# Patient Record
Sex: FEMALE | Race: WHITE | NOT HISPANIC OR LATINO | Employment: OTHER | URBAN - NONMETROPOLITAN AREA
[De-identification: names, ages, dates, MRNs, and addresses within clinical notes are randomized per-mention and may not be internally consistent; named-entity substitution may affect disease eponyms.]

---

## 2017-09-13 ENCOUNTER — TRANSCRIBE ORDERS (OUTPATIENT)
Dept: ADMINISTRATIVE | Facility: HOSPITAL | Age: 77
End: 2017-09-13

## 2017-09-13 DIAGNOSIS — Z12.31 VISIT FOR SCREENING MAMMOGRAM: Primary | ICD-10-CM

## 2017-09-29 ENCOUNTER — HOSPITAL ENCOUNTER (OUTPATIENT)
Dept: MAMMOGRAPHY | Facility: HOSPITAL | Age: 77
Discharge: HOME | End: 2017-09-29
Attending: INTERNAL MEDICINE | Admitting: INTERNAL MEDICINE

## 2017-09-29 DIAGNOSIS — Z12.31 VISIT FOR SCREENING MAMMOGRAM: ICD-10-CM

## 2017-09-29 PROCEDURE — G0202 SCR MAMMO BI INCL CAD: HCPCS | Performed by: RADIOLOGY

## 2017-09-29 PROCEDURE — 77063 BREAST TOMOSYNTHESIS BI: CPT

## 2017-09-29 PROCEDURE — G0202 SCR MAMMO BI INCL CAD: HCPCS

## 2017-09-29 PROCEDURE — 77063 BREAST TOMOSYNTHESIS BI: CPT | Performed by: RADIOLOGY

## 2018-04-10 ENCOUNTER — HOSPITAL ENCOUNTER (OUTPATIENT)
Facility: HOSPITAL | Age: 78
Setting detail: OBSERVATION
Discharge: HOME | End: 2018-04-12
Attending: EMERGENCY MEDICINE | Admitting: INTERNAL MEDICINE

## 2018-04-10 ENCOUNTER — APPOINTMENT (OUTPATIENT)
Dept: GENERAL RADIOLOGY | Facility: HOSPITAL | Age: 78
End: 2018-04-10

## 2018-04-10 DIAGNOSIS — R07.9 CHEST PAIN, UNSPECIFIED TYPE: Primary | ICD-10-CM

## 2018-04-10 LAB
ALBUMIN SERPL-MCNC: 4.1 G/DL (ref 3.4–4.8)
ALBUMIN/GLOB SERPL: 1.5 G/DL (ref 1.5–2.5)
ALP SERPL-CCNC: 115 U/L (ref 35–104)
ALT SERPL W P-5'-P-CCNC: 18 U/L (ref 10–36)
ANION GAP SERPL CALCULATED.3IONS-SCNC: 5.3 MMOL/L (ref 3.6–11.2)
AST SERPL-CCNC: 19 U/L (ref 10–30)
BASOPHILS # BLD AUTO: 0.01 10*3/MM3 (ref 0–0.3)
BASOPHILS NFR BLD AUTO: 0.2 % (ref 0–2)
BILIRUB SERPL-MCNC: 0.4 MG/DL (ref 0.2–1.8)
BNP SERPL-MCNC: 298 PG/ML (ref 0–100)
BUN BLD-MCNC: 24 MG/DL (ref 7–21)
BUN/CREAT SERPL: 17.1 (ref 7–25)
CALCIUM SPEC-SCNC: 10 MG/DL (ref 7.7–10)
CHLORIDE SERPL-SCNC: 104 MMOL/L (ref 99–112)
CHOLEST SERPL-MCNC: 123 MG/DL (ref 0–200)
CO2 SERPL-SCNC: 23.7 MMOL/L (ref 24.3–31.9)
CREAT BLD-MCNC: 1.4 MG/DL (ref 0.43–1.29)
DEPRECATED RDW RBC AUTO: 44.7 FL (ref 37–54)
EOSINOPHIL # BLD AUTO: 0.07 10*3/MM3 (ref 0–0.7)
EOSINOPHIL NFR BLD AUTO: 1.4 % (ref 0–7)
ERYTHROCYTE [DISTWIDTH] IN BLOOD BY AUTOMATED COUNT: 13.7 % (ref 11.5–14.5)
GFR SERPL CREATININE-BSD FRML MDRD: 36 ML/MIN/1.73
GLOBULIN UR ELPH-MCNC: 2.8 GM/DL
GLUCOSE BLD-MCNC: 229 MG/DL (ref 70–110)
GLUCOSE BLDC GLUCOMTR-MCNC: 136 MG/DL (ref 70–130)
GLUCOSE BLDC GLUCOMTR-MCNC: 165 MG/DL (ref 70–130)
GLUCOSE BLDC GLUCOMTR-MCNC: 67 MG/DL (ref 70–130)
HCT VFR BLD AUTO: 35 % (ref 37–47)
HDLC SERPL-MCNC: 52 MG/DL (ref 60–100)
HGB BLD-MCNC: 10.8 G/DL (ref 12–16)
HOLD SPECIMEN: NORMAL
HOLD SPECIMEN: NORMAL
IMM GRANULOCYTES # BLD: 0.01 10*3/MM3 (ref 0–0.03)
IMM GRANULOCYTES NFR BLD: 0.2 % (ref 0–0.5)
LDLC SERPL CALC-MCNC: 51 MG/DL (ref 0–100)
LDLC/HDLC SERPL: 0.98 {RATIO}
LIPASE SERPL-CCNC: 49 U/L (ref 13–60)
LYMPHOCYTES # BLD AUTO: 1.27 10*3/MM3 (ref 1–3)
LYMPHOCYTES NFR BLD AUTO: 26 % (ref 16–46)
MCH RBC QN AUTO: 27.3 PG (ref 27–33)
MCHC RBC AUTO-ENTMCNC: 30.9 G/DL (ref 33–37)
MCV RBC AUTO: 88.6 FL (ref 80–94)
MONOCYTES # BLD AUTO: 0.55 10*3/MM3 (ref 0.1–0.9)
MONOCYTES NFR BLD AUTO: 11.2 % (ref 0–12)
NEUTROPHILS # BLD AUTO: 2.98 10*3/MM3 (ref 1.4–6.5)
NEUTROPHILS NFR BLD AUTO: 61 % (ref 40–75)
OSMOLALITY SERPL CALC.SUM OF ELEC: 277.7 MOSM/KG (ref 273–305)
PLATELET # BLD AUTO: 180 10*3/MM3 (ref 130–400)
PMV BLD AUTO: 10.8 FL (ref 6–10)
POTASSIUM BLD-SCNC: 4.6 MMOL/L (ref 3.5–5.3)
PROT SERPL-MCNC: 6.9 G/DL (ref 6–8)
RBC # BLD AUTO: 3.95 10*6/MM3 (ref 4.2–5.4)
SODIUM BLD-SCNC: 133 MMOL/L (ref 135–153)
TRIGL SERPL-MCNC: 101 MG/DL (ref 0–150)
TROPONIN I SERPL-MCNC: 0.01 NG/ML
TROPONIN I SERPL-MCNC: 0.02 NG/ML
VLDLC SERPL-MCNC: 20.2 MG/DL
WBC NRBC COR # BLD: 4.89 10*3/MM3 (ref 4.5–12.5)
WHOLE BLOOD HOLD SPECIMEN: NORMAL
WHOLE BLOOD HOLD SPECIMEN: NORMAL

## 2018-04-10 PROCEDURE — 96372 THER/PROPH/DIAG INJ SC/IM: CPT

## 2018-04-10 PROCEDURE — 83880 ASSAY OF NATRIURETIC PEPTIDE: CPT | Performed by: EMERGENCY MEDICINE

## 2018-04-10 PROCEDURE — 83690 ASSAY OF LIPASE: CPT | Performed by: EMERGENCY MEDICINE

## 2018-04-10 PROCEDURE — 93005 ELECTROCARDIOGRAM TRACING: CPT | Performed by: INTERNAL MEDICINE

## 2018-04-10 PROCEDURE — G0378 HOSPITAL OBSERVATION PER HR: HCPCS

## 2018-04-10 PROCEDURE — 99284 EMERGENCY DEPT VISIT MOD MDM: CPT

## 2018-04-10 PROCEDURE — 80053 COMPREHEN METABOLIC PANEL: CPT | Performed by: EMERGENCY MEDICINE

## 2018-04-10 PROCEDURE — 63710000001 INSULIN ASPART PER 5 UNITS: Performed by: INTERNAL MEDICINE

## 2018-04-10 PROCEDURE — 84484 ASSAY OF TROPONIN QUANT: CPT | Performed by: EMERGENCY MEDICINE

## 2018-04-10 PROCEDURE — 71045 X-RAY EXAM CHEST 1 VIEW: CPT

## 2018-04-10 PROCEDURE — 93010 ELECTROCARDIOGRAM REPORT: CPT | Performed by: INTERNAL MEDICINE

## 2018-04-10 PROCEDURE — 84484 ASSAY OF TROPONIN QUANT: CPT | Performed by: INTERNAL MEDICINE

## 2018-04-10 PROCEDURE — 93005 ELECTROCARDIOGRAM TRACING: CPT | Performed by: EMERGENCY MEDICINE

## 2018-04-10 PROCEDURE — 25010000002 HEPARIN (PORCINE) PER 1000 UNITS: Performed by: INTERNAL MEDICINE

## 2018-04-10 PROCEDURE — 94799 UNLISTED PULMONARY SVC/PX: CPT

## 2018-04-10 PROCEDURE — 80061 LIPID PANEL: CPT | Performed by: INTERNAL MEDICINE

## 2018-04-10 PROCEDURE — 82962 GLUCOSE BLOOD TEST: CPT

## 2018-04-10 PROCEDURE — 36415 COLL VENOUS BLD VENIPUNCTURE: CPT

## 2018-04-10 PROCEDURE — 85025 COMPLETE CBC W/AUTO DIFF WBC: CPT | Performed by: EMERGENCY MEDICINE

## 2018-04-10 PROCEDURE — 71045 X-RAY EXAM CHEST 1 VIEW: CPT | Performed by: RADIOLOGY

## 2018-04-10 PROCEDURE — 99220 PR INITIAL OBSERVATION CARE/DAY 70 MINUTES: CPT | Performed by: INTERNAL MEDICINE

## 2018-04-10 RX ORDER — DILTIAZEM HYDROCHLORIDE 300 MG/1
300 CAPSULE, COATED, EXTENDED RELEASE ORAL DAILY
COMMUNITY
End: 2018-04-12 | Stop reason: HOSPADM

## 2018-04-10 RX ORDER — MAGNESIUM SULFATE HEPTAHYDRATE 40 MG/ML
2 INJECTION, SOLUTION INTRAVENOUS AS NEEDED
Status: DISCONTINUED | OUTPATIENT
Start: 2018-04-10 | End: 2018-04-12 | Stop reason: HOSPADM

## 2018-04-10 RX ORDER — SIMVASTATIN 40 MG
20 TABLET ORAL NIGHTLY
COMMUNITY
End: 2018-04-12 | Stop reason: HOSPADM

## 2018-04-10 RX ORDER — LOSARTAN POTASSIUM 50 MG/1
100 TABLET ORAL DAILY
Status: CANCELLED | OUTPATIENT
Start: 2018-04-11

## 2018-04-10 RX ORDER — METOPROLOL SUCCINATE 25 MG/1
12.5 TABLET, EXTENDED RELEASE ORAL DAILY
Status: DISCONTINUED | OUTPATIENT
Start: 2018-04-11 | End: 2018-04-10

## 2018-04-10 RX ORDER — DEXTROSE MONOHYDRATE 25 G/50ML
25 INJECTION, SOLUTION INTRAVENOUS
Status: DISCONTINUED | OUTPATIENT
Start: 2018-04-10 | End: 2018-04-12 | Stop reason: HOSPADM

## 2018-04-10 RX ORDER — ATORVASTATIN CALCIUM 10 MG/1
10 TABLET, FILM COATED ORAL NIGHTLY
Status: DISCONTINUED | OUTPATIENT
Start: 2018-04-10 | End: 2018-04-12 | Stop reason: HOSPADM

## 2018-04-10 RX ORDER — GLYBURIDE 5 MG/1
10 TABLET ORAL
COMMUNITY
End: 2021-03-24

## 2018-04-10 RX ORDER — MAGNESIUM SULFATE HEPTAHYDRATE 40 MG/ML
4 INJECTION, SOLUTION INTRAVENOUS AS NEEDED
Status: DISCONTINUED | OUTPATIENT
Start: 2018-04-10 | End: 2018-04-12 | Stop reason: HOSPADM

## 2018-04-10 RX ORDER — ASPIRIN 81 MG/1
81 TABLET, CHEWABLE ORAL DAILY
COMMUNITY

## 2018-04-10 RX ORDER — LOSARTAN POTASSIUM 100 MG/1
100 TABLET ORAL DAILY
COMMUNITY
End: 2020-07-28 | Stop reason: HOSPADM

## 2018-04-10 RX ORDER — ASPIRIN 81 MG/1
81 TABLET ORAL DAILY
Status: DISCONTINUED | OUTPATIENT
Start: 2018-04-10 | End: 2018-04-12 | Stop reason: HOSPADM

## 2018-04-10 RX ORDER — OXYBUTYNIN CHLORIDE 15 MG/1
15 TABLET, EXTENDED RELEASE ORAL DAILY
Status: ON HOLD | COMMUNITY
End: 2018-04-10

## 2018-04-10 RX ORDER — HEPARIN SODIUM 5000 [USP'U]/ML
5000 INJECTION, SOLUTION INTRAVENOUS; SUBCUTANEOUS EVERY 12 HOURS SCHEDULED
Status: DISCONTINUED | OUTPATIENT
Start: 2018-04-10 | End: 2018-04-12 | Stop reason: HOSPADM

## 2018-04-10 RX ORDER — PIOGLITAZONEHYDROCHLORIDE 30 MG/1
30 TABLET ORAL DAILY
Status: ON HOLD | COMMUNITY
End: 2020-06-22 | Stop reason: DRUGHIGH

## 2018-04-10 RX ORDER — ASPIRIN 81 MG/1
81 TABLET, CHEWABLE ORAL DAILY
Status: CANCELLED | OUTPATIENT
Start: 2018-04-10

## 2018-04-10 RX ORDER — SODIUM CHLORIDE 0.9 % (FLUSH) 0.9 %
1-10 SYRINGE (ML) INJECTION AS NEEDED
Status: DISCONTINUED | OUTPATIENT
Start: 2018-04-10 | End: 2018-04-12 | Stop reason: HOSPADM

## 2018-04-10 RX ORDER — POTASSIUM CHLORIDE 7.45 MG/ML
10 INJECTION INTRAVENOUS
Status: DISCONTINUED | OUTPATIENT
Start: 2018-04-10 | End: 2018-04-12 | Stop reason: HOSPADM

## 2018-04-10 RX ORDER — POTASSIUM CHLORIDE 1.5 G/1.77G
40 POWDER, FOR SOLUTION ORAL AS NEEDED
Status: DISCONTINUED | OUTPATIENT
Start: 2018-04-10 | End: 2018-04-12 | Stop reason: HOSPADM

## 2018-04-10 RX ORDER — GLIPIZIDE 5 MG/1
5 TABLET ORAL
Status: CANCELLED | OUTPATIENT
Start: 2018-04-11

## 2018-04-10 RX ORDER — POTASSIUM CHLORIDE 750 MG/1
40 CAPSULE, EXTENDED RELEASE ORAL AS NEEDED
Status: DISCONTINUED | OUTPATIENT
Start: 2018-04-10 | End: 2018-04-12 | Stop reason: HOSPADM

## 2018-04-10 RX ORDER — SODIUM CHLORIDE 0.9 % (FLUSH) 0.9 %
10 SYRINGE (ML) INJECTION AS NEEDED
Status: DISCONTINUED | OUTPATIENT
Start: 2018-04-10 | End: 2018-04-12 | Stop reason: HOSPADM

## 2018-04-10 RX ORDER — METOPROLOL SUCCINATE 25 MG/1
12.5 TABLET, EXTENDED RELEASE ORAL EVERY MORNING
COMMUNITY
End: 2018-04-12 | Stop reason: HOSPADM

## 2018-04-10 RX ORDER — LOSARTAN POTASSIUM 50 MG/1
100 TABLET ORAL DAILY
Status: ON HOLD | COMMUNITY
End: 2018-04-10

## 2018-04-10 RX ORDER — NICOTINE POLACRILEX 4 MG
15 LOZENGE BUCCAL
Status: DISCONTINUED | OUTPATIENT
Start: 2018-04-10 | End: 2018-04-12 | Stop reason: HOSPADM

## 2018-04-10 RX ORDER — PIOGLITAZONEHYDROCHLORIDE 15 MG/1
30 TABLET ORAL DAILY
Status: CANCELLED | OUTPATIENT
Start: 2018-04-11

## 2018-04-10 RX ORDER — TERAZOSIN 1 MG/1
1 CAPSULE ORAL NIGHTLY
Status: DISCONTINUED | OUTPATIENT
Start: 2018-04-10 | End: 2018-04-12 | Stop reason: HOSPADM

## 2018-04-10 RX ORDER — POLYETHYLENE GLYCOL 3350 17 G/17G
17 POWDER, FOR SOLUTION ORAL DAILY
Status: ON HOLD | COMMUNITY
End: 2018-04-10

## 2018-04-10 RX ORDER — SIMVASTATIN 40 MG
40 TABLET ORAL NIGHTLY
Status: ON HOLD | COMMUNITY
End: 2018-04-10

## 2018-04-10 RX ORDER — DOXAZOSIN MESYLATE 1 MG/1
1 TABLET ORAL NIGHTLY
Status: ON HOLD | COMMUNITY
End: 2020-06-22

## 2018-04-10 RX ORDER — DILTIAZEM HYDROCHLORIDE 300 MG/1
300 CAPSULE, COATED, EXTENDED RELEASE ORAL DAILY
Status: DISCONTINUED | OUTPATIENT
Start: 2018-04-10 | End: 2018-04-10

## 2018-04-10 RX ADMIN — INSULIN ASPART 2 UNITS: 100 INJECTION, SOLUTION INTRAVENOUS; SUBCUTANEOUS at 20:51

## 2018-04-10 RX ADMIN — ASPIRIN 81 MG: 81 TABLET ORAL at 17:23

## 2018-04-10 RX ADMIN — TERAZOSIN HYDROCHLORIDE 1 MG: 1 CAPSULE ORAL at 20:50

## 2018-04-10 RX ADMIN — ATORVASTATIN CALCIUM 10 MG: 10 TABLET, FILM COATED ORAL at 20:50

## 2018-04-10 RX ADMIN — HEPARIN SODIUM 5000 UNITS: 5000 INJECTION, SOLUTION INTRAVENOUS; SUBCUTANEOUS at 20:50

## 2018-04-10 NOTE — H&P
[facility]    Patient Identification:  Name:  Rachana Justin  Age:  77 y.o.  Sex:  female  :  1940  MRN:  [id number]   Visit Number:  [id number]  Primary Care Physician:  Shabnam De La Rosa MD    Chief complaint: chest pressure    History of presenting illness:  77 y.o. female with history of diabetes, atrial fibrillation, hypertension, and hyperlipidemia who presented to the ER with chest pressure.  It started up a couple of days ago and felt like a tight band around her chest with some pressure up under her breast bone.  She woke up this morning with the same chest pressure that was greatly intensified.  She couldn't get comfortable.  It was associated with shortness of breath.  It didn't radiate.  She has had some chronic lower extremity edema that have been worse recently.  She has had no orthopnea or PND.  She has never had a heart attack before.  She says she had a stress test many years ago that was normal.    Also in the ER she was noted to be significantly bradycardic and atrial fibrillation.  She is on Cardizem, metoprolol, and Cardura.  She says the Cardizem was started about 2 weeks ago.  In the Cardura as well.  She is felt somewhat listless since starting the medications.  She denies any presyncope or dizziness.  ---------------------------------------------------------------------------------------------------------------------   Review of Systems   Constitutional: Positive for fatigue. Negative for chills, diaphoresis and fever.   HENT: Negative for ear pain, rhinorrhea, sinus pressure and sore throat.    Eyes: Negative for pain, redness and visual disturbance.   Respiratory: Positive for chest tightness and shortness of breath. Negative for cough and wheezing.    Cardiovascular: Positive for chest pain and leg swelling. Negative for palpitations.   Gastrointestinal: Positive for constipation. Negative for abdominal pain, blood in stool,  diarrhea, nausea and vomiting.   Genitourinary: Negative for dysuria, frequency and hematuria.   Musculoskeletal: Positive for arthralgias. Negative for joint swelling and myalgias.   Skin: Negative for rash and wound.   Neurological: Negative for dizziness, syncope, weakness, light-headedness, numbness and headaches.   Hematological: Negative for adenopathy. Does not bruise/bleed easily.   Psychiatric/Behavioral: Negative for confusion and hallucinations.      ---------------------------------------------------------------------------------------------------------------------   Past Medical History:   Diagnosis Date   • Atrial fibrillation    • Diabetes mellitus    • Hyperlipidemia    • Hypertension      Past Surgical History:   Procedure Laterality Date   • CATARACT EXTRACTION Bilateral    • FRACTURE SURGERY       Family History   Problem Relation Age of Onset   • Breast cancer Mother    • Breast cancer Sister    • Breast cancer Sister      Social History     Social History   • Marital status:      Social History Main Topics   • Smoking status: Never Smoker   • Alcohol use No   • Drug use: No   • Sexual activity: No     Other Topics Concern   • Not on file     ---------------------------------------------------------------------------------------------------------------------   Allergies:  Review of patient's allergies indicates no known allergies.  ---------------------------------------------------------------------------------------------------------------------   Prior to Admission Medications     Prescriptions Last Dose Informant Patient Reported? Taking?    aspirin 81 MG chewable tablet   Yes Yes    Chew 81 mg Daily.    diltiaZEM CD (CARDIZEM CD) 300 MG 24 hr capsule   Yes Yes    Take 300 mg by mouth Daily.    doxazosin (CARDURA) 1 MG tablet   Yes Yes    Take 1 mg by mouth Every Night.    glyBURIDE (DIAbeta) 5 MG tablet   Yes Yes    Take 10 mg by mouth 2 (Two) Times a Day With Meals.    losartan  (COZAAR) 50 MG tablet   Yes Yes    Take 100 mg by mouth Daily.    metoprolol tartrate (LOPRESSOR) 25 MG tablet   Yes Yes    Take 12.5 mg by mouth Daily.    oxybutynin XL (DITROPAN XL) 15 MG 24 hr tablet   Yes Yes    Take 15 mg by mouth Daily.    pioglitazone (ACTOS) 30 MG tablet   Yes Yes    Take 30 mg by mouth Daily.    polyethylene glycol (MIRALAX) packet   Yes Yes    Take 17 g by mouth Daily.    simvastatin (ZOCOR) 40 MG tablet   Yes Yes    Take 40 mg by mouth Every Night.    SITagliptin-MetFORMIN HCl ER (JANUMET XR)  MG tablet   Yes Yes    Take 1 tablet by mouth Daily.        Hospital Scheduled Meds:    aspirin 81 mg Oral Daily   heparin (porcine) 5,000 Units Subcutaneous Q12H   insulin aspart 0-9 Units Subcutaneous 4x Daily AC & at Bedtime        ---------------------------------------------------------------------------------------------------------------------   Vital Signs:  Temp:  [97.5 °F (36.4 °C)-97.8 °F (36.6 °C)] 97.5 °F (36.4 °C)  Heart Rate:  [37-50] 45  Resp:  [18-20] 20  BP: (135-175)/() 175/73  1    04/10/18  1253   Weight: 97.5 kg (215 lb)     Body mass index is 31.75 kg/m².  ---------------------------------------------------------------------------------------------------------------------   Physical Exam:  Constitutional:  Well-developed and well-nourished Elderly white female.  No respiratory distress.      HENT:  Head: Normocephalic and atraumatic.  Mouth:  Moist mucous membranes.    Eyes:  Conjunctivae and EOM are normal.  Pupils are equal, round, and reactive to light.  No scleral icterus.  Neck:  Neck supple.  No JVD present.    Cardiovascular:  Normal rate, regular rhythm and normal heart sounds with no murmur.  Pulmonary/Chest:  No respiratory distress, no wheezes, no crackles, with normal breath sounds and good air movement.  Abdominal:  Soft.  Bowel sounds are normal.  No distension and no tenderness.   Musculoskeletal:  Trace edema, no tenderness, and no deformity.  No  red or swollen joints anywhere.    Neurological:  Alert and oriented to person, place, and time.  Moves all extremities equally.  No cranial nerve deficit.  No tongue deviation.  No facial droop.  No slurred speech.   Skin:  Skin is warm and dry.  No rash noted.  No pallor.   Psychiatric:  Normal mood and affect.  Behavior is normal.  Judgment and thought content normal.   Peripheral vascular:  Trace edema and strong pulses on all 4 extremities.  Genitourinary:  ---------------------------------------------------------------------------------------------------------------------  EKG:  Atrial fibrillation with ventricular response in the low 40s with no specific ST-T wave changes are normal QT  Telemetry:  Atrial fibrillation in the 40s  I have personally looked at both the EKG and the telemetry strips.  ---------------------------------------------------------------------------------------------------------------------     Results from last 7 days  Lab Units 04/10/18  1338   WBC 10*3/mm3 4.89   HEMOGLOBIN g/dL 10.8*   HEMATOCRIT % 35.0*   MCV fL 88.6   MCHC g/dL 30.9*   PLATELETS 10*3/mm3 180           Results from last 7 days  Lab Units 04/10/18  1338   SODIUM mmol/L 133*   POTASSIUM mmol/L 4.6   CHLORIDE mmol/L 104   CO2 mmol/L 23.7*   BUN mg/dL 24*   CREATININE mg/dL 1.40*   EGFR IF NONAFRICN AM mL/min/1.73 36*   CALCIUM mg/dL 10.0   GLUCOSE mg/dL 229*   ALBUMIN g/dL 4.10   BILIRUBIN mg/dL 0.4   ALK PHOS U/L 115*   AST (SGOT) U/L 19   ALT (SGPT) U/L 18   Estimated Creatinine Clearance: 40.5 mL/min (by C-G formula based on SCr of 1.4 mg/dL (H)).  No results found for: AMMONIA    Results from last 7 days  Lab Units 04/10/18  1338   TROPONIN I ng/mL 0.017         No results found for: HGBA1C  No results found for: TSH, FREET4  No results found for: PREGTESTUR, PREGSERUM, HCG, HCGQUANT  Pain Management Panel     There is no flowsheet data to display.                         ---------------------------------------------------------------------------------------------------------------------  Imaging Results (last 7 days)     Procedure Component Value Units Date/Time    XR Chest 1 View [[id number]] Collected:  04/10/18 1443     Updated:  04/10/18 1445    Narrative:       EXAMINATION: XR CHEST 1 VW-      CLINICAL INDICATION:     Chest pain protocol     TECHNIQUE:  XR CHEST 1 VW-      COMPARISON: NONE      FINDINGS:   Coarse interstitial markings suggestive of chronic interstitial lung  disease.  Heart and mediastinum contours are unremarkable.  No pleural effusion.  No pneumothorax.   Bony and soft tissue structures are unremarkable.       Impression:       No radiographic evidence of acute cardiac or pulmonary  disease.     This report was finalized on 4/10/2018 2:43 PM by Dr. Trey Salgado MD.             I have personally reviewed the radiology images and read the final radiology report.  ---------------------------------------------------------------------------------------------------------------------  Assessment and Plan:  Chest pain, concerning for cardiac source  Atrial fibrillation with slow ventricular response  Hypertension  Hyperlipidemia  Observation on telemetry with serial cardiac enzymes  Start aspirin and continue her statin  We'll hold the Cardizem and metoprolol for now with the significant bradycardia.  If she needs blood pressure coverage would use an ACE inhibitor or amlodipine  Echocardiogram  Lipid panel  Stress test in the morning with Lexiscan    Diabetes mellitus type 2  Hold the glyburide and metformin and Actos as well  Fingerstick glucoses with sliding scale insulin coverage    DVT prophylaxis  Subcutaneous heparin    Alonso Wadsworth MD  04/10/18  4:53 PM

## 2018-04-10 NOTE — ED PROVIDER NOTES
Subjective   77-year-old female presents emergency Department with complaints of chest pressure. It started last night and was constant until this AM. Intermittent this AM. Resolved prior to arrival in the ED. Patient says that she had an appointment with primary care physician today so she waited to see a physician. She was told to go to the ED by her PCP. Patient denies chest pain or shortness of breath in the ED. Patient says that her pressure was in the center of her chest. No radiation. No alleviating or exacerbating factors. Hx of atrial fibrillation, htn, hyperlipidemia, and dm. Patient denies hx of MI. No stents/CABG. Does not have cardiologist. Has been following up with PCP for care.         History provided by:  Patient   used: No    Chest Pain   Pain location:  Substernal area  Pain quality: pressure    Pain radiates to:  Does not radiate  Pain severity:  Mild  Onset quality:  Gradual  Duration:  1 day  Timing:  Constant  Progression:  Resolved  Chronicity:  New  Context: at rest    Relieved by:  Nothing  Worsened by:  Nothing  Ineffective treatments:  None tried  Associated symptoms: no abdominal pain, no back pain, no cough, no diaphoresis, no dizziness, no dysphagia, no fever, no heartburn, no lower extremity edema, no nausea, no numbness, no palpitations, no shortness of breath, no syncope, no vomiting and no weakness    Risk factors: diabetes mellitus, high cholesterol and hypertension        Review of Systems   Constitutional: Negative for chills, diaphoresis and fever.   HENT: Negative for congestion, rhinorrhea, sore throat and trouble swallowing.    Eyes: Negative for discharge and visual disturbance.   Respiratory: Negative for cough, chest tightness, shortness of breath and wheezing.    Cardiovascular: Positive for chest pain. Negative for palpitations, leg swelling and syncope.   Gastrointestinal: Negative for abdominal pain, constipation, diarrhea, heartburn, nausea and  vomiting.   Genitourinary: Negative for dysuria, flank pain and hematuria.   Musculoskeletal: Negative for back pain, myalgias and neck pain.   Skin: Negative for color change and rash.   Neurological: Negative for dizziness, weakness and numbness.   Psychiatric/Behavioral: Negative for self-injury and suicidal ideas.       Past Medical History:   Diagnosis Date   • Atrial fibrillation    • Diabetes mellitus    • Hyperlipidemia    • Hypertension        No Known Allergies    Past Surgical History:   Procedure Laterality Date   • CATARACT EXTRACTION Bilateral    • FRACTURE SURGERY         Family History   Problem Relation Age of Onset   • Breast cancer Mother    • Breast cancer Sister    • Breast cancer Sister        Social History     Social History   • Marital status:      Social History Main Topics   • Smoking status: Never Smoker   • Alcohol use No   • Drug use: No   • Sexual activity: No     Other Topics Concern   • Not on file           Objective   Physical Exam   Constitutional: She is oriented to person, place, and time. She appears well-developed and well-nourished.   HENT:   Head: Normocephalic and atraumatic.   Nose: Nose normal.   Mouth/Throat: Oropharynx is clear and moist.   Eyes: Conjunctivae and EOM are normal. Pupils are equal, round, and reactive to light.   Neck: Normal range of motion. Neck supple.   Cardiovascular: Normal rate, regular rhythm, normal heart sounds and intact distal pulses.    Pulmonary/Chest: Effort normal and breath sounds normal. No respiratory distress. She has no wheezes. She exhibits no tenderness.   Abdominal: Soft. Bowel sounds are normal. There is no tenderness. There is no rebound and no guarding.   Musculoskeletal: Normal range of motion. She exhibits no edema, tenderness or deformity.   Neurological: She is alert and oriented to person, place, and time. No cranial nerve deficit. Coordination normal.   Skin: Skin is warm and dry. No rash noted. No erythema. No  pallor.   Psychiatric: She has a normal mood and affect. Her behavior is normal. Judgment and thought content normal.   Nursing note and vitals reviewed.      Procedures         ED Course  ED Course                  MDM  Number of Diagnoses or Management Options  Chest pain, unspecified type:   Diagnosis management comments: EKG: Ventricular rate 49, AL interval 0, QRS duration 82, , atrial fibrillation.  No ischemic changes.     Labs and imaging obtained. No acute process. Patient continues to be asymptomatic in the ED. I will admit the patient to Dr. Wadsworth for cardiac rule out. Patient currently stable.     Patient Progress  Patient progress: stable      Final diagnoses:   Chest pain, unspecified type            Malissa Belle MD  04/10/18 2539

## 2018-04-10 NOTE — PLAN OF CARE
Problem: Patient Care Overview  Goal: Plan of Care Review  Outcome: Ongoing (interventions implemented as appropriate)    Goal: Individualization and Mutuality  Outcome: Ongoing (interventions implemented as appropriate)    Goal: Discharge Needs Assessment  Outcome: Ongoing (interventions implemented as appropriate)    Goal: Interprofessional Rounds/Family Conf  Outcome: Ongoing (interventions implemented as appropriate)      Problem: Fall Risk (Adult)  Goal: Identify Related Risk Factors and Signs and Symptoms  Outcome: Ongoing (interventions implemented as appropriate)    Goal: Absence of Fall  Outcome: Ongoing (interventions implemented as appropriate)      Problem: Cardiac Output Decreased (Adult)  Goal: Identify Related Risk Factors and Signs and Symptoms  Outcome: Ongoing (interventions implemented as appropriate)    Goal: Effective Tissue Perfusion  Outcome: Ongoing (interventions implemented as appropriate)      Problem: Diabetes, Type 2 (Adult)  Goal: Signs and Symptoms of Listed Potential Problems Will be Absent, Minimized or Managed (Diabetes, Type 2)  Outcome: Ongoing (interventions implemented as appropriate)

## 2018-04-11 ENCOUNTER — APPOINTMENT (OUTPATIENT)
Dept: NUCLEAR MEDICINE | Facility: HOSPITAL | Age: 78
End: 2018-04-11

## 2018-04-11 ENCOUNTER — APPOINTMENT (OUTPATIENT)
Dept: CARDIOLOGY | Facility: HOSPITAL | Age: 78
End: 2018-04-11
Attending: INTERNAL MEDICINE

## 2018-04-11 ENCOUNTER — APPOINTMENT (OUTPATIENT)
Dept: CARDIOLOGY | Facility: HOSPITAL | Age: 78
End: 2018-04-11

## 2018-04-11 LAB
ANION GAP SERPL CALCULATED.3IONS-SCNC: 5.2 MMOL/L (ref 3.6–11.2)
BASOPHILS # BLD AUTO: 0.01 10*3/MM3 (ref 0–0.3)
BASOPHILS NFR BLD AUTO: 0.2 % (ref 0–2)
BH CV ECHO MEAS - % IVS THICK: 61.6 %
BH CV ECHO MEAS - % LVPW THICK: 38.2 %
BH CV ECHO MEAS - ACS: 2 CM
BH CV ECHO MEAS - AO ROOT AREA (BSA CORRECTED): 1.5
BH CV ECHO MEAS - AO ROOT AREA: 8.1 CM^2
BH CV ECHO MEAS - AO ROOT DIAM: 3.2 CM
BH CV ECHO MEAS - BSA(HAYCOCK): 2.2 M^2
BH CV ECHO MEAS - BSA: 2.1 M^2
BH CV ECHO MEAS - BZI_BMI: 31.8 KILOGRAMS/M^2
BH CV ECHO MEAS - BZI_METRIC_HEIGHT: 175.3 CM
BH CV ECHO MEAS - BZI_METRIC_WEIGHT: 97.5 KG
BH CV ECHO MEAS - CONTRAST EF 4CH: 70 ML/M^2
BH CV ECHO MEAS - EDV(CUBED): 172.1 ML
BH CV ECHO MEAS - EDV(MOD-SP4): 70 ML
BH CV ECHO MEAS - EDV(TEICH): 151.3 ML
BH CV ECHO MEAS - EF(CUBED): 80.1 %
BH CV ECHO MEAS - EF(TEICH): 71.9 %
BH CV ECHO MEAS - ESV(CUBED): 34.3 ML
BH CV ECHO MEAS - ESV(MOD-SP4): 21 ML
BH CV ECHO MEAS - ESV(TEICH): 42.5 ML
BH CV ECHO MEAS - FS: 41.6 %
BH CV ECHO MEAS - IVS/LVPW: 1.1
BH CV ECHO MEAS - IVSD: 1.3 CM
BH CV ECHO MEAS - IVSS: 2.2 CM
BH CV ECHO MEAS - LA DIMENSION: 4.3 CM
BH CV ECHO MEAS - LA/AO: 1.3
BH CV ECHO MEAS - LV DIASTOLIC VOL/BSA (35-75): 32.9 ML/M^2
BH CV ECHO MEAS - LV MASS(C)D: 309.9 GRAMS
BH CV ECHO MEAS - LV MASS(C)DI: 145.5 GRAMS/M^2
BH CV ECHO MEAS - LV MASS(C)S: 277.1 GRAMS
BH CV ECHO MEAS - LV MASS(C)SI: 130.1 GRAMS/M^2
BH CV ECHO MEAS - LV SYSTOLIC VOL/BSA (12-30): 9.9 ML/M^2
BH CV ECHO MEAS - LVIDD: 5.6 CM
BH CV ECHO MEAS - LVIDS: 3.2 CM
BH CV ECHO MEAS - LVLD AP4: 7.5 CM
BH CV ECHO MEAS - LVLS AP4: 6.8 CM
BH CV ECHO MEAS - LVOT AREA (M): 2.8 CM^2
BH CV ECHO MEAS - LVOT AREA: 2.9 CM^2
BH CV ECHO MEAS - LVOT DIAM: 1.9 CM
BH CV ECHO MEAS - LVPWD: 1.3 CM
BH CV ECHO MEAS - LVPWS: 1.7 CM
BH CV ECHO MEAS - MV A MAX VEL: 96.5 CM/SEC
BH CV ECHO MEAS - MV E MAX VEL: 116.8 CM/SEC
BH CV ECHO MEAS - MV E/A: 1.2
BH CV ECHO MEAS - PA ACC SLOPE: 1480 CM/SEC^2
BH CV ECHO MEAS - PA ACC TIME: 0.09 SEC
BH CV ECHO MEAS - PA PR(ACCEL): 39.4 MMHG
BH CV ECHO MEAS - RAP SYSTOLE: 10 MMHG
BH CV ECHO MEAS - RVDD: 2.3 CM
BH CV ECHO MEAS - RVSP: 46.6 MMHG
BH CV ECHO MEAS - SI(CUBED): 64.7 ML/M^2
BH CV ECHO MEAS - SI(MOD-SP4): 23 ML/M^2
BH CV ECHO MEAS - SI(TEICH): 51.1 ML/M^2
BH CV ECHO MEAS - SV(CUBED): 137.8 ML
BH CV ECHO MEAS - SV(MOD-SP4): 49 ML
BH CV ECHO MEAS - SV(TEICH): 108.8 ML
BH CV ECHO MEAS - TR MAX VEL: 302.7 CM/SEC
BH CV NUCLEAR PRIOR STUDY: 3
BH CV STRESS BP STAGE 1: NORMAL
BH CV STRESS BP STAGE 2: NORMAL
BH CV STRESS COMMENTS STAGE 1: NORMAL
BH CV STRESS COMMENTS STAGE 2: NORMAL
BH CV STRESS DOSE REGADENOSON STAGE 1: 0.4
BH CV STRESS DURATION MIN STAGE 1: 0
BH CV STRESS DURATION MIN STAGE 2: 4
BH CV STRESS DURATION SEC STAGE 1: 10
BH CV STRESS DURATION SEC STAGE 2: 0
BH CV STRESS HR STAGE 1: 58
BH CV STRESS HR STAGE 2: 79
BH CV STRESS PROTOCOL 1: NORMAL
BH CV STRESS RECOVERY BP: NORMAL MMHG
BH CV STRESS RECOVERY HR: 52 BPM
BH CV STRESS STAGE 1: 1
BH CV STRESS STAGE 2: 2
BUN BLD-MCNC: 22 MG/DL (ref 7–21)
BUN/CREAT SERPL: 17.7 (ref 7–25)
CALCIUM SPEC-SCNC: 9.4 MG/DL (ref 7.7–10)
CHLORIDE SERPL-SCNC: 106 MMOL/L (ref 99–112)
CO2 SERPL-SCNC: 23.8 MMOL/L (ref 24.3–31.9)
CREAT BLD-MCNC: 1.24 MG/DL (ref 0.43–1.29)
DEPRECATED RDW RBC AUTO: 43.7 FL (ref 37–54)
EOSINOPHIL # BLD AUTO: 0.11 10*3/MM3 (ref 0–0.7)
EOSINOPHIL NFR BLD AUTO: 2 % (ref 0–7)
ERYTHROCYTE [DISTWIDTH] IN BLOOD BY AUTOMATED COUNT: 13.5 % (ref 11.5–14.5)
GFR SERPL CREATININE-BSD FRML MDRD: 42 ML/MIN/1.73
GLUCOSE BLD-MCNC: 168 MG/DL (ref 70–110)
GLUCOSE BLDC GLUCOMTR-MCNC: 135 MG/DL (ref 70–130)
GLUCOSE BLDC GLUCOMTR-MCNC: 151 MG/DL (ref 70–130)
GLUCOSE BLDC GLUCOMTR-MCNC: 151 MG/DL (ref 70–130)
GLUCOSE BLDC GLUCOMTR-MCNC: 275 MG/DL (ref 70–130)
GLUCOSE BLDC GLUCOMTR-MCNC: 286 MG/DL (ref 70–130)
HCT VFR BLD AUTO: 31 % (ref 37–47)
HGB BLD-MCNC: 9.6 G/DL (ref 12–16)
IMM GRANULOCYTES # BLD: 0 10*3/MM3 (ref 0–0.03)
IMM GRANULOCYTES NFR BLD: 0 % (ref 0–0.5)
LYMPHOCYTES # BLD AUTO: 1.06 10*3/MM3 (ref 1–3)
LYMPHOCYTES NFR BLD AUTO: 19.4 % (ref 16–46)
MAXIMAL PREDICTED HEART RATE: 141 BPM
MAXIMAL PREDICTED HEART RATE: 141 BPM
MCH RBC QN AUTO: 28.3 PG (ref 27–33)
MCHC RBC AUTO-ENTMCNC: 31 G/DL (ref 33–37)
MCV RBC AUTO: 91.4 FL (ref 80–94)
MONOCYTES # BLD AUTO: 0.72 10*3/MM3 (ref 0.1–0.9)
MONOCYTES NFR BLD AUTO: 13.2 % (ref 0–12)
NEUTROPHILS # BLD AUTO: 3.55 10*3/MM3 (ref 1.4–6.5)
NEUTROPHILS NFR BLD AUTO: 65.2 % (ref 40–75)
OSMOLALITY SERPL CALC.SUM OF ELEC: 277.3 MOSM/KG (ref 273–305)
PERCENT MAX PREDICTED HR: 60.28 %
PLATELET # BLD AUTO: 172 10*3/MM3 (ref 130–400)
PMV BLD AUTO: 10.9 FL (ref 6–10)
POTASSIUM BLD-SCNC: 4.4 MMOL/L (ref 3.5–5.3)
RBC # BLD AUTO: 3.39 10*6/MM3 (ref 4.2–5.4)
SODIUM BLD-SCNC: 135 MMOL/L (ref 135–153)
STRESS BASELINE BP: NORMAL MMHG
STRESS BASELINE HR: 61 BPM
STRESS PERCENT HR: 71 %
STRESS POST PEAK BP: NORMAL MMHG
STRESS POST PEAK HR: 85 BPM
STRESS TARGET HR: 120 BPM
STRESS TARGET HR: 120 BPM
TROPONIN I SERPL-MCNC: 0.02 NG/ML
WBC NRBC COR # BLD: 5.45 10*3/MM3 (ref 4.5–12.5)

## 2018-04-11 PROCEDURE — 93306 TTE W/DOPPLER COMPLETE: CPT

## 2018-04-11 PROCEDURE — 0 TECHNETIUM SESTAMIBI: Performed by: INTERNAL MEDICINE

## 2018-04-11 PROCEDURE — 99226 PR SBSQ OBSERVATION CARE/DAY 35 MINUTES: CPT | Performed by: INTERNAL MEDICINE

## 2018-04-11 PROCEDURE — 25010000002 HEPARIN (PORCINE) PER 1000 UNITS: Performed by: INTERNAL MEDICINE

## 2018-04-11 PROCEDURE — 84484 ASSAY OF TROPONIN QUANT: CPT | Performed by: INTERNAL MEDICINE

## 2018-04-11 PROCEDURE — G0378 HOSPITAL OBSERVATION PER HR: HCPCS

## 2018-04-11 PROCEDURE — 63710000001 INSULIN ASPART PER 5 UNITS: Performed by: INTERNAL MEDICINE

## 2018-04-11 PROCEDURE — 82962 GLUCOSE BLOOD TEST: CPT

## 2018-04-11 PROCEDURE — 80048 BASIC METABOLIC PNL TOTAL CA: CPT | Performed by: INTERNAL MEDICINE

## 2018-04-11 PROCEDURE — 25010000002 REGADENOSON 0.4 MG/5ML SOLUTION: Performed by: INTERNAL MEDICINE

## 2018-04-11 PROCEDURE — 93005 ELECTROCARDIOGRAM TRACING: CPT | Performed by: INTERNAL MEDICINE

## 2018-04-11 PROCEDURE — 93017 CV STRESS TEST TRACING ONLY: CPT

## 2018-04-11 PROCEDURE — A9500 TC99M SESTAMIBI: HCPCS | Performed by: INTERNAL MEDICINE

## 2018-04-11 PROCEDURE — 85025 COMPLETE CBC W/AUTO DIFF WBC: CPT | Performed by: INTERNAL MEDICINE

## 2018-04-11 PROCEDURE — 93010 ELECTROCARDIOGRAM REPORT: CPT | Performed by: INTERNAL MEDICINE

## 2018-04-11 PROCEDURE — 78452 HT MUSCLE IMAGE SPECT MULT: CPT

## 2018-04-11 PROCEDURE — 96372 THER/PROPH/DIAG INJ SC/IM: CPT

## 2018-04-11 RX ORDER — LOSARTAN POTASSIUM 50 MG/1
100 TABLET ORAL
Status: DISCONTINUED | OUTPATIENT
Start: 2018-04-11 | End: 2018-04-12 | Stop reason: HOSPADM

## 2018-04-11 RX ADMIN — HEPARIN SODIUM 5000 UNITS: 5000 INJECTION, SOLUTION INTRAVENOUS; SUBCUTANEOUS at 20:06

## 2018-04-11 RX ADMIN — TERAZOSIN HYDROCHLORIDE 1 MG: 1 CAPSULE ORAL at 20:06

## 2018-04-11 RX ADMIN — INSULIN ASPART 2 UNITS: 100 INJECTION, SOLUTION INTRAVENOUS; SUBCUTANEOUS at 20:06

## 2018-04-11 RX ADMIN — ATORVASTATIN CALCIUM 10 MG: 10 TABLET, FILM COATED ORAL at 20:06

## 2018-04-11 RX ADMIN — TECHNETIUM TC 99M SESTAMIBI 1 DOSE: 1 INJECTION INTRAVENOUS at 09:05

## 2018-04-11 RX ADMIN — REGADENOSON 0.4 MG: 0.08 INJECTION, SOLUTION INTRAVENOUS at 12:10

## 2018-04-11 RX ADMIN — HEPARIN SODIUM 5000 UNITS: 5000 INJECTION, SOLUTION INTRAVENOUS; SUBCUTANEOUS at 08:53

## 2018-04-11 RX ADMIN — TECHNETIUM TC 99M SESTAMIBI 1 DOSE: 1 INJECTION INTRAVENOUS at 12:10

## 2018-04-11 RX ADMIN — ASPIRIN 81 MG: 81 TABLET ORAL at 13:33

## 2018-04-11 RX ADMIN — INSULIN ASPART 6 UNITS: 100 INJECTION, SOLUTION INTRAVENOUS; SUBCUTANEOUS at 16:05

## 2018-04-11 RX ADMIN — LOSARTAN POTASSIUM 100 MG: 50 TABLET, FILM COATED ORAL at 20:06

## 2018-04-11 NOTE — CONSULTS
Referring Provider: -Dr Wadsworth    PPatient Identification:  Name:  Rachana Justin  Age:  77 y.o.  Sex:  female  :  1940  MRN:  [id number]   Visit Number:  [id number]  Primary Care Physician:  Shabnam De La Rosa MD    Reason for Consultation: -Chest pain and bradycardia     Chief complaint     Chest pain and slow heart rate      History of present illness:      77-year-old woman has been admitted with history of chest pain and slow heart rate     chest pain-  she has history of retrosternal chest pain which she described it as pressure, intermittent, moderate in intensity, with no definite aggravating or relieving factors and no radiation and not associated with diaphoresis.  Myocardial infarction was ruled out.  She had a nuclear stress test done today which showed no evidence of ischemia.  At present she is chest pain-free.    Slow heart rate-she was found to have heart rate of 30s in the PCPs office yesterday.  She was taking metoprolol ER 12.5 mg by mouth daily and Cardizem  mg by mouth daily which has been withheld.  Telemetric monitoring showed evidence of first degree AV block and second-degree AV block-Mobitz type I.  At present she is having sinus bradycardia with a heart rate of 45-50 bpm.  Occasional dizziness.    Dyspnea on exertion functional class II.  Bilateral leg edema present.  No history of PND, orthopnea or palpitations.  Not a smoker.    No history of known cardiac illness.  No history of known coronary artery disease, prior MI, CHF or cardiac arrhythmia other than history of irregular heartbeat-not diagnosed.  No previous history of atrial fibrillation.    Cardiac risk factors-DM type II, hypertension and hyperlipidemia      Review of Systems     Constitutional-fatigue  ENT-none  Cardiovascular-as above  Respiratory-shortness of breath  GI-stomach problem  Endocrine-lipid disorder and diabetes  Musculoskeletal-arthritis pain  Review of father organ system involvement-negative  "    History  Past Medical History:   Diagnosis Date   • Atrial fibrillation    • Diabetes mellitus    • Hyperlipidemia    • Hypertension    , Past Surgical History:   Procedure Laterality Date   • CATARACT EXTRACTION Bilateral    • FRACTURE SURGERY     , Family History   Problem Relation Age of Onset   • Breast cancer Mother    • Breast cancer Sister    • Breast cancer Sister    , Social History   Substance Use Topics   • Smoking status: Former Smoker   • Smokeless tobacco: Not on file   • Alcohol use No   ,     Medication Review:      aspirin 81 mg Oral Daily   atorvastatin 10 mg Oral Nightly   heparin (porcine) 5,000 Units Subcutaneous Q12H   insulin aspart 0-9 Units Subcutaneous 4x Daily AC & at Bedtime   losartan 100 mg Oral Q24H   terazosin 1 mg Oral Nightly        Allergies:  Review of patient's allergies indicates no known allergies.     Social history-no history of smoking on call or substance abuse     Family history-noncontributory    Objective     Vital Sign Min/Max for last 24 hours  Temp  Min: 97.8 °F (36.6 °C)  Max: 98.1 °F (36.7 °C)   BP  Min: 138/61  Max: 162/76   Pulse  Min: 46  Max: 79   Resp  Min: 18  Max: 20   SpO2  Min: 94 %  Max: 98 %   No Data Recorded   Weight  Min: 104 kg (228 lb 11.2 oz)  Max: 104 kg (228 lb 11.2 oz)     Flowsheet Rows    Flowsheet Row First Filed Value   Admission Height 175.3 cm (69\") Documented at 04/10/2018 1253   Admission Weight 97.5 kg (215 lb) Documented at 04/10/2018 1253             Physical Exam:     General Appearance:    Alert, cooperative, in no acute distress   Head:    Normocephalic, without obvious abnormality, atraumatic   Eyes:            Lids and lashes normal, conjunctivae and sclerae normal, no   icterus, no pallor, corneas clear, PERRLA   Ears:    Ears appear intact with no abnormalities noted   Throat:   No oral lesions, no thrush, oral mucosa moist   Neck:   No adenopathy, supple, trachea midline, no thyromegaly, no   carotid bruit, no JVD   Back:   "   No kyphosis present, no scoliosis present, no skin lesions,      erythema or scars, no tenderness to percussion or                   palpation,   range of motion normal   Lungs:     Clear to auscultation,respirations regular, even and                  unlabored    Heart:  Bradycardia.  Regular rhythm.  No murmurs    Chest Wall:    No abnormalities observed   Abdomen:     Normal bowel sounds, no masses, no organomegaly, soft        non-tender, non-distended, no guarding, no rebound                tenderness   Rectal:     Deferred   Extremities:   2+ bilateral pitting pedal edema    Pulses:   Pulses palpable and equal bilaterally   Skin:   No bleeding, bruising or rash   Lymph nodes:   No palpable adenopathy   Neurologic:   Cranial nerves 2 - 12 grossly intact, sensation intact, DTR       present and equal bilaterally       Telemetry  Sinus bradycardia.  Heart rate 45-50 bpm.  Review of rhythm strips showed evidence of first-degree AV block and second-degree AV block Mobitz type I     ECG  ECG/EMG Results (last 24 hours)     Procedure Component Value Units Date/Time    ECG 12 Lead [[id number]] Collected:  04/10/18 2148     Updated:  04/11/18 1305    Narrative:       Test Reason : Rhthym change  Blood Pressure : **/** mmHG  Vent. Rate : 038 BPM     Atrial Rate : 038 BPM     P-R Int : 268 ms          QRS Dur : 102 ms      QT Int : 480 ms       P-R-T Axes : 075 020 055 degrees     QTc Int : 381 ms    sinus rhythm with 2-1 AV block , ventricular rate 38/m  Nonspecific T wave abnormality  Abnormal ECG  When compared with ECG of 10-APR-2018 12:55, (Unconfirmed)  Sinus rhythm has replaced Atrial fibrillation  Non-specific change in ST segment in Anterior leads  Confirmed by Esteban Oliva ([id number]) on 4/11/2018 1:05:25 PM    Referred By:  HETAL           Confirmed By:Esteban Oliva    ECG 12 Lead [[id number]] Collected:  04/10/18 1255     Updated:  04/11/18 1306    Narrative:       Test Reason : epigastric pain  Blood  Pressure : **/** mmHG  Vent. Rate : 049 BPM     Atrial Rate : 052 BPM     P-R Int : 000 ms          QRS Dur : 082 ms      QT Int : 470 ms       P-R-T Axes : 000 026 043 degrees     QTc Int : 424 ms    sinus rhythm 3-2 AV block  Nonspecific ST and T wave abnormality  Abnormal ECG  No previous ECGs available  Confirmed by Esteban Oliva ([id number]) on 4/11/2018 1:06:00 PM    Referred By:  JO           Confirmed By:Esteban Oliva    Adult Transthoracic Echo Complete W/ Cont if Necessary Per Protocol [[id number]] Collected:  04/11/18 0923     Updated:  04/11/18 1450     BSA 2.1 m^2      BH CV ECHO ALLAN - RVDD 2.3 cm      IVSd 1.3 cm      IVSs 2.2 cm      LVIDd 5.6 cm      LVIDs 3.2 cm      LVPWd 1.3 cm      BH CV ECHO ALLAN - LVPWS 1.7 cm      IVS/LVPW 1.1     FS 41.6 %      EDV(Teich) 151.3 ml      ESV(Teich) 42.5 ml      EF(Teich) 71.9 %      EDV(cubed) 172.1 ml      ESV(cubed) 34.3 ml      EF(cubed) 80.1 %      % IVS thick 61.6 %      % LVPW thick 38.2 %      LV mass(C)d 309.9 grams      LV mass(C)dI 145.5 grams/m^2      LV mass(C)s 277.1 grams      LV mass(C)sI 130.1 grams/m^2      SV(Teich) 108.8 ml      SI(Teich) 51.1 ml/m^2      SV(cubed) 137.8 ml      SI(cubed) 64.7 ml/m^2      Ao root diam 3.2 cm      Ao root area 8.1 cm^2      ACS 2.0 cm      LA dimension 4.3 cm      LA/Ao 1.3     LVOT diam 1.9 cm      LVOT area 2.9 cm^2      LVOT area(traced) 2.8 cm^2      LVLd ap4 7.5 cm      EDV(MOD-sp4) 70.0 ml      LVLs ap4 6.8 cm      ESV(MOD-sp4) 21.0 ml      SV(MOD-sp4) 49.0 ml      SI(MOD-sp4) 23.0 ml/m^2      Ao root area (BSA corrected) 1.5     CONTRAST EF 4CH 70.0 ml/m^2      LV Diastolic corrected for BSA 32.9 ml/m^2      LV Systolic corrected for BSA 9.9 ml/m^2      MV E max cherelle 116.8 cm/sec      MV A max cherelle 96.5 cm/sec      MV E/A 1.2     PA acc slope 1,480 cm/sec^2      PA acc time 0.09 sec      TR max cherelle 302.7 cm/sec      RVSP(TR) 46.6 mmHg      RAP systole 10.0 mmHg      PA pr(Accel) 39.4 mmHg      BH  CV ECHO ALLAN - BZI_BMI 31.8 kilograms/m^2       CV ECHO ALLAN - BSA(HAYCOCK) 2.2 m^2       CV ECHO ALLAN - BZI_METRIC_WEIGHT 97.5 kg       CV ECHO ALLAN - BZI_METRIC_HEIGHT 175.3 cm      Target HR (85%) 120 bpm      Max. Pred. HR (100%) 141 bpm     Narrative:       · Left ventricular systolic function is normal. Estimated EF appears to be   in the range of 61 - 65%  · All left ventricular wall segments contract normally.  · Left ventricular diastolic dysfunction (grade II) consistent with   pseudonormalization.  · Left atrial cavity size is mildly dilated.  · The aortic valve is structurally normal. No aortic valve regurgitation   is present. No aortic valve stenosis is present.  · Mild mitral valve regurgitation is present  · Mild tricuspid valve regurgitation is present.  · Moderate tricuspid valve stenosis is present.  · Moderate pulmonary hypertension is present  · There is no evidence of pericardial effusion.       ECG 12 Lead [[id number]] Collected:  04/11/18 1723     Updated:  04/11/18 1724    Narrative:       Test Reason : possible 2nd degree heart block  Blood Pressure : **/** mmHG  Vent. Rate : 040 BPM     Atrial Rate : 079 BPM     P-R Int : 272 ms          QRS Dur : 118 ms      QT Int : 476 ms       P-R-T Axes : 073 057 055 degrees     QTc Int : 387 ms    Sinus rhythm with 2nd degree AV block with 2:1 AV conduction  Right bundle branch block  Abnormal ECG  When compared with ECG of 10-APR-2018 21:48,  Previous ECG has undetermined rhythm, needs review  Right bundle branch block is now present    Referred By:  HETAL           Confirmed By:           Nuclear stress test-    No evidence of ischemia.  LVEF-normal    Labs    Results from last 7 days  Lab Units 04/11/18  0044 04/10/18  1338   WBC 10*3/mm3 5.45 4.89   HEMOGLOBIN g/dL 9.6* 10.8*   HEMATOCRIT % 31.0* 35.0*   PLATELETS 10*3/mm3 172 180       Results from last 7 days  Lab Units 04/11/18  0044 04/10/18  1338   SODIUM mmol/L 135 133*   POTASSIUM  mmol/L 4.4 4.6   CHLORIDE mmol/L 106 104   CO2 mmol/L 23.8* 23.7*   BUN mg/dL 22* 24*   CREATININE mg/dL 1.24 1.40*   CALCIUM mg/dL 9.4 10.0   GLUCOSE mg/dL 168* 229*       Results from last 7 days  Lab Units 04/10/18  1338   BILIRUBIN mg/dL 0.4   ALK PHOS U/L 115*   AST (SGOT) U/L 19   ALT (SGPT) U/L 18                   Results from last 7 days  Lab Units 04/11/18  1746 04/11/18  1415 04/11/18  0505   TROPONIN I ng/mL 0.023 0.020 0.019             Imaging  Imaging Results (last 24 hours)     ** No results found for the last 24 hours. **            Assessment     1.  Chest pain.  Stable.  No MI.  Nuclear stress test showed no evidence of ischemia.     2.Bradyarrhythmia-spectrum include sinus bradycardia, first degree AV block and second-degree AV block-Mobitz type I.  Due to medications-metoprolol and Cardizem  3.  Multiple cardiac risk factors-DM type II, hypertension and hyperlipidemia  4.  Chronic kidney disease  5.  Bilateral pedal edema    Plan    1.  Results of the stress test and echocardiogram were discussed with the patient and reassured    2.  Agree with holding metoprolol on Cardizem CD due to bradycardia  3.  Obtain TSH level to rule out hypothyroidism  4.  Resume losartan 100 mg by mouth daily for hypertension  5.  Patient has bilateral leg edema but has renal failure.  Will hold diuretic at this time.    I discussed the patients findings and my recommendations with patient    Jamee Gibbs MD  04/11/18  6:38 PM

## 2018-04-11 NOTE — PLAN OF CARE
Problem: Patient Care Overview  Goal: Plan of Care Review  Outcome: Ongoing (interventions implemented as appropriate)    Goal: Individualization and Mutuality  Outcome: Ongoing (interventions implemented as appropriate)      Problem: Cardiac: ACS (Acute Coronary Syndrome) (Adult)  Goal: Signs and Symptoms of Listed Potential Problems Will be Absent, Minimized or Managed (Cardiac: ACS)  Outcome: Ongoing (interventions implemented as appropriate)      Problem: Cardiac Output Decreased (Adult)  Goal: Identify Related Risk Factors and Signs and Symptoms  Outcome: Ongoing (interventions implemented as appropriate)

## 2018-04-11 NOTE — PROGRESS NOTES
[facility] HOSPITALIST PROGRESS NOTE     Patient Identification:  Name:  Rachana Justin  Age:  77 y.o.  Sex:  female  :  1940  MRN:  [id number]  Visit Number:  [id number]  Primary Care Provider:  Shabnam De La Rosa MD    Length of stay:  0    Chief complaint:  Chest pain    Subjective:  No further significant chest pain.  She had what they felt to be second-degree type II heart block during the stress test.  She is asymptomatic with it and her vital signs are stable.  I have reviewed all available strips and it appears as though she has a consistent U-wave with consistent MA intervals and not dropped QRSs.  Unfortunately there is a lot of artifact in the baseline  ----------------------------------------------------------------------------------------------------------------------  Current Hospital Meds:    aspirin 81 mg Oral Daily   atorvastatin 10 mg Oral Nightly   heparin (porcine) 5,000 Units Subcutaneous Q12H   insulin aspart 0-9 Units Subcutaneous 4x Daily AC & at Bedtime   terazosin 1 mg Oral Nightly        ----------------------------------------------------------------------------------------------------------------------  Vital Signs:  Temp:  [97.8 °F (36.6 °C)-98.1 °F (36.7 °C)] 98.1 °F (36.7 °C)  Heart Rate:  [46-79] 79  Resp:  [18-20] 18  BP: (138-162)/(59-76) 138/61  1    04/10/18  1253 18  0314   Weight: 97.5 kg (215 lb) 104 kg (228 lb 11.2 oz)     Body mass index is 33.77 kg/m².    Intake/Output Summary (Last 24 hours) at 18 1702  Last data filed at 18 0893   Gross per 24 hour   Intake              360 ml   Output             2500 ml   Net            -2140 ml     Diet Regular; Cardiac, Consistent Carbohydrate  ----------------------------------------------------------------------------------------------------------------------  Physical exam:  Constitutional:  Obese elderly white female.  No apparent distress. Appropriately interactive.  Mood appears normal.      HENT:  Head:  Normocephalic and atraumatic.  Mouth:  Moist mucous membranes.    Eyes:  Conjunctivae and EOM are normal. No scleral icterus.    Neck:  Neck supple.  No JVD present.    Cardiovascular:  Normal rate, regular rhythm and normal heart sounds with no murmur.  Pulmonary/Chest:  No respiratory distress, no wheezes, no crackles, with normal breath sounds and good air movement.  Abdominal:  Soft.  Bowel sounds are normal.  No distension and no tenderness.   Musculoskeletal:  No edema, no tenderness, and no deformity.  No red or swollen joints anywhere.    Neurological:  Alert and oriented to person, place, and time.  Moves all extremities equally.  No tongue deviation.  No facial droop.  No slurred speech.   Skin:  Skin is warm and dry. No rash noted. No pallor.   Peripheral vascular:  Palpable pulses in all 4 extremities with no clubbing, no cyanosis, no edema.  Genitourinary:  ----------------------------------------------------------------------------------------------------------------------  Tele:  Sinus bradycardia.  I don't find any evidence of dropped QRSs or block  ----------------------------------------------------------------------------------------------------------------------    Results from last 7 days  Lab Units 04/11/18  1415 04/11/18  0505 04/11/18  0044   TROPONIN I ng/mL 0.020 0.019 0.016       Results from last 7 days  Lab Units 04/11/18  0044 04/10/18  1338   WBC 10*3/mm3 5.45 4.89   HEMOGLOBIN g/dL 9.6* 10.8*   HEMATOCRIT % 31.0* 35.0*   MCV fL 91.4 88.6   MCHC g/dL 31.0* 30.9*   PLATELETS 10*3/mm3 172 180           Results from last 7 days  Lab Units 04/11/18  0044 04/10/18  1338   SODIUM mmol/L 135 133*   POTASSIUM mmol/L 4.4 4.6   CHLORIDE mmol/L 106 104   CO2 mmol/L 23.8* 23.7*   BUN mg/dL 22* 24*   CREATININE mg/dL 1.24 1.40*   EGFR IF NONAFRICN AM mL/min/1.73 42* 36*   CALCIUM mg/dL 9.4 10.0   GLUCOSE mg/dL 168* 229*   ALBUMIN g/dL  --  4.10   BILIRUBIN mg/dL  --  0.4   ALK PHOS  U/L  --  115*   AST (SGOT) U/L  --  19   ALT (SGPT) U/L  --  18   Estimated Creatinine Clearance: 47.2 mL/min (by C-G formula based on SCr of 1.24 mg/dL).    No results found for: AMMONIA    Results from last 7 days  Lab Units 04/10/18  1338   CHOLESTEROL mg/dL 123   TRIGLYCERIDES mg/dL 101   HDL CHOL mg/dL 52*   LDL CHOL mg/dL 51                   I have personally looked at the labs and they are summarized above.  ----------------------------------------------------------------------------------------------------------------------  Imaging Results (last 24 hours)     ** No results found for the last 24 hours. **        ----------------------------------------------------------------------------------------------------------------------  Assessment and Plan:  Chest pain, concerning for cardiac source  Atrial fibrillation with slow ventricular response  Now appears to be sinus bradycardia With possible second degree heart block  Grade 2 diastolic dysfunction on heart echo  Hypertension  Hyperlipidemia  Stress test showed no evidence of ischemia  Continue aspirin and statin  We'll hold the Cardizem and metoprolol for now with the significant bradycardia.  I'm going to consult cardiology and get a repeat EKG to assess for type II heart block since the telemetry strips have poor baseline view    Chronic kidney disease stage III  Creatinine stable  Avoid nephrotoxins and renally dose meds  BMP in the morning    Diabetes mellitus type 2  Hold the glyburide and metformin and Actos as well  Fingerstick glucoses with sliding scale insulin coverage     DVT prophylaxis  Subcutaneous heparin      Alonso Wadsworth MD  04/11/18  5:02 PM

## 2018-04-11 NOTE — PLAN OF CARE
Problem: Patient Care Overview  Goal: Plan of Care Review  Outcome: Ongoing (interventions implemented as appropriate)    Goal: Individualization and Mutuality  Outcome: Ongoing (interventions implemented as appropriate)    Goal: Discharge Needs Assessment  Outcome: Ongoing (interventions implemented as appropriate)    Goal: Interprofessional Rounds/Family Conf  Outcome: Ongoing (interventions implemented as appropriate)      Problem: Fall Risk (Adult)  Goal: Identify Related Risk Factors and Signs and Symptoms  Outcome: Ongoing (interventions implemented as appropriate)    Goal: Absence of Fall  Outcome: Ongoing (interventions implemented as appropriate)      Problem: Cardiac: ACS (Acute Coronary Syndrome) (Adult)  Goal: Signs and Symptoms of Listed Potential Problems Will be Absent, Minimized or Managed (Cardiac: ACS)  Outcome: Ongoing (interventions implemented as appropriate)      Problem: Cardiac Output Decreased (Adult)  Goal: Identify Related Risk Factors and Signs and Symptoms  Outcome: Ongoing (interventions implemented as appropriate)    Goal: Effective Tissue Perfusion  Outcome: Ongoing (interventions implemented as appropriate)      Problem: Diabetes, Type 2 (Adult)  Goal: Signs and Symptoms of Listed Potential Problems Will be Absent, Minimized or Managed (Diabetes, Type 2)  Outcome: Ongoing (interventions implemented as appropriate)

## 2018-04-12 VITALS
DIASTOLIC BLOOD PRESSURE: 50 MMHG | SYSTOLIC BLOOD PRESSURE: 132 MMHG | HEART RATE: 57 BPM | TEMPERATURE: 98 F | RESPIRATION RATE: 18 BRPM | OXYGEN SATURATION: 95 % | WEIGHT: 227.2 LBS | HEIGHT: 69 IN | BODY MASS INDEX: 33.65 KG/M2

## 2018-04-12 PROBLEM — R07.9 CHEST PAIN: Status: RESOLVED | Noted: 2018-04-10 | Resolved: 2018-04-12

## 2018-04-12 LAB
ANION GAP SERPL CALCULATED.3IONS-SCNC: 6.7 MMOL/L (ref 3.6–11.2)
BUN BLD-MCNC: 21 MG/DL (ref 7–21)
BUN/CREAT SERPL: 17.1 (ref 7–25)
CALCIUM SPEC-SCNC: 9.5 MG/DL (ref 7.7–10)
CHLORIDE SERPL-SCNC: 106 MMOL/L (ref 99–112)
CO2 SERPL-SCNC: 23.3 MMOL/L (ref 24.3–31.9)
CREAT BLD-MCNC: 1.23 MG/DL (ref 0.43–1.29)
GFR SERPL CREATININE-BSD FRML MDRD: 42 ML/MIN/1.73
GLUCOSE BLD-MCNC: 162 MG/DL (ref 70–110)
GLUCOSE BLDC GLUCOMTR-MCNC: 169 MG/DL (ref 70–130)
GLUCOSE BLDC GLUCOMTR-MCNC: 236 MG/DL (ref 70–130)
GLUCOSE BLDC GLUCOMTR-MCNC: 258 MG/DL (ref 70–130)
MAGNESIUM SERPL-MCNC: 1.9 MG/DL (ref 1.7–2.6)
OSMOLALITY SERPL CALC.SUM OF ELEC: 278.5 MOSM/KG (ref 273–305)
POTASSIUM BLD-SCNC: 4.5 MMOL/L (ref 3.5–5.3)
SODIUM BLD-SCNC: 136 MMOL/L (ref 135–153)
TROPONIN I SERPL-MCNC: 0.02 NG/ML
TROPONIN I SERPL-MCNC: 0.03 NG/ML
TROPONIN I SERPL-MCNC: 0.03 NG/ML
TSH SERPL DL<=0.05 MIU/L-ACNC: 4.69 MIU/ML (ref 0.55–4.78)

## 2018-04-12 PROCEDURE — 96372 THER/PROPH/DIAG INJ SC/IM: CPT

## 2018-04-12 PROCEDURE — 25010000002 HEPARIN (PORCINE) PER 1000 UNITS: Performed by: INTERNAL MEDICINE

## 2018-04-12 PROCEDURE — 84484 ASSAY OF TROPONIN QUANT: CPT | Performed by: INTERNAL MEDICINE

## 2018-04-12 PROCEDURE — 82962 GLUCOSE BLOOD TEST: CPT

## 2018-04-12 PROCEDURE — G0378 HOSPITAL OBSERVATION PER HR: HCPCS

## 2018-04-12 PROCEDURE — 96365 THER/PROPH/DIAG IV INF INIT: CPT

## 2018-04-12 PROCEDURE — 63710000001 INSULIN ASPART PER 5 UNITS: Performed by: INTERNAL MEDICINE

## 2018-04-12 PROCEDURE — 80048 BASIC METABOLIC PNL TOTAL CA: CPT | Performed by: INTERNAL MEDICINE

## 2018-04-12 PROCEDURE — 96366 THER/PROPH/DIAG IV INF ADDON: CPT

## 2018-04-12 PROCEDURE — 99217 PR OBSERVATION CARE DISCHARGE MANAGEMENT: CPT | Performed by: INTERNAL MEDICINE

## 2018-04-12 PROCEDURE — 84443 ASSAY THYROID STIM HORMONE: CPT | Performed by: INTERNAL MEDICINE

## 2018-04-12 PROCEDURE — 83735 ASSAY OF MAGNESIUM: CPT | Performed by: INTERNAL MEDICINE

## 2018-04-12 RX ORDER — MAGNESIUM SULFATE HEPTAHYDRATE 40 MG/ML
4 INJECTION, SOLUTION INTRAVENOUS ONCE
Status: COMPLETED | OUTPATIENT
Start: 2018-04-12 | End: 2018-04-12

## 2018-04-12 RX ORDER — ASPIRIN 81 MG/1
TABLET, CHEWABLE ORAL
Status: COMPLETED
Start: 2018-04-12 | End: 2018-04-12

## 2018-04-12 RX ORDER — AMLODIPINE BESYLATE 5 MG/1
5 TABLET ORAL
Qty: 30 TABLET | Refills: 3 | Status: SHIPPED | OUTPATIENT
Start: 2018-04-13

## 2018-04-12 RX ORDER — AMLODIPINE BESYLATE 5 MG/1
5 TABLET ORAL
Status: DISCONTINUED | OUTPATIENT
Start: 2018-04-12 | End: 2018-04-12 | Stop reason: HOSPADM

## 2018-04-12 RX ORDER — ATORVASTATIN CALCIUM 10 MG/1
10 TABLET, FILM COATED ORAL NIGHTLY
Qty: 30 TABLET | Refills: 3 | Status: SHIPPED | OUTPATIENT
Start: 2018-04-12

## 2018-04-12 RX ADMIN — AMLODIPINE BESYLATE 5 MG: 5 TABLET ORAL at 12:24

## 2018-04-12 RX ADMIN — INSULIN ASPART 2 UNITS: 100 INJECTION, SOLUTION INTRAVENOUS; SUBCUTANEOUS at 08:38

## 2018-04-12 RX ADMIN — LOSARTAN POTASSIUM 100 MG: 50 TABLET, FILM COATED ORAL at 08:39

## 2018-04-12 RX ADMIN — MAGNESIUM SULFATE HEPTAHYDRATE 4 G: 40 INJECTION, SOLUTION INTRAVENOUS at 04:02

## 2018-04-12 RX ADMIN — ASPIRIN 81 MG: 81 TABLET, CHEWABLE ORAL at 08:39

## 2018-04-12 RX ADMIN — INSULIN ASPART 6 UNITS: 100 INJECTION, SOLUTION INTRAVENOUS; SUBCUTANEOUS at 12:24

## 2018-04-12 RX ADMIN — HEPARIN SODIUM 5000 UNITS: 5000 INJECTION, SOLUTION INTRAVENOUS; SUBCUTANEOUS at 08:38

## 2018-04-12 NOTE — PROGRESS NOTES
"Reason for follow-up:  Chest pain and bradycardia    Subjective:    She denied history of chest pain.  Nuclear stress test done yesterday showed no evidence of ischemia.  Bradycardia-improving.  Telemetry showed sinus bradycardia with a heart rate 50-50 5 bpm.  With intermittent second-degree AV block-Mobitz type I.  No history of dizziness or lightheadedness.  Blood pressure is high.      Medication Review:     amLODIPine 5 mg Oral Q24H   aspirin 81 mg Oral Daily   atorvastatin 10 mg Oral Nightly   heparin (porcine) 5,000 Units Subcutaneous Q12H   insulin aspart 0-9 Units Subcutaneous 4x Daily AC & at Bedtime   losartan 100 mg Oral Q24H   terazosin 1 mg Oral Nightly         Vital Sign Min/Max for last 24 hours  Temp  Min: 97.8 °F (36.6 °C)  Max: 98.2 °F (36.8 °C)   BP  Min: 138/61  Max: 164/84   Pulse  Min: 50  Max: 88   Resp  Min: 18  Max: 18   SpO2  Min: 96 %  Max: 98 %   No Data Recorded   Weight  Min: 103 kg (227 lb 3.2 oz)  Max: 103 kg (227 lb 3.2 oz)     Flowsheet Rows    Flowsheet Row First Filed Value   Admission Height 175.3 cm (69\") Documented at 04/10/2018 1253   Admission Weight 97.5 kg (215 lb) Documented at 04/10/2018 1253          Physical Exam:     General Appearance:    Alert, cooperative, in no acute distress   Head:    Normocephalic, without obvious abnormality, atraumatic   Eyes:            Lids and lashes normal, conjunctivae and sclerae normal, no   icterus, no pallor, corneas clear, PERRLA   Ears:    Ears appear intact with no abnormalities noted   Throat:   No oral lesions, no thrush, oral mucosa moist   Neck:   No adenopathy, supple, trachea midline, no thyromegaly, no   carotid bruit, no JVD   Back:     No kyphosis present, no scoliosis present, no skin lesions,      erythema or scars, no tenderness to percussion or                   palpation,   range of motion normal   Lungs:     Clear to auscultation,respirations regular, even and                  unlabored    Heart:  Bradycardia.  " Regular rhythm.  No murmurs    Chest Wall:    No abnormalities observed   Abdomen:     Normal bowel sounds, no masses, no organomegaly, soft        non-tender, non-distended, no guarding, no rebound                tenderness   Rectal:     Deferred   Extremities:   2+ bilateral pitting pedal edema           Telemetry  Sinus bradycardia.  Heart rate 50-50 5 bpm.  Intermittent second-degree AV block Mobitz type I.          Labs    Results from last 7 days  Lab Units 04/11/18  0044 04/10/18  1338   WBC 10*3/mm3 5.45 4.89   HEMOGLOBIN g/dL 9.6* 10.8*   HEMATOCRIT % 31.0* 35.0*   PLATELETS 10*3/mm3 172 180       Results from last 7 days  Lab Units 04/12/18  0052 04/11/18  0044 04/10/18  1338   SODIUM mmol/L 136 135 133*   POTASSIUM mmol/L 4.5 4.4 4.6   CHLORIDE mmol/L 106 106 104   CO2 mmol/L 23.3* 23.8* 23.7*   BUN mg/dL 21 22* 24*   CREATININE mg/dL 1.23 1.24 1.40*   CALCIUM mg/dL 9.5 9.4 10.0   GLUCOSE mg/dL 162* 168* 229*       Results from last 7 days  Lab Units 04/10/18  1338   BILIRUBIN mg/dL 0.4   ALK PHOS U/L 115*   AST (SGOT) U/L 19   ALT (SGPT) U/L 18       Results from last 7 days  Lab Units 04/12/18  0052   MAGNESIUM mg/dL 1.9               Results from last 7 days  Lab Units 04/12/18  0552 04/12/18  0052 04/11/18  1746   TROPONIN I ng/mL 0.023 0.029 0.023             Assessment    1.  Chest pain.  Stable.  No MI.  Nuclear stress test showed no evidence of ischemia.     2.Bradyarrhythmia-spectrum include sinus bradycardia, first degree AV block and second-degree AV block-Mobitz type I.  Due to medications-metoprolol and Cardizem.  Improving with discontinuation of these medications.  Asymptomatic  3.  Multiple cardiac risk factors-DM type II, hypertension and hyperlipidemia  4.  Chronic kidney disease  5.  Bilateral pedal edema  6.  Hypertension-uncontrolled    Plan    1.  Continue losartan 100 mg by mouth daily and start Norvasc 5 mg by mouth daily for hypertension  2.  No more metoprolol and Cardizem  3.   Patient wants to go home.  As bradycardia is improving and asymptomatic, it is okay from cardiac standpoint a few to discharge her with a follow-up with PCP in one week to monitor HR and BP    .    Jamee Gibbs MD  04/12/18  10:37 AM

## 2018-04-12 NOTE — PLAN OF CARE
Problem: Patient Care Overview  Goal: Plan of Care Review  Outcome: Ongoing (interventions implemented as appropriate)      Problem: Fall Risk (Adult)  Goal: Identify Related Risk Factors and Signs and Symptoms  Outcome: Ongoing (interventions implemented as appropriate)    Goal: Absence of Fall  Outcome: Ongoing (interventions implemented as appropriate)      Problem: Cardiac: ACS (Acute Coronary Syndrome) (Adult)  Goal: Signs and Symptoms of Listed Potential Problems Will be Absent, Minimized or Managed (Cardiac: ACS)  Outcome: Ongoing (interventions implemented as appropriate)      Problem: Cardiac Output Decreased (Adult)  Goal: Identify Related Risk Factors and Signs and Symptoms  Outcome: Ongoing (interventions implemented as appropriate)    Goal: Effective Tissue Perfusion  Outcome: Ongoing (interventions implemented as appropriate)      Problem: Diabetes, Type 2 (Adult)  Goal: Signs and Symptoms of Listed Potential Problems Will be Absent, Minimized or Managed (Diabetes, Type 2)  Outcome: Ongoing (interventions implemented as appropriate)

## 2018-04-13 ENCOUNTER — TRANSCRIBE ORDERS (OUTPATIENT)
Dept: ADMINISTRATIVE | Facility: HOSPITAL | Age: 78
End: 2018-04-13

## 2018-04-13 ENCOUNTER — HOSPITAL ENCOUNTER (OUTPATIENT)
Dept: RESPIRATORY THERAPY | Facility: HOSPITAL | Age: 78
Discharge: HOME | End: 2018-04-13
Attending: INTERNAL MEDICINE | Admitting: INTERNAL MEDICINE

## 2018-04-13 DIAGNOSIS — I49.9 CARDIAC ARRHYTHMIA, UNSPECIFIED CARDIAC ARRHYTHMIA TYPE: Primary | ICD-10-CM

## 2018-04-13 DIAGNOSIS — I49.9 CARDIAC ARRHYTHMIA, UNSPECIFIED CARDIAC ARRHYTHMIA TYPE: ICD-10-CM

## 2018-04-13 PROCEDURE — 93226 XTRNL ECG REC<48 HR SCAN A/R: CPT

## 2018-04-13 PROCEDURE — 93225 XTRNL ECG REC<48 HRS REC: CPT

## 2018-06-26 ENCOUNTER — HOSPITAL ENCOUNTER (OUTPATIENT)
Dept: INFUSION THERAPY | Facility: HOSPITAL | Age: 78
Discharge: HOME | End: 2018-06-26
Admitting: NURSE PRACTITIONER

## 2018-06-26 ENCOUNTER — TRANSCRIBE ORDERS (OUTPATIENT)
Dept: ADMINISTRATIVE | Facility: HOSPITAL | Age: 78
End: 2018-06-26

## 2018-06-26 VITALS
RESPIRATION RATE: 20 BRPM | SYSTOLIC BLOOD PRESSURE: 144 MMHG | HEIGHT: 69 IN | BODY MASS INDEX: 31.84 KG/M2 | WEIGHT: 215 LBS | TEMPERATURE: 98.3 F | DIASTOLIC BLOOD PRESSURE: 58 MMHG | HEART RATE: 70 BPM

## 2018-06-26 DIAGNOSIS — N39.0 URINARY TRACT INFECTION WITHOUT HEMATURIA, SITE UNSPECIFIED: Primary | ICD-10-CM

## 2018-06-26 DIAGNOSIS — N39.0 URINARY TRACT INFECTION WITHOUT HEMATURIA, SITE UNSPECIFIED: ICD-10-CM

## 2018-06-26 PROCEDURE — 25010000002 ERTAPENEM PER 500 MG: Performed by: NURSE PRACTITIONER

## 2018-06-26 PROCEDURE — 96372 THER/PROPH/DIAG INJ SC/IM: CPT

## 2018-06-26 RX ORDER — LIDOCAINE HYDROCHLORIDE 10 MG/ML
5 INJECTION, SOLUTION EPIDURAL; INFILTRATION; INTRACAUDAL; PERINEURAL ONCE
Status: CANCELLED
Start: 2018-06-27 | End: 2018-06-27

## 2018-06-26 RX ORDER — LIDOCAINE HYDROCHLORIDE 10 MG/ML
5 INJECTION, SOLUTION EPIDURAL; INFILTRATION; INTRACAUDAL; PERINEURAL ONCE
Status: COMPLETED | OUTPATIENT
Start: 2018-06-26 | End: 2018-06-26

## 2018-06-26 RX ORDER — ERTAPENEM 1 G/1
1 INJECTION, POWDER, LYOPHILIZED, FOR SOLUTION INTRAMUSCULAR; INTRAVENOUS ONCE
Status: COMPLETED | OUTPATIENT
Start: 2018-06-26 | End: 2018-06-26

## 2018-06-26 RX ORDER — ERTAPENEM 1 G/1
1 INJECTION, POWDER, LYOPHILIZED, FOR SOLUTION INTRAMUSCULAR; INTRAVENOUS ONCE
Status: CANCELLED
Start: 2018-06-27 | End: 2018-06-27

## 2018-06-26 RX ADMIN — LIDOCAINE HYDROCHLORIDE 3.2 ML: 10 INJECTION, SOLUTION EPIDURAL; INFILTRATION; INTRACAUDAL; PERINEURAL at 15:17

## 2018-06-26 RX ADMIN — ERTAPENEM SODIUM 1 G: 1 INJECTION, POWDER, LYOPHILIZED, FOR SOLUTION INTRAMUSCULAR; INTRAVENOUS at 15:17

## 2018-06-26 NOTE — PATIENT INSTRUCTIONS
Ertapenem injection  What is this medicine?  ERTAPENEM (er ta PEN em) is a carbapenem antibiotic. It is used to treat certain kinds of bacterial infections. It will not work for colds, flu, or other viral infections.  This medicine may be used for other purposes; ask your health care provider or pharmacist if you have questions.  COMMON BRAND NAME(S): Invanz  What should I tell my health care provider before I take this medicine?  They need to know if you have any of these conditions:  -brain tumor, lesion  -kidney disease  -seizure disorder  -an unusual or allergic reaction to ertapenem, other antibiotics, amide local anesthetics like lidocaine, or other medicines, foods, dyes, or preservatives  -pregnant or trying to get pregnant  -breast-feeding  How should I use this medicine?  This medicine is infused into a vein or injected deep into a muscle. It is usually given by a health care professional in a hospital or clinic setting.  If you get this medicine at home, you will be taught how to prepare and give this medicine. Use exactly as directed. Take your medicine at regular intervals. Do not take your medicine more often than directed.  It is important that you put your used needles and syringes in a special sharps container. Do not put them in a trash can. If you do not have a sharps container, call your pharmacist or healthcare provider to get one.  Talk to your pediatrician regarding the use of this medicine in children. While this drug may be prescribed for children as young as 3 months old for selected conditions, precautions do apply.  Overdosage: If you think you have taken too much of this medicine contact a poison control center or emergency room at once.  NOTE: This medicine is only for you. Do not share this medicine with others.  What if I miss a dose?  If you miss a dose, take it as soon as you can. If it is almost time for your next dose, take only that dose. Do not take double or extra doses.  What  may interact with this medicine?  -birth control pills  -probenecid  This list may not describe all possible interactions. Give your health care provider a list of all the medicines, herbs, non-prescription drugs, or dietary supplements you use. Also tell them if you smoke, drink alcohol, or use illegal drugs. Some items may interact with your medicine.  What should I watch for while using this medicine?  Tell your doctor or health care professional if your symptoms do not improve or if you get new symptoms. Your doctor will monitor your condition and blood work as needed.  Do not treat diarrhea with over the counter products. Contact your doctor if you have diarrhea that lasts more than 2 days or if it is severe and watery.  What side effects may I notice from receiving this medicine?  Side effects that you should report to your doctor or health care professional as soon as possible:  -allergic reactions like skin rash, itching or hives, swelling of the face, lips, or tongue  -anxiety, confusion, dizzy  -chest pain  -difficulty breathing, wheezing  -edema, swelling  -fever  -irregular heart rate, blood pressure  -pain or difficulty passing urine  -seizures  -unusually weak or tired  -white or red patches in mouth  Side effects that usually do not require medical attention (report to your doctor or health care professional if they continue or are bothersome):  -constipation or diarrhea  -difficulty sleeping  -headache  -nausea, vomiting  -pain, swelling or irritation where injected  -stomach upset  -vaginal itch, irritation  This list may not describe all possible side effects. Call your doctor for medical advice about side effects. You may report side effects to FDA at 2-467-FDA-6877.  Where should I keep my medicine?  Keep out of the reach of children.  You will be instructed on how to store this medicine. Throw away any unused medicine after the expiration date on the label.  NOTE: This sheet is a summary. It may  not cover all possible information. If you have questions about this medicine, talk to your doctor, pharmacist, or health care provider.  © 2018 Elsevier/Gold Standard (2014-07-25 07:36:44)  Lidocaine injection  What is this medicine?  LIDOCAINE (YONNYFRANNIE perryfrannie dickson) is an anesthetic. It causes loss of feeling in the skin or other tissues. It is used to prevent and to treat pain from some procedures.  This medicine may be used for other purposes; ask your health care provider or pharmacist if you have questions.  COMMON BRAND NAME(S): Xylocaine, Xylocaine MPF  What should I tell my health care provider before I take this medicine?  They need to know if you have any of these conditions:  -infection  -an unusual or allergic reaction to lidocaine, other medicines, foods, dyes, or preservatives  -pregnant or trying to get pregnant  -breast-feeding  How should I use this medicine?  This medicine is for injection into the affected area. It is given by a health care professional in a hospital or clinic setting.  Talk to your pediatrician regarding the use of this medicine in children. Special care may be needed.  Overdosage: If you think you have taken too much of this medicine contact a poison control center or emergency room at once.  NOTE: This medicine is only for you. Do not share this medicine with others.  What if I miss a dose?  This does not apply.  What may interact with this medicine?  Do not take this medicine with any of the following medications:  -dofetilide  -MAOIs like Carbex, Eldepryl, Marplan, Nardil, and Parnate  This medicine may also interact with the following medications:  -medicines for blood pressure, heart disease, or irregular heart beat  -medicines for depression, anxiety, or psychotic disturbances  -other anesthetics  -phenytoin  -procarbazine  This list may not describe all possible interactions. Give your health care provider a list of all the medicines, herbs, non-prescription drugs, or dietary  supplements you use. Also tell them if you smoke, drink alcohol, or use illegal drugs. Some items may interact with your medicine.  What should I watch for while using this medicine?  Your condition will be monitored carefully while you are receiving this medicine.  Be careful to avoid injury while the area is numb and you are not aware of pain.  What side effects may I notice from receiving this medicine?  Side effects that you should report to your doctor or health care professional as soon as possible:  -allergic reactions like skin rash, itching or hives, swelling of the face, lips, or tongue  -breathing problems  -changes in vision  -chest pain  -feeling faint or lightheaded, falls  -headache  -seizures  -slow, irregular heartbeat  -trembling or shaking  -unusually weak or tired  Side effects that usually do not require medical attention (report to your doctor or health care professional if they continue or are bothersome):  -anxiety or nervousness  -backache  -feelings of cold, heat, or numb  -irritation at site where injected  -nausea, vomiting  This list may not describe all possible side effects. Call your doctor for medical advice about side effects. You may report side effects to FDA at 0-228-FDA-0823.  Where should I keep my medicine?  This drug is given in a hospital or clinic and will not be stored at home.  NOTE: This sheet is a summary. It may not cover all possible information. If you have questions about this medicine, talk to your doctor, pharmacist, or health care provider.  © 2018 Elsevier/Gold Standard (2009-03-05 10:55:25)

## 2018-06-27 ENCOUNTER — TRANSCRIBE ORDERS (OUTPATIENT)
Dept: INFUSION THERAPY | Facility: HOSPITAL | Age: 78
End: 2018-06-27

## 2018-06-27 ENCOUNTER — HOSPITAL ENCOUNTER (OUTPATIENT)
Dept: INFUSION THERAPY | Facility: HOSPITAL | Age: 78
Setting detail: INFUSION SERIES
Discharge: HOME | End: 2018-06-27

## 2018-06-27 VITALS
HEART RATE: 60 BPM | BODY MASS INDEX: 31.84 KG/M2 | SYSTOLIC BLOOD PRESSURE: 131 MMHG | HEIGHT: 69 IN | RESPIRATION RATE: 20 BRPM | DIASTOLIC BLOOD PRESSURE: 56 MMHG | TEMPERATURE: 98.3 F | WEIGHT: 215 LBS

## 2018-06-27 DIAGNOSIS — N39.0 URINARY TRACT INFECTION WITHOUT HEMATURIA, SITE UNSPECIFIED: ICD-10-CM

## 2018-06-27 DIAGNOSIS — N39.0 URINARY TRACT INFECTION WITHOUT HEMATURIA, SITE UNSPECIFIED: Primary | ICD-10-CM

## 2018-06-27 PROCEDURE — 25010000002 ERTAPENEM PER 500 MG: Performed by: NURSE PRACTITIONER

## 2018-06-27 PROCEDURE — 96372 THER/PROPH/DIAG INJ SC/IM: CPT

## 2018-06-27 RX ORDER — ERTAPENEM 1 G/1
1 INJECTION, POWDER, LYOPHILIZED, FOR SOLUTION INTRAMUSCULAR; INTRAVENOUS ONCE
Status: CANCELLED
Start: 2018-06-27 | End: 2018-06-27

## 2018-06-27 RX ORDER — ERTAPENEM 1 G/1
1 INJECTION, POWDER, LYOPHILIZED, FOR SOLUTION INTRAMUSCULAR; INTRAVENOUS ONCE
Status: COMPLETED | OUTPATIENT
Start: 2018-06-27 | End: 2018-06-27

## 2018-06-27 RX ORDER — LIDOCAINE HYDROCHLORIDE 10 MG/ML
5 INJECTION, SOLUTION EPIDURAL; INFILTRATION; INTRACAUDAL; PERINEURAL ONCE
Status: COMPLETED | OUTPATIENT
Start: 2018-06-27 | End: 2018-06-27

## 2018-06-27 RX ORDER — LIDOCAINE HYDROCHLORIDE 10 MG/ML
5 INJECTION, SOLUTION EPIDURAL; INFILTRATION; INTRACAUDAL; PERINEURAL ONCE
Status: CANCELLED
Start: 2018-06-27 | End: 2018-06-27

## 2018-06-27 RX ADMIN — LIDOCAINE HYDROCHLORIDE 5 ML: 10 INJECTION, SOLUTION EPIDURAL; INFILTRATION; INTRACAUDAL; PERINEURAL at 15:09

## 2018-06-27 RX ADMIN — ERTAPENEM SODIUM 1 G: 1 INJECTION, POWDER, LYOPHILIZED, FOR SOLUTION INTRAMUSCULAR; INTRAVENOUS at 15:09

## 2018-06-28 ENCOUNTER — HOSPITAL ENCOUNTER (OUTPATIENT)
Dept: INFUSION THERAPY | Facility: HOSPITAL | Age: 78
Setting detail: INFUSION SERIES
Discharge: HOME | End: 2018-06-28

## 2018-06-28 VITALS
HEART RATE: 70 BPM | RESPIRATION RATE: 17 BRPM | TEMPERATURE: 98 F | DIASTOLIC BLOOD PRESSURE: 78 MMHG | SYSTOLIC BLOOD PRESSURE: 113 MMHG

## 2018-06-28 DIAGNOSIS — N39.0 URINARY TRACT INFECTION WITHOUT HEMATURIA, SITE UNSPECIFIED: ICD-10-CM

## 2018-06-28 PROCEDURE — 96372 THER/PROPH/DIAG INJ SC/IM: CPT

## 2018-06-28 PROCEDURE — 25010000002 ERTAPENEM PER 500 MG: Performed by: NURSE PRACTITIONER

## 2018-06-28 RX ORDER — LIDOCAINE HYDROCHLORIDE 10 MG/ML
5 INJECTION, SOLUTION EPIDURAL; INFILTRATION; INTRACAUDAL; PERINEURAL ONCE
Status: COMPLETED | OUTPATIENT
Start: 2018-06-28 | End: 2018-06-28

## 2018-06-28 RX ORDER — FERROUS SULFATE 325(65) MG
325 TABLET ORAL DAILY
COMMUNITY
End: 2021-03-24

## 2018-06-28 RX ORDER — ERTAPENEM 1 G/1
1 INJECTION, POWDER, LYOPHILIZED, FOR SOLUTION INTRAMUSCULAR; INTRAVENOUS ONCE
Status: CANCELLED
Start: 2018-06-28 | End: 2018-06-28

## 2018-06-28 RX ORDER — ERTAPENEM 1 G/1
1 INJECTION, POWDER, LYOPHILIZED, FOR SOLUTION INTRAMUSCULAR; INTRAVENOUS ONCE
Status: COMPLETED | OUTPATIENT
Start: 2018-06-28 | End: 2018-06-28

## 2018-06-28 RX ORDER — LIDOCAINE HYDROCHLORIDE 10 MG/ML
5 INJECTION, SOLUTION EPIDURAL; INFILTRATION; INTRACAUDAL; PERINEURAL ONCE
Status: CANCELLED
Start: 2018-06-28 | End: 2018-06-28

## 2018-06-28 RX ADMIN — LIDOCAINE HYDROCHLORIDE 3.2 ML: 10 INJECTION, SOLUTION EPIDURAL; INFILTRATION; INTRACAUDAL; PERINEURAL at 15:06

## 2018-06-28 RX ADMIN — ERTAPENEM SODIUM 1 G: 1 INJECTION, POWDER, LYOPHILIZED, FOR SOLUTION INTRAMUSCULAR; INTRAVENOUS at 15:05

## 2018-06-29 ENCOUNTER — HOSPITAL ENCOUNTER (OUTPATIENT)
Dept: INFUSION THERAPY | Facility: HOSPITAL | Age: 78
Setting detail: INFUSION SERIES
Discharge: HOME | End: 2018-06-29

## 2018-06-29 VITALS
DIASTOLIC BLOOD PRESSURE: 62 MMHG | SYSTOLIC BLOOD PRESSURE: 131 MMHG | TEMPERATURE: 98.1 F | RESPIRATION RATE: 18 BRPM | HEART RATE: 64 BPM

## 2018-06-29 DIAGNOSIS — N39.0 URINARY TRACT INFECTION WITHOUT HEMATURIA, SITE UNSPECIFIED: ICD-10-CM

## 2018-06-29 PROCEDURE — 96372 THER/PROPH/DIAG INJ SC/IM: CPT

## 2018-06-29 PROCEDURE — 25010000002 ERTAPENEM PER 500 MG: Performed by: NURSE PRACTITIONER

## 2018-06-29 RX ORDER — LIDOCAINE HYDROCHLORIDE 10 MG/ML
5 INJECTION, SOLUTION EPIDURAL; INFILTRATION; INTRACAUDAL; PERINEURAL ONCE
Status: CANCELLED
Start: 2018-06-29 | End: 2018-06-29

## 2018-06-29 RX ORDER — LIDOCAINE HYDROCHLORIDE 10 MG/ML
5 INJECTION, SOLUTION EPIDURAL; INFILTRATION; INTRACAUDAL; PERINEURAL ONCE
Status: COMPLETED | OUTPATIENT
Start: 2018-06-29 | End: 2018-06-29

## 2018-06-29 RX ORDER — ERTAPENEM 1 G/1
1 INJECTION, POWDER, LYOPHILIZED, FOR SOLUTION INTRAMUSCULAR; INTRAVENOUS ONCE
Status: COMPLETED | OUTPATIENT
Start: 2018-06-29 | End: 2018-06-29

## 2018-06-29 RX ORDER — ERTAPENEM 1 G/1
1 INJECTION, POWDER, LYOPHILIZED, FOR SOLUTION INTRAMUSCULAR; INTRAVENOUS ONCE
Status: CANCELLED
Start: 2018-06-29 | End: 2018-06-29

## 2018-06-29 RX ADMIN — ERTAPENEM SODIUM 1 G: 1 INJECTION, POWDER, LYOPHILIZED, FOR SOLUTION INTRAMUSCULAR; INTRAVENOUS at 15:08

## 2018-06-29 RX ADMIN — LIDOCAINE HYDROCHLORIDE 3.2 ML: 10 INJECTION, SOLUTION EPIDURAL; INFILTRATION; INTRACAUDAL; PERINEURAL at 15:07

## 2018-06-30 ENCOUNTER — HOSPITAL ENCOUNTER (OUTPATIENT)
Dept: INFUSION THERAPY | Facility: HOSPITAL | Age: 78
Setting detail: INFUSION SERIES
Discharge: HOME | End: 2018-06-30

## 2018-06-30 VITALS
DIASTOLIC BLOOD PRESSURE: 64 MMHG | RESPIRATION RATE: 20 BRPM | SYSTOLIC BLOOD PRESSURE: 134 MMHG | TEMPERATURE: 98.4 F | HEART RATE: 60 BPM

## 2018-06-30 DIAGNOSIS — N39.0 URINARY TRACT INFECTION WITHOUT HEMATURIA, SITE UNSPECIFIED: ICD-10-CM

## 2018-06-30 PROCEDURE — 96372 THER/PROPH/DIAG INJ SC/IM: CPT

## 2018-06-30 PROCEDURE — 25010000002 ERTAPENEM PER 500 MG: Performed by: NURSE PRACTITIONER

## 2018-06-30 RX ORDER — LIDOCAINE HYDROCHLORIDE 10 MG/ML
5 INJECTION, SOLUTION EPIDURAL; INFILTRATION; INTRACAUDAL; PERINEURAL ONCE
Status: CANCELLED
Start: 2018-06-30 | End: 2018-06-30

## 2018-06-30 RX ORDER — ERTAPENEM 1 G/1
1 INJECTION, POWDER, LYOPHILIZED, FOR SOLUTION INTRAMUSCULAR; INTRAVENOUS ONCE
Status: CANCELLED
Start: 2018-06-30 | End: 2018-06-30

## 2018-06-30 RX ORDER — ERTAPENEM 1 G/1
1 INJECTION, POWDER, LYOPHILIZED, FOR SOLUTION INTRAMUSCULAR; INTRAVENOUS ONCE
Status: COMPLETED | OUTPATIENT
Start: 2018-06-30 | End: 2018-06-30

## 2018-06-30 RX ORDER — ERTAPENEM 1 G/1
INJECTION, POWDER, LYOPHILIZED, FOR SOLUTION INTRAMUSCULAR; INTRAVENOUS
Status: DISCONTINUED
Start: 2018-06-30 | End: 2018-07-02 | Stop reason: HOSPADM

## 2018-06-30 RX ORDER — LIDOCAINE HYDROCHLORIDE 10 MG/ML
5 INJECTION, SOLUTION EPIDURAL; INFILTRATION; INTRACAUDAL; PERINEURAL ONCE
Status: COMPLETED | OUTPATIENT
Start: 2018-06-30 | End: 2018-06-30

## 2018-06-30 RX ADMIN — LIDOCAINE HYDROCHLORIDE 3.2 ML: 10 INJECTION, SOLUTION EPIDURAL; INFILTRATION; INTRACAUDAL; PERINEURAL at 10:12

## 2018-06-30 RX ADMIN — ERTAPENEM SODIUM 1 G: 1 INJECTION, POWDER, LYOPHILIZED, FOR SOLUTION INTRAMUSCULAR; INTRAVENOUS at 10:12

## 2018-10-04 ENCOUNTER — APPOINTMENT (OUTPATIENT)
Dept: MAMMOGRAPHY | Facility: HOSPITAL | Age: 78
End: 2018-10-04

## 2018-10-04 ENCOUNTER — HOSPITAL ENCOUNTER (OUTPATIENT)
Dept: MAMMOGRAPHY | Facility: HOSPITAL | Age: 78
Discharge: HOME | End: 2018-10-04
Admitting: INTERNAL MEDICINE

## 2018-10-04 DIAGNOSIS — Z12.39 SCREENING BREAST EXAMINATION: ICD-10-CM

## 2018-10-04 PROCEDURE — 77063 BREAST TOMOSYNTHESIS BI: CPT | Performed by: RADIOLOGY

## 2018-10-04 PROCEDURE — 77067 SCR MAMMO BI INCL CAD: CPT | Performed by: RADIOLOGY

## 2018-10-04 PROCEDURE — 77063 BREAST TOMOSYNTHESIS BI: CPT

## 2018-10-04 PROCEDURE — 77067 SCR MAMMO BI INCL CAD: CPT

## 2019-01-21 ENCOUNTER — HOSPITAL ENCOUNTER (OUTPATIENT)
Dept: GENERAL RADIOLOGY | Facility: HOSPITAL | Age: 79
Discharge: HOME | End: 2019-01-21
Attending: INTERNAL MEDICINE | Admitting: INTERNAL MEDICINE

## 2019-01-21 ENCOUNTER — TRANSCRIBE ORDERS (OUTPATIENT)
Dept: ADMINISTRATIVE | Facility: HOSPITAL | Age: 79
End: 2019-01-21

## 2019-01-21 DIAGNOSIS — M79.604 RIGHT LEG PAIN: Primary | ICD-10-CM

## 2019-01-21 DIAGNOSIS — M79.604 RIGHT LEG PAIN: ICD-10-CM

## 2019-01-21 PROCEDURE — 73552 X-RAY EXAM OF FEMUR 2/>: CPT | Performed by: RADIOLOGY

## 2019-01-21 PROCEDURE — 73590 X-RAY EXAM OF LOWER LEG: CPT | Performed by: RADIOLOGY

## 2019-01-21 PROCEDURE — 73564 X-RAY EXAM KNEE 4 OR MORE: CPT | Performed by: RADIOLOGY

## 2019-01-21 PROCEDURE — 73552 X-RAY EXAM OF FEMUR 2/>: CPT

## 2019-01-21 PROCEDURE — 73590 X-RAY EXAM OF LOWER LEG: CPT

## 2019-01-21 PROCEDURE — 73564 X-RAY EXAM KNEE 4 OR MORE: CPT

## 2019-03-20 ENCOUNTER — HOSPITAL ENCOUNTER (OUTPATIENT)
Dept: GENERAL RADIOLOGY | Facility: HOSPITAL | Age: 79
Discharge: HOME | End: 2019-03-20

## 2019-03-20 ENCOUNTER — TRANSCRIBE ORDERS (OUTPATIENT)
Dept: ADMINISTRATIVE | Facility: HOSPITAL | Age: 79
End: 2019-03-20

## 2019-03-20 ENCOUNTER — HOSPITAL ENCOUNTER (OUTPATIENT)
Dept: GENERAL RADIOLOGY | Facility: HOSPITAL | Age: 79
Discharge: HOME | End: 2019-03-20
Admitting: INTERNAL MEDICINE

## 2019-03-20 DIAGNOSIS — M54.6 THORACIC SPINE PAIN: ICD-10-CM

## 2019-03-20 DIAGNOSIS — M54.6 THORACIC SPINE PAIN: Primary | ICD-10-CM

## 2019-03-20 DIAGNOSIS — M54.5 LOW BACK PAIN, UNSPECIFIED BACK PAIN LATERALITY, UNSPECIFIED CHRONICITY, WITH SCIATICA PRESENCE UNSPECIFIED: ICD-10-CM

## 2019-03-20 PROCEDURE — 72072 X-RAY EXAM THORAC SPINE 3VWS: CPT | Performed by: RADIOLOGY

## 2019-03-20 PROCEDURE — 72110 X-RAY EXAM L-2 SPINE 4/>VWS: CPT

## 2019-03-20 PROCEDURE — 72072 X-RAY EXAM THORAC SPINE 3VWS: CPT

## 2019-03-20 PROCEDURE — 72110 X-RAY EXAM L-2 SPINE 4/>VWS: CPT | Performed by: RADIOLOGY

## 2019-06-25 ENCOUNTER — TRANSCRIBE ORDERS (OUTPATIENT)
Dept: ADMINISTRATIVE | Facility: HOSPITAL | Age: 79
End: 2019-06-25

## 2019-06-25 ENCOUNTER — HOSPITAL ENCOUNTER (OUTPATIENT)
Dept: CARDIOLOGY | Facility: HOSPITAL | Age: 79
Discharge: HOME | End: 2019-06-25
Admitting: INTERNAL MEDICINE

## 2019-06-25 DIAGNOSIS — R60.9 EDEMA, UNSPECIFIED TYPE: ICD-10-CM

## 2019-06-25 DIAGNOSIS — M79.604 RIGHT LEG PAIN: Primary | ICD-10-CM

## 2019-06-25 DIAGNOSIS — M79.604 RIGHT LEG PAIN: ICD-10-CM

## 2019-06-25 PROCEDURE — 93971 EXTREMITY STUDY: CPT

## 2019-06-25 PROCEDURE — 93971 EXTREMITY STUDY: CPT | Performed by: RADIOLOGY

## 2019-10-07 ENCOUNTER — HOSPITAL ENCOUNTER (OUTPATIENT)
Dept: MAMMOGRAPHY | Facility: HOSPITAL | Age: 79
Discharge: HOME | End: 2019-10-07
Admitting: INTERNAL MEDICINE

## 2019-10-07 DIAGNOSIS — Z12.31 VISIT FOR SCREENING MAMMOGRAM: ICD-10-CM

## 2019-10-07 PROCEDURE — 77067 SCR MAMMO BI INCL CAD: CPT

## 2019-10-07 PROCEDURE — 77067 SCR MAMMO BI INCL CAD: CPT | Performed by: RADIOLOGY

## 2019-10-07 PROCEDURE — 77063 BREAST TOMOSYNTHESIS BI: CPT

## 2019-10-07 PROCEDURE — 77063 BREAST TOMOSYNTHESIS BI: CPT | Performed by: RADIOLOGY

## 2020-02-03 ENCOUNTER — HOSPITAL ENCOUNTER (OUTPATIENT)
Dept: BONE DENSITY | Facility: HOSPITAL | Age: 80
Discharge: HOME | End: 2020-02-03
Admitting: INTERNAL MEDICINE

## 2020-02-03 DIAGNOSIS — M81.0 OSTEOPOROSIS, POST-MENOPAUSAL: ICD-10-CM

## 2020-02-03 PROCEDURE — 77080 DXA BONE DENSITY AXIAL: CPT | Performed by: RADIOLOGY

## 2020-02-03 PROCEDURE — 77080 DXA BONE DENSITY AXIAL: CPT

## 2020-06-15 ENCOUNTER — LAB (OUTPATIENT)
Dept: LAB | Facility: HOSPITAL | Age: 80
End: 2020-06-15

## 2020-06-15 DIAGNOSIS — Z20.828 EXPOSURE TO SARS-ASSOCIATED CORONAVIRUS: Primary | ICD-10-CM

## 2020-06-15 DIAGNOSIS — Z20.828 EXPOSURE TO SARS-ASSOCIATED CORONAVIRUS: ICD-10-CM

## 2020-06-15 LAB — SARS-COV-2 RNA RESP QL NAA+PROBE: DETECTED

## 2020-06-15 PROCEDURE — C9803 HOPD COVID-19 SPEC COLLECT: HCPCS

## 2020-06-15 PROCEDURE — 87635 SARS-COV-2 COVID-19 AMP PRB: CPT

## 2020-06-22 ENCOUNTER — APPOINTMENT (OUTPATIENT)
Dept: CT IMAGING | Facility: HOSPITAL | Age: 80
End: 2020-06-22

## 2020-06-22 ENCOUNTER — HOSPITAL ENCOUNTER (INPATIENT)
Facility: HOSPITAL | Age: 80
LOS: 3 days | Discharge: HOME | End: 2020-06-25
Attending: EMERGENCY MEDICINE | Admitting: INTERNAL MEDICINE

## 2020-06-22 ENCOUNTER — APPOINTMENT (OUTPATIENT)
Dept: GENERAL RADIOLOGY | Facility: HOSPITAL | Age: 80
End: 2020-06-22

## 2020-06-22 DIAGNOSIS — U07.1 ACUTE RESPIRATORY DISEASE DUE TO COVID-19 VIRUS: Primary | ICD-10-CM

## 2020-06-22 DIAGNOSIS — E87.1 HYPONATREMIA: ICD-10-CM

## 2020-06-22 DIAGNOSIS — J06.9 ACUTE RESPIRATORY DISEASE DUE TO COVID-19 VIRUS: Primary | ICD-10-CM

## 2020-06-22 LAB
A-A DO2: 40.9 MMHG (ref 0–300)
ALBUMIN SERPL-MCNC: 3.8 G/DL (ref 3.5–5.2)
ALBUMIN/GLOB SERPL: 1.1 G/DL
ALP SERPL-CCNC: 129 U/L (ref 39–117)
ALT SERPL W P-5'-P-CCNC: 16 U/L (ref 1–33)
ANION GAP SERPL CALCULATED.3IONS-SCNC: 12 MMOL/L (ref 5–15)
APTT PPP: 38.6 SECONDS (ref 25.6–35.3)
ARTERIAL PATENCY WRIST A: ABNORMAL
AST SERPL-CCNC: 26 U/L (ref 1–32)
ATMOSPHERIC PRESS: 727 MMHG
BASE EXCESS BLDA CALC-SCNC: 0.7 MMOL/L (ref 0–2)
BASOPHILS # BLD AUTO: 0.02 10*3/MM3 (ref 0–0.2)
BASOPHILS NFR BLD AUTO: 0.3 % (ref 0–1.5)
BDY SITE: ABNORMAL
BILIRUB SERPL-MCNC: 1.2 MG/DL (ref 0.2–1.2)
BODY TEMPERATURE: 0 C
BUN BLD-MCNC: 27 MG/DL (ref 8–23)
BUN/CREAT SERPL: 20.5 (ref 7–25)
CALCIUM SPEC-SCNC: 9.8 MG/DL (ref 8.6–10.5)
CHLORIDE SERPL-SCNC: 94 MMOL/L (ref 98–107)
CO2 BLDA-SCNC: 24.3 MMOL/L (ref 22–33)
CO2 SERPL-SCNC: 23 MMOL/L (ref 22–29)
COHGB MFR BLD: 0.7 % (ref 0–5)
CREAT BLD-MCNC: 1.32 MG/DL (ref 0.57–1)
CRP SERPL-MCNC: 4.62 MG/DL (ref 0–0.5)
D-LACTATE SERPL-SCNC: 1.8 MMOL/L (ref 0.5–2)
DEPRECATED RDW RBC AUTO: 41.1 FL (ref 37–54)
EOSINOPHIL # BLD AUTO: 0 10*3/MM3 (ref 0–0.4)
EOSINOPHIL NFR BLD AUTO: 0 % (ref 0.3–6.2)
ERYTHROCYTE [DISTWIDTH] IN BLOOD BY AUTOMATED COUNT: 13 % (ref 12.3–15.4)
FERRITIN SERPL-MCNC: 487.2 NG/ML (ref 13–150)
GFR SERPL CREATININE-BSD FRML MDRD: 39 ML/MIN/1.73
GLOBULIN UR ELPH-MCNC: 3.4 GM/DL
GLUCOSE BLD-MCNC: 249 MG/DL (ref 65–99)
GLUCOSE BLDC GLUCOMTR-MCNC: 152 MG/DL (ref 70–130)
HCO3 BLDA-SCNC: 23.4 MMOL/L (ref 20–26)
HCT VFR BLD AUTO: 41.3 % (ref 34–46.6)
HCT VFR BLD CALC: 42.2 % (ref 38–51)
HGB BLD-MCNC: 13.6 G/DL (ref 12–15.9)
HGB BLDA-MCNC: 13.8 G/DL (ref 13.5–17.5)
HOLD SPECIMEN: NORMAL
HOLD SPECIMEN: NORMAL
HOROWITZ INDEX BLD+IHG-RTO: 21 %
IMM GRANULOCYTES # BLD AUTO: 0.05 10*3/MM3 (ref 0–0.05)
IMM GRANULOCYTES NFR BLD AUTO: 0.7 % (ref 0–0.5)
INR PPP: 1.45 (ref 0.9–1.1)
LDH SERPL-CCNC: 228 U/L (ref 135–214)
LIPASE SERPL-CCNC: 41 U/L (ref 13–60)
LYMPHOCYTES # BLD AUTO: 1.38 10*3/MM3 (ref 0.7–3.1)
LYMPHOCYTES NFR BLD AUTO: 19.8 % (ref 19.6–45.3)
Lab: ABNORMAL
MAGNESIUM SERPL-MCNC: 1.7 MG/DL (ref 1.6–2.4)
MCH RBC QN AUTO: 28.7 PG (ref 26.6–33)
MCHC RBC AUTO-ENTMCNC: 32.9 G/DL (ref 31.5–35.7)
MCV RBC AUTO: 87.1 FL (ref 79–97)
METHGB BLD QL: 0.1 % (ref 0–3)
MODALITY: ABNORMAL
MONOCYTES # BLD AUTO: 0.64 10*3/MM3 (ref 0.1–0.9)
MONOCYTES NFR BLD AUTO: 9.2 % (ref 5–12)
NEUTROPHILS # BLD AUTO: 4.88 10*3/MM3 (ref 1.7–7)
NEUTROPHILS NFR BLD AUTO: 70 % (ref 42.7–76)
NOTE: ABNORMAL
NRBC BLD AUTO-RTO: 0 /100 WBC (ref 0–0.2)
NT-PROBNP SERPL-MCNC: 3864 PG/ML (ref 5–1800)
OXYHGB MFR BLDV: 93.8 % (ref 94–99)
PCO2 BLDA: 31.1 MM HG (ref 35–45)
PCO2 TEMP ADJ BLD: ABNORMAL MM[HG]
PH BLDA: 7.49 PH UNITS (ref 7.35–7.45)
PH, TEMP CORRECTED: ABNORMAL
PLATELET # BLD AUTO: 202 10*3/MM3 (ref 140–450)
PMV BLD AUTO: 11.8 FL (ref 6–12)
PO2 BLDA: 66.8 MM HG (ref 83–108)
PO2 TEMP ADJ BLD: ABNORMAL MM[HG]
POTASSIUM BLD-SCNC: 3.8 MMOL/L (ref 3.5–5.2)
PROT SERPL-MCNC: 7.2 G/DL (ref 6–8.5)
PROTHROMBIN TIME: 17.4 SECONDS (ref 11.9–14.1)
RBC # BLD AUTO: 4.74 10*6/MM3 (ref 3.77–5.28)
SAO2 % BLDCOA: 94.6 % (ref 94–99)
SODIUM BLD-SCNC: 129 MMOL/L (ref 136–145)
TROPONIN T SERPL-MCNC: <0.01 NG/ML (ref 0–0.03)
VENTILATOR MODE: ABNORMAL
WBC NRBC COR # BLD: 6.97 10*3/MM3 (ref 3.4–10.8)
WHOLE BLOOD HOLD SPECIMEN: NORMAL
WHOLE BLOOD HOLD SPECIMEN: NORMAL

## 2020-06-22 PROCEDURE — 83735 ASSAY OF MAGNESIUM: CPT | Performed by: PHYSICIAN ASSISTANT

## 2020-06-22 PROCEDURE — 84484 ASSAY OF TROPONIN QUANT: CPT | Performed by: PHYSICIAN ASSISTANT

## 2020-06-22 PROCEDURE — 83050 HGB METHEMOGLOBIN QUAN: CPT

## 2020-06-22 PROCEDURE — 86140 C-REACTIVE PROTEIN: CPT | Performed by: PHYSICIAN ASSISTANT

## 2020-06-22 PROCEDURE — 84145 PROCALCITONIN (PCT): CPT | Performed by: PHYSICIAN ASSISTANT

## 2020-06-22 PROCEDURE — 71250 CT THORAX DX C-: CPT

## 2020-06-22 PROCEDURE — 83605 ASSAY OF LACTIC ACID: CPT | Performed by: PHYSICIAN ASSISTANT

## 2020-06-22 PROCEDURE — 83880 ASSAY OF NATRIURETIC PEPTIDE: CPT | Performed by: PHYSICIAN ASSISTANT

## 2020-06-22 PROCEDURE — 99223 1ST HOSP IP/OBS HIGH 75: CPT | Performed by: INTERNAL MEDICINE

## 2020-06-22 PROCEDURE — 0 IOVERSOL 74 % SOLUTION: Performed by: FAMILY MEDICINE

## 2020-06-22 PROCEDURE — 71045 X-RAY EXAM CHEST 1 VIEW: CPT | Performed by: RADIOLOGY

## 2020-06-22 PROCEDURE — 99283 EMERGENCY DEPT VISIT LOW MDM: CPT

## 2020-06-22 PROCEDURE — 82728 ASSAY OF FERRITIN: CPT | Performed by: PHYSICIAN ASSISTANT

## 2020-06-22 PROCEDURE — 83615 LACTATE (LD) (LDH) ENZYME: CPT | Performed by: PHYSICIAN ASSISTANT

## 2020-06-22 PROCEDURE — 93010 ELECTROCARDIOGRAM REPORT: CPT | Performed by: INTERNAL MEDICINE

## 2020-06-22 PROCEDURE — 71250 CT THORAX DX C-: CPT | Performed by: RADIOLOGY

## 2020-06-22 PROCEDURE — 87040 BLOOD CULTURE FOR BACTERIA: CPT | Performed by: PHYSICIAN ASSISTANT

## 2020-06-22 PROCEDURE — 63710000001 INSULIN ASPART PER 5 UNITS: Performed by: INTERNAL MEDICINE

## 2020-06-22 PROCEDURE — 25010000002 ENOXAPARIN PER 10 MG: Performed by: INTERNAL MEDICINE

## 2020-06-22 PROCEDURE — 82962 GLUCOSE BLOOD TEST: CPT

## 2020-06-22 PROCEDURE — 80053 COMPREHEN METABOLIC PANEL: CPT | Performed by: PHYSICIAN ASSISTANT

## 2020-06-22 PROCEDURE — 93005 ELECTROCARDIOGRAM TRACING: CPT | Performed by: PHYSICIAN ASSISTANT

## 2020-06-22 PROCEDURE — 83690 ASSAY OF LIPASE: CPT | Performed by: PHYSICIAN ASSISTANT

## 2020-06-22 PROCEDURE — 71045 X-RAY EXAM CHEST 1 VIEW: CPT

## 2020-06-22 PROCEDURE — 25010000003 MAGNESIUM SULFATE 4 GM/100ML SOLUTION: Performed by: INTERNAL MEDICINE

## 2020-06-22 PROCEDURE — 85730 THROMBOPLASTIN TIME PARTIAL: CPT | Performed by: PHYSICIAN ASSISTANT

## 2020-06-22 PROCEDURE — 82805 BLOOD GASES W/O2 SATURATION: CPT

## 2020-06-22 PROCEDURE — 36600 WITHDRAWAL OF ARTERIAL BLOOD: CPT

## 2020-06-22 PROCEDURE — 85025 COMPLETE CBC W/AUTO DIFF WBC: CPT | Performed by: PHYSICIAN ASSISTANT

## 2020-06-22 PROCEDURE — 82375 ASSAY CARBOXYHB QUANT: CPT

## 2020-06-22 PROCEDURE — 25010000002 CEFTRIAXONE PER 250 MG: Performed by: INTERNAL MEDICINE

## 2020-06-22 PROCEDURE — 85610 PROTHROMBIN TIME: CPT | Performed by: PHYSICIAN ASSISTANT

## 2020-06-22 RX ORDER — NICOTINE POLACRILEX 4 MG
15 LOZENGE BUCCAL
Status: DISCONTINUED | OUTPATIENT
Start: 2020-06-22 | End: 2020-06-25 | Stop reason: HOSPADM

## 2020-06-22 RX ORDER — POTASSIUM CHLORIDE 1.5 G/1.77G
40 POWDER, FOR SOLUTION ORAL AS NEEDED
Status: DISCONTINUED | OUTPATIENT
Start: 2020-06-22 | End: 2020-06-25 | Stop reason: HOSPADM

## 2020-06-22 RX ORDER — SODIUM CHLORIDE 0.9 % (FLUSH) 0.9 %
10 SYRINGE (ML) INJECTION AS NEEDED
Status: DISCONTINUED | OUTPATIENT
Start: 2020-06-22 | End: 2020-06-25 | Stop reason: HOSPADM

## 2020-06-22 RX ORDER — MAGNESIUM SULFATE HEPTAHYDRATE 40 MG/ML
4 INJECTION, SOLUTION INTRAVENOUS AS NEEDED
Status: DISCONTINUED | OUTPATIENT
Start: 2020-06-22 | End: 2020-06-25 | Stop reason: HOSPADM

## 2020-06-22 RX ORDER — SODIUM CHLORIDE 0.9 % (FLUSH) 0.9 %
10 SYRINGE (ML) INJECTION EVERY 12 HOURS SCHEDULED
Status: DISCONTINUED | OUTPATIENT
Start: 2020-06-23 | End: 2020-06-25 | Stop reason: HOSPADM

## 2020-06-22 RX ORDER — GLIPIZIDE 5 MG/1
5 TABLET ORAL
Status: CANCELLED | OUTPATIENT
Start: 2020-06-23

## 2020-06-22 RX ORDER — PIOGLITAZONEHYDROCHLORIDE 15 MG/1
15 TABLET ORAL DAILY
COMMUNITY
End: 2021-03-24

## 2020-06-22 RX ORDER — ACETAMINOPHEN 325 MG/1
650 TABLET ORAL EVERY 4 HOURS PRN
Status: DISCONTINUED | OUTPATIENT
Start: 2020-06-22 | End: 2020-06-25 | Stop reason: HOSPADM

## 2020-06-22 RX ORDER — MAGNESIUM SULFATE HEPTAHYDRATE 40 MG/ML
4 INJECTION, SOLUTION INTRAVENOUS ONCE
Status: COMPLETED | OUTPATIENT
Start: 2020-06-22 | End: 2020-06-23

## 2020-06-22 RX ORDER — POTASSIUM CHLORIDE 20 MEQ/1
40 TABLET, EXTENDED RELEASE ORAL AS NEEDED
Status: DISCONTINUED | OUTPATIENT
Start: 2020-06-22 | End: 2020-06-25 | Stop reason: HOSPADM

## 2020-06-22 RX ORDER — SODIUM CHLORIDE 0.9 % (FLUSH) 0.9 %
10 SYRINGE (ML) INJECTION EVERY 12 HOURS SCHEDULED
Status: DISCONTINUED | OUTPATIENT
Start: 2020-06-22 | End: 2020-06-25 | Stop reason: HOSPADM

## 2020-06-22 RX ORDER — DOCUSATE SODIUM 100 MG/1
100 CAPSULE, LIQUID FILLED ORAL 2 TIMES DAILY
Status: DISCONTINUED | OUTPATIENT
Start: 2020-06-22 | End: 2020-06-22

## 2020-06-22 RX ORDER — PIOGLITAZONEHYDROCHLORIDE 15 MG/1
15 TABLET ORAL DAILY
Status: CANCELLED | OUTPATIENT
Start: 2020-06-23

## 2020-06-22 RX ORDER — ACETAMINOPHEN 650 MG/1
650 SUPPOSITORY RECTAL EVERY 4 HOURS PRN
Status: DISCONTINUED | OUTPATIENT
Start: 2020-06-22 | End: 2020-06-25 | Stop reason: HOSPADM

## 2020-06-22 RX ORDER — CHOLECALCIFEROL (VITAMIN D3) 125 MCG
500 CAPSULE ORAL DAILY
COMMUNITY
End: 2021-03-24

## 2020-06-22 RX ORDER — LOSARTAN POTASSIUM 25 MG/1
100 TABLET ORAL DAILY
Status: CANCELLED | OUTPATIENT
Start: 2020-06-23

## 2020-06-22 RX ORDER — MAGNESIUM SULFATE HEPTAHYDRATE 40 MG/ML
2 INJECTION, SOLUTION INTRAVENOUS AS NEEDED
Status: DISCONTINUED | OUTPATIENT
Start: 2020-06-22 | End: 2020-06-25 | Stop reason: HOSPADM

## 2020-06-22 RX ORDER — FUROSEMIDE 40 MG/1
40 TABLET ORAL
COMMUNITY

## 2020-06-22 RX ORDER — DOXYCYCLINE 100 MG/1
100 CAPSULE ORAL EVERY 12 HOURS SCHEDULED
Status: DISCONTINUED | OUTPATIENT
Start: 2020-06-22 | End: 2020-06-25

## 2020-06-22 RX ORDER — POTASSIUM CHLORIDE 7.45 MG/ML
10 INJECTION INTRAVENOUS
Status: DISCONTINUED | OUTPATIENT
Start: 2020-06-22 | End: 2020-06-25 | Stop reason: HOSPADM

## 2020-06-22 RX ORDER — DEXAMETHASONE SODIUM PHOSPHATE 10 MG/ML
6 INJECTION INTRAMUSCULAR; INTRAVENOUS DAILY
Status: DISCONTINUED | OUTPATIENT
Start: 2020-06-23 | End: 2020-06-22

## 2020-06-22 RX ORDER — DEXTROSE MONOHYDRATE 25 G/50ML
25 INJECTION, SOLUTION INTRAVENOUS
Status: DISCONTINUED | OUTPATIENT
Start: 2020-06-22 | End: 2020-06-25 | Stop reason: HOSPADM

## 2020-06-22 RX ADMIN — INSULIN ASPART 2 UNITS: 100 INJECTION, SOLUTION INTRAVENOUS; SUBCUTANEOUS at 21:08

## 2020-06-22 RX ADMIN — MAGNESIUM SULFATE HEPTAHYDRATE 4 G: 40 INJECTION, SOLUTION INTRAVENOUS at 21:03

## 2020-06-22 RX ADMIN — CEFTRIAXONE 2 G: 2 INJECTION, POWDER, FOR SOLUTION INTRAMUSCULAR; INTRAVENOUS at 21:02

## 2020-06-22 RX ADMIN — SODIUM CHLORIDE, PRESERVATIVE FREE 10 ML: 5 INJECTION INTRAVENOUS at 21:03

## 2020-06-22 RX ADMIN — SODIUM CHLORIDE, PRESERVATIVE FREE 10 ML: 5 INJECTION INTRAVENOUS at 23:17

## 2020-06-22 RX ADMIN — DOXYCYCLINE 100 MG: 100 CAPSULE ORAL at 21:03

## 2020-06-22 RX ADMIN — ENOXAPARIN SODIUM 40 MG: 40 INJECTION SUBCUTANEOUS at 21:03

## 2020-06-23 ENCOUNTER — APPOINTMENT (OUTPATIENT)
Dept: ULTRASOUND IMAGING | Facility: HOSPITAL | Age: 80
End: 2020-06-23

## 2020-06-23 LAB
ALBUMIN SERPL-MCNC: 3.55 G/DL (ref 3.5–5.2)
ALBUMIN/GLOB SERPL: 1.1 G/DL
ALP SERPL-CCNC: 124 U/L (ref 39–117)
ALT SERPL W P-5'-P-CCNC: 14 U/L (ref 1–33)
ANION GAP SERPL CALCULATED.3IONS-SCNC: 14 MMOL/L (ref 5–15)
AST SERPL-CCNC: 23 U/L (ref 1–32)
BASOPHILS # BLD AUTO: 0.02 10*3/MM3 (ref 0–0.2)
BASOPHILS NFR BLD AUTO: 0.3 % (ref 0–1.5)
BILIRUB SERPL-MCNC: 0.8 MG/DL (ref 0.2–1.2)
BUN BLD-MCNC: 28 MG/DL (ref 8–23)
BUN/CREAT SERPL: 24.1 (ref 7–25)
CALCIUM SPEC-SCNC: 9.8 MG/DL (ref 8.6–10.5)
CHLORIDE SERPL-SCNC: 95 MMOL/L (ref 98–107)
CK SERPL-CCNC: 50 U/L (ref 20–180)
CO2 SERPL-SCNC: 22 MMOL/L (ref 22–29)
CREAT BLD-MCNC: 1.16 MG/DL (ref 0.57–1)
CRP SERPL-MCNC: 4.51 MG/DL (ref 0–0.5)
D DIMER PPP FEU-MCNC: 0.65 MCGFEU/ML (ref 0–0.5)
DEPRECATED RDW RBC AUTO: 41 FL (ref 37–54)
EOSINOPHIL # BLD AUTO: 0 10*3/MM3 (ref 0–0.4)
EOSINOPHIL NFR BLD AUTO: 0 % (ref 0.3–6.2)
ERYTHROCYTE [DISTWIDTH] IN BLOOD BY AUTOMATED COUNT: 12.9 % (ref 12.3–15.4)
FIBRINOGEN PPP-MCNC: 536 MG/DL (ref 173–524)
GFR SERPL CREATININE-BSD FRML MDRD: 45 ML/MIN/1.73
GLOBULIN UR ELPH-MCNC: 3.3 GM/DL
GLUCOSE BLD-MCNC: 222 MG/DL (ref 65–99)
GLUCOSE BLDC GLUCOMTR-MCNC: 140 MG/DL (ref 70–130)
GLUCOSE BLDC GLUCOMTR-MCNC: 207 MG/DL (ref 70–130)
GLUCOSE BLDC GLUCOMTR-MCNC: 209 MG/DL (ref 70–130)
GLUCOSE BLDC GLUCOMTR-MCNC: 266 MG/DL (ref 70–130)
HCT VFR BLD AUTO: 40.1 % (ref 34–46.6)
HGB BLD-MCNC: 12.8 G/DL (ref 12–15.9)
IMM GRANULOCYTES # BLD AUTO: 0.06 10*3/MM3 (ref 0–0.05)
IMM GRANULOCYTES NFR BLD AUTO: 0.8 % (ref 0–0.5)
LYMPHOCYTES # BLD AUTO: 1.71 10*3/MM3 (ref 0.7–3.1)
LYMPHOCYTES NFR BLD AUTO: 24.2 % (ref 19.6–45.3)
MAGNESIUM SERPL-MCNC: 2.8 MG/DL (ref 1.6–2.4)
MCH RBC QN AUTO: 27.9 PG (ref 26.6–33)
MCHC RBC AUTO-ENTMCNC: 31.9 G/DL (ref 31.5–35.7)
MCV RBC AUTO: 87.6 FL (ref 79–97)
MONOCYTES # BLD AUTO: 0.59 10*3/MM3 (ref 0.1–0.9)
MONOCYTES NFR BLD AUTO: 8.3 % (ref 5–12)
NEUTROPHILS # BLD AUTO: 4.7 10*3/MM3 (ref 1.7–7)
NEUTROPHILS NFR BLD AUTO: 66.4 % (ref 42.7–76)
NRBC BLD AUTO-RTO: 0 /100 WBC (ref 0–0.2)
PHOSPHATE SERPL-MCNC: 2.4 MG/DL (ref 2.5–4.5)
PLATELET # BLD AUTO: 201 10*3/MM3 (ref 140–450)
PMV BLD AUTO: 11.6 FL (ref 6–12)
POTASSIUM BLD-SCNC: 3.4 MMOL/L (ref 3.5–5.2)
POTASSIUM BLD-SCNC: 4.3 MMOL/L (ref 3.5–5.2)
PROCALCITONIN SERPL-MCNC: 0.07 NG/ML (ref 0.1–0.25)
PROT SERPL-MCNC: 6.8 G/DL (ref 6–8.5)
RBC # BLD AUTO: 4.58 10*6/MM3 (ref 3.77–5.28)
SODIUM BLD-SCNC: 131 MMOL/L (ref 136–145)
TRIGL SERPL-MCNC: 122 MG/DL (ref 0–150)
WBC NRBC COR # BLD: 7.08 10*3/MM3 (ref 3.4–10.8)

## 2020-06-23 PROCEDURE — 25010000002 CEFTRIAXONE PER 250 MG: Performed by: INTERNAL MEDICINE

## 2020-06-23 PROCEDURE — 85025 COMPLETE CBC W/AUTO DIFF WBC: CPT | Performed by: INTERNAL MEDICINE

## 2020-06-23 PROCEDURE — 93970 EXTREMITY STUDY: CPT | Performed by: RADIOLOGY

## 2020-06-23 PROCEDURE — 99232 SBSQ HOSP IP/OBS MODERATE 35: CPT | Performed by: INTERNAL MEDICINE

## 2020-06-23 PROCEDURE — 84478 ASSAY OF TRIGLYCERIDES: CPT | Performed by: INTERNAL MEDICINE

## 2020-06-23 PROCEDURE — 94799 UNLISTED PULMONARY SVC/PX: CPT

## 2020-06-23 PROCEDURE — 25010000002 ENOXAPARIN PER 10 MG: Performed by: INTERNAL MEDICINE

## 2020-06-23 PROCEDURE — 82962 GLUCOSE BLOOD TEST: CPT

## 2020-06-23 PROCEDURE — 80053 COMPREHEN METABOLIC PANEL: CPT | Performed by: INTERNAL MEDICINE

## 2020-06-23 PROCEDURE — 85379 FIBRIN DEGRADATION QUANT: CPT | Performed by: INTERNAL MEDICINE

## 2020-06-23 PROCEDURE — 86140 C-REACTIVE PROTEIN: CPT | Performed by: INTERNAL MEDICINE

## 2020-06-23 PROCEDURE — 84132 ASSAY OF SERUM POTASSIUM: CPT | Performed by: INTERNAL MEDICINE

## 2020-06-23 PROCEDURE — 63710000001 INSULIN ASPART PER 5 UNITS: Performed by: INTERNAL MEDICINE

## 2020-06-23 PROCEDURE — 85384 FIBRINOGEN ACTIVITY: CPT | Performed by: INTERNAL MEDICINE

## 2020-06-23 PROCEDURE — 63710000001 INSULIN DETEMIR PER 5 UNITS: Performed by: INTERNAL MEDICINE

## 2020-06-23 PROCEDURE — 84100 ASSAY OF PHOSPHORUS: CPT | Performed by: INTERNAL MEDICINE

## 2020-06-23 PROCEDURE — 82550 ASSAY OF CK (CPK): CPT | Performed by: INTERNAL MEDICINE

## 2020-06-23 PROCEDURE — 83735 ASSAY OF MAGNESIUM: CPT | Performed by: INTERNAL MEDICINE

## 2020-06-23 PROCEDURE — 93970 EXTREMITY STUDY: CPT

## 2020-06-23 RX ORDER — AMLODIPINE BESYLATE 5 MG/1
5 TABLET ORAL
Status: DISCONTINUED | OUTPATIENT
Start: 2020-06-23 | End: 2020-06-25 | Stop reason: HOSPADM

## 2020-06-23 RX ORDER — LANOLIN ALCOHOL/MO/W.PET/CERES
500 CREAM (GRAM) TOPICAL DAILY
Status: DISCONTINUED | OUTPATIENT
Start: 2020-06-23 | End: 2020-06-25 | Stop reason: HOSPADM

## 2020-06-23 RX ORDER — FERROUS SULFATE 325(65) MG
325 TABLET ORAL DAILY
Status: DISCONTINUED | OUTPATIENT
Start: 2020-06-23 | End: 2020-06-25 | Stop reason: HOSPADM

## 2020-06-23 RX ORDER — ASPIRIN 81 MG/1
81 TABLET, CHEWABLE ORAL DAILY
Status: DISCONTINUED | OUTPATIENT
Start: 2020-06-23 | End: 2020-06-25 | Stop reason: HOSPADM

## 2020-06-23 RX ORDER — ATORVASTATIN CALCIUM 10 MG/1
10 TABLET, FILM COATED ORAL NIGHTLY
Status: DISCONTINUED | OUTPATIENT
Start: 2020-06-23 | End: 2020-06-25 | Stop reason: HOSPADM

## 2020-06-23 RX ORDER — POTASSIUM CHLORIDE 20 MEQ/1
40 TABLET, EXTENDED RELEASE ORAL EVERY 4 HOURS
Status: COMPLETED | OUTPATIENT
Start: 2020-06-23 | End: 2020-06-23

## 2020-06-23 RX ORDER — FUROSEMIDE 40 MG/1
40 TABLET ORAL
Status: CANCELLED | OUTPATIENT
Start: 2020-06-23

## 2020-06-23 RX ADMIN — SODIUM CHLORIDE, PRESERVATIVE FREE 10 ML: 5 INJECTION INTRAVENOUS at 08:25

## 2020-06-23 RX ADMIN — ATORVASTATIN CALCIUM 10 MG: 10 TABLET, FILM COATED ORAL at 19:55

## 2020-06-23 RX ADMIN — INSULIN DETEMIR 10 UNITS: 100 INJECTION, SOLUTION SUBCUTANEOUS at 19:57

## 2020-06-23 RX ADMIN — POTASSIUM & SODIUM PHOSPHATES POWDER PACK 280-160-250 MG 2 PACKET: 280-160-250 PACK at 12:04

## 2020-06-23 RX ADMIN — SODIUM CHLORIDE, PRESERVATIVE FREE 10 ML: 5 INJECTION INTRAVENOUS at 20:10

## 2020-06-23 RX ADMIN — ENOXAPARIN SODIUM 40 MG: 40 INJECTION SUBCUTANEOUS at 19:56

## 2020-06-23 RX ADMIN — DOXYCYCLINE 100 MG: 100 CAPSULE ORAL at 19:54

## 2020-06-23 RX ADMIN — POTASSIUM CHLORIDE 40 MEQ: 1500 TABLET, EXTENDED RELEASE ORAL at 03:34

## 2020-06-23 RX ADMIN — AMLODIPINE BESYLATE 5 MG: 5 TABLET ORAL at 19:55

## 2020-06-23 RX ADMIN — Medication 500 MCG: at 19:54

## 2020-06-23 RX ADMIN — DOXYCYCLINE 100 MG: 100 CAPSULE ORAL at 08:25

## 2020-06-23 RX ADMIN — FERROUS SULFATE TAB 325 MG (65 MG ELEMENTAL FE) 325 MG: 325 (65 FE) TAB at 19:55

## 2020-06-23 RX ADMIN — CEFTRIAXONE 2 G: 2 INJECTION, POWDER, FOR SOLUTION INTRAMUSCULAR; INTRAVENOUS at 19:56

## 2020-06-23 RX ADMIN — SODIUM CHLORIDE, PRESERVATIVE FREE 10 ML: 5 INJECTION INTRAVENOUS at 08:26

## 2020-06-23 RX ADMIN — INSULIN ASPART 3 UNITS: 100 INJECTION, SOLUTION INTRAVENOUS; SUBCUTANEOUS at 12:12

## 2020-06-23 RX ADMIN — ASPIRIN 81 MG: 81 TABLET, CHEWABLE ORAL at 19:55

## 2020-06-23 RX ADMIN — INSULIN ASPART 3 UNITS: 100 INJECTION, SOLUTION INTRAVENOUS; SUBCUTANEOUS at 16:48

## 2020-06-23 RX ADMIN — POTASSIUM CHLORIDE 40 MEQ: 1500 TABLET, EXTENDED RELEASE ORAL at 08:25

## 2020-06-24 ENCOUNTER — APPOINTMENT (OUTPATIENT)
Dept: CT IMAGING | Facility: HOSPITAL | Age: 80
End: 2020-06-24

## 2020-06-24 LAB
ALBUMIN SERPL-MCNC: 3.28 G/DL (ref 3.5–5.2)
ALBUMIN/GLOB SERPL: 1 G/DL
ALP SERPL-CCNC: 127 U/L (ref 39–117)
ALT SERPL W P-5'-P-CCNC: 15 U/L (ref 1–33)
ANION GAP SERPL CALCULATED.3IONS-SCNC: 9.3 MMOL/L (ref 5–15)
AST SERPL-CCNC: 20 U/L (ref 1–32)
BASOPHILS # BLD AUTO: 0.02 10*3/MM3 (ref 0–0.2)
BASOPHILS NFR BLD AUTO: 0.3 % (ref 0–1.5)
BILIRUB SERPL-MCNC: 0.6 MG/DL (ref 0.2–1.2)
BUN BLD-MCNC: 26 MG/DL (ref 8–23)
BUN/CREAT SERPL: 23 (ref 7–25)
CALCIUM SPEC-SCNC: 9.5 MG/DL (ref 8.6–10.5)
CHLORIDE SERPL-SCNC: 101 MMOL/L (ref 98–107)
CO2 SERPL-SCNC: 20.7 MMOL/L (ref 22–29)
CREAT BLD-MCNC: 1.13 MG/DL (ref 0.57–1)
CRP SERPL-MCNC: 3.39 MG/DL (ref 0–0.5)
DEPRECATED RDW RBC AUTO: 42.3 FL (ref 37–54)
EOSINOPHIL # BLD AUTO: 0.15 10*3/MM3 (ref 0–0.4)
EOSINOPHIL NFR BLD AUTO: 2.5 % (ref 0.3–6.2)
ERYTHROCYTE [DISTWIDTH] IN BLOOD BY AUTOMATED COUNT: 12.9 % (ref 12.3–15.4)
GFR SERPL CREATININE-BSD FRML MDRD: 46 ML/MIN/1.73
GLOBULIN UR ELPH-MCNC: 3.2 GM/DL
GLUCOSE BLD-MCNC: 147 MG/DL (ref 65–99)
GLUCOSE BLDC GLUCOMTR-MCNC: 119 MG/DL (ref 70–130)
GLUCOSE BLDC GLUCOMTR-MCNC: 204 MG/DL (ref 70–130)
GLUCOSE BLDC GLUCOMTR-MCNC: 308 MG/DL (ref 70–130)
HCT VFR BLD AUTO: 39.7 % (ref 34–46.6)
HGB BLD-MCNC: 12.4 G/DL (ref 12–15.9)
IMM GRANULOCYTES # BLD AUTO: 0.05 10*3/MM3 (ref 0–0.05)
IMM GRANULOCYTES NFR BLD AUTO: 0.8 % (ref 0–0.5)
LYMPHOCYTES # BLD AUTO: 1.94 10*3/MM3 (ref 0.7–3.1)
LYMPHOCYTES NFR BLD AUTO: 32.8 % (ref 19.6–45.3)
MAGNESIUM SERPL-MCNC: 2.3 MG/DL (ref 1.6–2.4)
MCH RBC QN AUTO: 27.9 PG (ref 26.6–33)
MCHC RBC AUTO-ENTMCNC: 31.2 G/DL (ref 31.5–35.7)
MCV RBC AUTO: 89.2 FL (ref 79–97)
MONOCYTES # BLD AUTO: 0.55 10*3/MM3 (ref 0.1–0.9)
MONOCYTES NFR BLD AUTO: 9.3 % (ref 5–12)
NEUTROPHILS # BLD AUTO: 3.2 10*3/MM3 (ref 1.7–7)
NEUTROPHILS NFR BLD AUTO: 54.3 % (ref 42.7–76)
NRBC BLD AUTO-RTO: 0 /100 WBC (ref 0–0.2)
PHOSPHATE SERPL-MCNC: 2.5 MG/DL (ref 2.5–4.5)
PLATELET # BLD AUTO: 245 10*3/MM3 (ref 140–450)
PMV BLD AUTO: 10.2 FL (ref 6–12)
POTASSIUM BLD-SCNC: 4.2 MMOL/L (ref 3.5–5.2)
PROT SERPL-MCNC: 6.5 G/DL (ref 6–8.5)
RBC # BLD AUTO: 4.45 10*6/MM3 (ref 3.77–5.28)
SODIUM BLD-SCNC: 131 MMOL/L (ref 136–145)
WBC NRBC COR # BLD: 5.91 10*3/MM3 (ref 3.4–10.8)

## 2020-06-24 PROCEDURE — 63710000001 INSULIN DETEMIR PER 5 UNITS: Performed by: INTERNAL MEDICINE

## 2020-06-24 PROCEDURE — 85025 COMPLETE CBC W/AUTO DIFF WBC: CPT | Performed by: INTERNAL MEDICINE

## 2020-06-24 PROCEDURE — 63710000001 INSULIN ASPART PER 5 UNITS: Performed by: INTERNAL MEDICINE

## 2020-06-24 PROCEDURE — 86140 C-REACTIVE PROTEIN: CPT | Performed by: INTERNAL MEDICINE

## 2020-06-24 PROCEDURE — 82962 GLUCOSE BLOOD TEST: CPT

## 2020-06-24 PROCEDURE — 84100 ASSAY OF PHOSPHORUS: CPT | Performed by: INTERNAL MEDICINE

## 2020-06-24 PROCEDURE — 71275 CT ANGIOGRAPHY CHEST: CPT | Performed by: RADIOLOGY

## 2020-06-24 PROCEDURE — 80053 COMPREHEN METABOLIC PANEL: CPT | Performed by: INTERNAL MEDICINE

## 2020-06-24 PROCEDURE — 0 IOVERSOL 74 % SOLUTION: Performed by: INTERNAL MEDICINE

## 2020-06-24 PROCEDURE — 25010000002 ENOXAPARIN PER 10 MG: Performed by: INTERNAL MEDICINE

## 2020-06-24 PROCEDURE — 99232 SBSQ HOSP IP/OBS MODERATE 35: CPT | Performed by: INTERNAL MEDICINE

## 2020-06-24 PROCEDURE — 71275 CT ANGIOGRAPHY CHEST: CPT

## 2020-06-24 PROCEDURE — 83735 ASSAY OF MAGNESIUM: CPT | Performed by: INTERNAL MEDICINE

## 2020-06-24 PROCEDURE — 94799 UNLISTED PULMONARY SVC/PX: CPT

## 2020-06-24 RX ORDER — ALUMINA, MAGNESIA, AND SIMETHICONE 2400; 2400; 240 MG/30ML; MG/30ML; MG/30ML
15 SUSPENSION ORAL EVERY 6 HOURS PRN
Status: DISCONTINUED | OUTPATIENT
Start: 2020-06-24 | End: 2020-06-25 | Stop reason: HOSPADM

## 2020-06-24 RX ADMIN — ALUMINUM HYDROXIDE, MAGNESIUM HYDROXIDE, AND DIMETHICONE 15 ML: 400; 400; 40 SUSPENSION ORAL at 15:36

## 2020-06-24 RX ADMIN — DOXYCYCLINE 100 MG: 100 CAPSULE ORAL at 21:40

## 2020-06-24 RX ADMIN — AMLODIPINE BESYLATE 5 MG: 5 TABLET ORAL at 08:11

## 2020-06-24 RX ADMIN — Medication 500 MCG: at 08:11

## 2020-06-24 RX ADMIN — INSULIN DETEMIR 10 UNITS: 100 INJECTION, SOLUTION SUBCUTANEOUS at 09:11

## 2020-06-24 RX ADMIN — SODIUM CHLORIDE, PRESERVATIVE FREE 10 ML: 5 INJECTION INTRAVENOUS at 20:43

## 2020-06-24 RX ADMIN — INSULIN ASPART 5 UNITS: 100 INJECTION, SOLUTION INTRAVENOUS; SUBCUTANEOUS at 10:49

## 2020-06-24 RX ADMIN — ASPIRIN 81 MG: 81 TABLET, CHEWABLE ORAL at 08:11

## 2020-06-24 RX ADMIN — IOVERSOL 60 ML: 741 INJECTION INTRA-ARTERIAL; INTRAVENOUS at 19:45

## 2020-06-24 RX ADMIN — SODIUM CHLORIDE, PRESERVATIVE FREE 10 ML: 5 INJECTION INTRAVENOUS at 20:42

## 2020-06-24 RX ADMIN — ATORVASTATIN CALCIUM 10 MG: 10 TABLET, FILM COATED ORAL at 20:42

## 2020-06-24 RX ADMIN — ENOXAPARIN SODIUM 40 MG: 40 INJECTION SUBCUTANEOUS at 20:41

## 2020-06-24 RX ADMIN — INSULIN ASPART 3 UNITS: 100 INJECTION, SOLUTION INTRAVENOUS; SUBCUTANEOUS at 16:56

## 2020-06-24 RX ADMIN — DOXYCYCLINE 100 MG: 100 CAPSULE ORAL at 08:12

## 2020-06-25 ENCOUNTER — READMISSION MANAGEMENT (OUTPATIENT)
Dept: CALL CENTER | Facility: HOSPITAL | Age: 80
End: 2020-06-25

## 2020-06-25 VITALS
DIASTOLIC BLOOD PRESSURE: 77 MMHG | WEIGHT: 216.93 LBS | HEIGHT: 68 IN | RESPIRATION RATE: 18 BRPM | OXYGEN SATURATION: 96 % | SYSTOLIC BLOOD PRESSURE: 144 MMHG | HEART RATE: 70 BPM | BODY MASS INDEX: 32.88 KG/M2 | TEMPERATURE: 97.6 F

## 2020-06-25 LAB
ALBUMIN SERPL-MCNC: 3.16 G/DL (ref 3.5–5.2)
ALBUMIN/GLOB SERPL: 1 G/DL
ALP SERPL-CCNC: 128 U/L (ref 39–117)
ALT SERPL W P-5'-P-CCNC: 11 U/L (ref 1–33)
ANION GAP SERPL CALCULATED.3IONS-SCNC: 10.5 MMOL/L (ref 5–15)
AST SERPL-CCNC: 17 U/L (ref 1–32)
BASOPHILS # BLD AUTO: 0.02 10*3/MM3 (ref 0–0.2)
BASOPHILS NFR BLD AUTO: 0.3 % (ref 0–1.5)
BILIRUB SERPL-MCNC: 0.5 MG/DL (ref 0.2–1.2)
BUN BLD-MCNC: 21 MG/DL (ref 8–23)
BUN/CREAT SERPL: 23.6 (ref 7–25)
CALCIUM SPEC-SCNC: 9.6 MG/DL (ref 8.6–10.5)
CHLORIDE SERPL-SCNC: 98 MMOL/L (ref 98–107)
CO2 SERPL-SCNC: 21.5 MMOL/L (ref 22–29)
CREAT BLD-MCNC: 0.89 MG/DL (ref 0.57–1)
CRP SERPL-MCNC: 2.67 MG/DL (ref 0–0.5)
DEPRECATED RDW RBC AUTO: 42.2 FL (ref 37–54)
EOSINOPHIL # BLD AUTO: 0 10*3/MM3 (ref 0–0.4)
EOSINOPHIL NFR BLD AUTO: 0 % (ref 0.3–6.2)
ERYTHROCYTE [DISTWIDTH] IN BLOOD BY AUTOMATED COUNT: 12.8 % (ref 12.3–15.4)
GFR SERPL CREATININE-BSD FRML MDRD: 61 ML/MIN/1.73
GLOBULIN UR ELPH-MCNC: 3.2 GM/DL
GLUCOSE BLD-MCNC: 186 MG/DL (ref 65–99)
GLUCOSE BLDC GLUCOMTR-MCNC: 159 MG/DL (ref 70–130)
GLUCOSE BLDC GLUCOMTR-MCNC: 261 MG/DL (ref 70–130)
HCT VFR BLD AUTO: 38.2 % (ref 34–46.6)
HGB BLD-MCNC: 12.2 G/DL (ref 12–15.9)
IMM GRANULOCYTES # BLD AUTO: 0.05 10*3/MM3 (ref 0–0.05)
IMM GRANULOCYTES NFR BLD AUTO: 0.8 % (ref 0–0.5)
LYMPHOCYTES # BLD AUTO: 1.74 10*3/MM3 (ref 0.7–3.1)
LYMPHOCYTES NFR BLD AUTO: 29 % (ref 19.6–45.3)
MCH RBC QN AUTO: 28.5 PG (ref 26.6–33)
MCHC RBC AUTO-ENTMCNC: 31.9 G/DL (ref 31.5–35.7)
MCV RBC AUTO: 89.3 FL (ref 79–97)
MONOCYTES # BLD AUTO: 0.61 10*3/MM3 (ref 0.1–0.9)
MONOCYTES NFR BLD AUTO: 10.2 % (ref 5–12)
NEUTROPHILS # BLD AUTO: 3.57 10*3/MM3 (ref 1.7–7)
NEUTROPHILS NFR BLD AUTO: 59.7 % (ref 42.7–76)
NRBC BLD AUTO-RTO: 0 /100 WBC (ref 0–0.2)
PLATELET # BLD AUTO: 265 10*3/MM3 (ref 140–450)
PMV BLD AUTO: 10.3 FL (ref 6–12)
POTASSIUM BLD-SCNC: 4.6 MMOL/L (ref 3.5–5.2)
PROT SERPL-MCNC: 6.4 G/DL (ref 6–8.5)
RBC # BLD AUTO: 4.28 10*6/MM3 (ref 3.77–5.28)
SODIUM BLD-SCNC: 130 MMOL/L (ref 136–145)
WBC NRBC COR # BLD: 5.99 10*3/MM3 (ref 3.4–10.8)

## 2020-06-25 PROCEDURE — 63710000001 INSULIN ASPART PER 5 UNITS: Performed by: INTERNAL MEDICINE

## 2020-06-25 PROCEDURE — 63710000001 INSULIN DETEMIR PER 5 UNITS: Performed by: INTERNAL MEDICINE

## 2020-06-25 PROCEDURE — 85025 COMPLETE CBC W/AUTO DIFF WBC: CPT | Performed by: INTERNAL MEDICINE

## 2020-06-25 PROCEDURE — 80053 COMPREHEN METABOLIC PANEL: CPT | Performed by: INTERNAL MEDICINE

## 2020-06-25 PROCEDURE — 86140 C-REACTIVE PROTEIN: CPT | Performed by: NURSE PRACTITIONER

## 2020-06-25 PROCEDURE — 99239 HOSP IP/OBS DSCHRG MGMT >30: CPT | Performed by: INTERNAL MEDICINE

## 2020-06-25 PROCEDURE — 82962 GLUCOSE BLOOD TEST: CPT

## 2020-06-25 RX ORDER — CEFDINIR 300 MG/1
300 CAPSULE ORAL EVERY 12 HOURS SCHEDULED
Qty: 10 CAPSULE | Refills: 0 | Status: SHIPPED | OUTPATIENT
Start: 2020-06-25 | End: 2020-06-30

## 2020-06-25 RX ORDER — DOXYCYCLINE 100 MG/1
100 CAPSULE ORAL EVERY 12 HOURS SCHEDULED
Status: DISCONTINUED | OUTPATIENT
Start: 2020-06-25 | End: 2020-06-25 | Stop reason: HOSPADM

## 2020-06-25 RX ORDER — CEFDINIR 300 MG/1
300 CAPSULE ORAL EVERY 12 HOURS SCHEDULED
Status: DISCONTINUED | OUTPATIENT
Start: 2020-06-25 | End: 2020-06-25 | Stop reason: HOSPADM

## 2020-06-25 RX ORDER — DOXYCYCLINE 100 MG/1
100 CAPSULE ORAL EVERY 12 HOURS SCHEDULED
Qty: 10 CAPSULE | Refills: 0 | Status: SHIPPED | OUTPATIENT
Start: 2020-06-25 | End: 2020-06-30

## 2020-06-25 RX ADMIN — Medication 500 MCG: at 08:08

## 2020-06-25 RX ADMIN — SODIUM CHLORIDE, PRESERVATIVE FREE 10 ML: 5 INJECTION INTRAVENOUS at 08:09

## 2020-06-25 RX ADMIN — INSULIN ASPART 4 UNITS: 100 INJECTION, SOLUTION INTRAVENOUS; SUBCUTANEOUS at 10:44

## 2020-06-25 RX ADMIN — FERROUS SULFATE TAB 325 MG (65 MG ELEMENTAL FE) 325 MG: 325 (65 FE) TAB at 06:20

## 2020-06-25 RX ADMIN — DOXYCYCLINE 100 MG: 100 CAPSULE ORAL at 08:08

## 2020-06-25 RX ADMIN — AMLODIPINE BESYLATE 5 MG: 5 TABLET ORAL at 08:08

## 2020-06-25 RX ADMIN — INSULIN DETEMIR 10 UNITS: 100 INJECTION, SOLUTION SUBCUTANEOUS at 08:56

## 2020-06-25 RX ADMIN — ASPIRIN 81 MG: 81 TABLET, CHEWABLE ORAL at 08:08

## 2020-06-25 RX ADMIN — INSULIN ASPART 2 UNITS: 100 INJECTION, SOLUTION INTRAVENOUS; SUBCUTANEOUS at 06:46

## 2020-06-26 ENCOUNTER — READMISSION MANAGEMENT (OUTPATIENT)
Dept: CALL CENTER | Facility: HOSPITAL | Age: 80
End: 2020-06-26

## 2020-06-26 NOTE — OUTREACH NOTE
COPD/PN Week 1 Survey      Responses   [facility] [place] patient discharged from?  [facility]   COVID-19 Test Status  Confirmed   Does the patient have one of the following disease processes/diagnoses(primary or secondary)?  COPD/Pneumonia   Is there a successful TCM telephone encounter documented?  No   Was the primary reason for admission:  Pneumonia   Week 1 attempt successful?  Yes   Call start time  0928   Call end time  0938   Discharge diagnosis  Acute respiratory disease due to COVID-19 virus, PNA   Is patient permission given to speak with other caregiver?  Yes   List who call center can speak with  kenneth Floyd   Meds reviewed with patient/caregiver?  Yes   Is the patient having any side effects they believe may be caused by any medication additions or changes?  No   Does the patient have all medications ordered at discharge?  Yes   Is the patient taking all medications as directed (includes completed medication regime)?  Yes   Does the patient have a primary care provider?   Yes   Does the patient have an appointment with their PCP or pulmonologist within 7 days of discharge?  Yes   Comments regarding PCP  PCP Dr De La Rosa. Appt 6/29/20 245pm telehealth   Has the patient kept scheduled appointments due by today?  N/A   Has home health visited the patient within 72 hours of discharge?  N/A   Pulse Ox monitoring  Intermittent   Pulse Ox device source  Patient   O2 Sat comments  patient reports O2 sat 93% this am on room air. Discussed deep breath exercises every hour today.    O2 Sat: education provided  Sat levels, Monitoring frequency, When to seek care   Psychosocial issues?  Yes   Psychosocial comments  Patient verbalizes being worried about her  who is still hospitalized with COVID19.    Did the patient receive a copy of their discharge instructions?  Yes   Nursing interventions  Reviewed instructions with patient   What is the patient's perception of their health status since discharge?  Improving  [Patient reports that she slept well last night. ]   Nursing Interventions  Nurse provided patient education   Is the patient/caregiver able to teach back the hierarchy of who to call/visit for symptoms/problems? PCP, Specialist, Home health nurse, Urgent Care, ED, 911  Yes   Additional teach back comments  Patient reports temp this morning 96.4. Patient has not checked blood sugar yet this am.    Is the patient/caregiver able to teach back signs and symptoms of worsening condition:  Fever/chills, Shortness of breath, Chest pain   Is the patient/caregiver able to teach back importance of completing antibiotic course of treatment?  Yes   Week 1 call completed?  Yes   Wrap up additional comments  Patient on home isolation.           Kiki Singh RN

## 2020-06-26 NOTE — OUTREACH NOTE
Prep Survey      Responses   [facility]?  [facility]   Is LACE score < 7 ?  No   Eligibility  Readm Mgmt   Discharge diagnosis  Acute respiratory disease due to COVID-19 virus, PNA   COVID-19 Test Status  Confirmed   Does the patient have one of the following disease processes/diagnoses(primary or secondary)?  COPD/Pneumonia   Does the patient have Home health ordered?  No   Is there a DME ordered?  No   Prep survey completed?  Yes          Radha Mandujano RN

## 2020-06-27 ENCOUNTER — READMISSION MANAGEMENT (OUTPATIENT)
Dept: CALL CENTER | Facility: HOSPITAL | Age: 80
End: 2020-06-27

## 2020-06-27 LAB
BACTERIA SPEC AEROBE CULT: NORMAL
BACTERIA SPEC AEROBE CULT: NORMAL

## 2020-06-27 NOTE — OUTREACH NOTE
COPD/PN Week 1 Survey      Responses   [facility] patient discharged from?  [facility]   COVID-19 Test Status  Confirmed   Does the patient have one of the following disease processes/diagnoses(primary or secondary)?  COPD/Pneumonia   Is there a successful TCM telephone encounter documented?  No   Was the primary reason for admission:  Pneumonia   Week 1 attempt successful?  Yes   Call start time  1241   Call end time  1244   Discharge diagnosis  Acute respiratory disease due to COVID-19 virus, PNA   Is patient permission given to speak with other caregiver?  Yes   List who call center can speak with  kenneth Floyd   Meds reviewed with patient/caregiver?  Yes   Is the patient having any side effects they believe may be caused by any medication additions or changes?  No   Does the patient have all medications ordered at discharge?  Yes   Is the patient taking all medications as directed (includes completed medication regime)?  Yes   Does the patient have a primary care provider?   Yes   Does the patient have an appointment with their PCP or pulmonologist within 7 days of discharge?  Yes   Comments regarding PCP  PCP Dr De La Rosa. Appt 6/29/20 245pm telehealth   Has the patient kept scheduled appointments due by today?  N/A   Has home health visited the patient within 72 hours of discharge?  N/A   Pulse Ox monitoring  Intermittent   Pulse Ox device source  Patient   O2 Sat comments  reports oxygen levels 93-94% this morning.    O2 Sat: education provided  Sat levels, Monitoring frequency, When to seek care   Psychosocial issues?  Yes   Psychosocial comments  Reports she is still concerned for her .    Did the patient receive a copy of their discharge instructions?  Yes   Nursing interventions  Reviewed instructions with patient   What is the patient's perception of their health status since discharge?  Improving   Nursing Interventions  Nurse provided patient education   Is the patient/caregiver able to teach  back the hierarchy of who to call/visit for symptoms/problems? PCP, Specialist, Home health nurse, Urgent Care, ED, 911  Yes   Is the patient/caregiver able to teach back signs and symptoms of worsening condition:  Fever/chills, Shortness of breath, Chest pain   Is the patient/caregiver able to teach back importance of completing antibiotic course of treatment?  Yes   Week 1 call completed?  Yes   Wrap up additional comments  Patient on home isolation.           Maikol Serna, RN

## 2020-06-30 ENCOUNTER — READMISSION MANAGEMENT (OUTPATIENT)
Dept: CALL CENTER | Facility: HOSPITAL | Age: 80
End: 2020-06-30

## 2020-06-30 NOTE — OUTREACH NOTE
COPD/PN Week 1 Survey      Responses   [facility] patient discharged from?  [facility]   COVID-19 Test Status  Confirmed   Does the patient have one of the following disease processes/diagnoses(primary or secondary)?  COPD/Pneumonia   Is there a successful TCM telephone encounter documented?  No   Was the primary reason for admission:  Pneumonia   Week 1 attempt successful?  Yes   Call start time  0938   Call end time  0944   Discharge diagnosis  Acute respiratory disease due to COVID-19 virus, PNA   Is patient permission given to speak with other caregiver?  Yes   List who call center can speak with  kenneth Floyd   Meds reviewed with patient/caregiver?  Yes   Is the patient having any side effects they believe may be caused by any medication additions or changes?  No   Does the patient have all medications ordered at discharge?  Yes   Is the patient taking all medications as directed (includes completed medication regime)?  Yes   Does the patient have a primary care provider?   Yes   Does the patient have an appointment with their PCP or pulmonologist within 7 days of discharge?  Yes [telehealth appt]   Comments regarding PCP  PCP Dr De La Rosa. Appt telehealth yesterday   Has the patient kept scheduled appointments due by today?  N/A   Has home health visited the patient within 72 hours of discharge?  N/A   Pulse Ox monitoring  Intermittent   Pulse Ox device source  Patient   O2 Sat comments  Patient reports that her sat is 93-95% on room air.    O2 Sat: education provided  Sat levels, Monitoring frequency, When to seek care   Psychosocial issues?  Yes   Did the patient receive a copy of their discharge instructions?  Yes   Nursing interventions  Reviewed instructions with patient   What is the patient's perception of their health status since discharge?  Improving   Nursing Interventions  Nurse provided patient education   Is the patient/caregiver able to teach back the hierarchy of who to call/visit for  symptoms/problems? PCP, Specialist, Home health nurse, Urgent Care, ED, 911  Yes   Additional teach back comments  Patient's temp 97.0.    Is the patient/caregiver able to teach back signs and symptoms of worsening condition:  Fever/chills, Shortness of breath, Chest pain   Is the patient/caregiver able to teach back importance of completing antibiotic course of treatment?  Yes   Week 1 call completed?  Yes   Wrap up additional comments  Patient on home isolation.           Kiki Singh RN

## 2020-07-03 ENCOUNTER — READMISSION MANAGEMENT (OUTPATIENT)
Dept: CALL CENTER | Facility: HOSPITAL | Age: 80
End: 2020-07-03

## 2020-07-03 NOTE — OUTREACH NOTE
COPD/PN Week 1 Survey      Responses   [facility] patient discharged from?  [facility]   COVID-19 Test Status  Confirmed   Does the patient have one of the following disease processes/diagnoses(primary or secondary)?  COPD/Pneumonia   Is there a successful TCM telephone encounter documented?  No   Was the primary reason for admission:  Pneumonia   Week 1 attempt successful?  Yes   Call start time  0901   Call end time  0911   Discharge diagnosis  Acute respiratory disease due to COVID-19 virus, PNA   Meds reviewed with patient/caregiver?  Yes   Is the patient having any side effects they believe may be caused by any medication additions or changes?  No   Does the patient have all medications ordered at discharge?  Yes   Is the patient taking all medications as directed (includes completed medication regime)?  Yes   Does the patient have a primary care provider?   Yes   Does the patient have an appointment with their PCP or pulmonologist within 7 days of discharge?  Yes   Has the patient kept scheduled appointments due by today?  Yes   Has home health visited the patient within 72 hours of discharge?  N/A   Pulse Ox monitoring  Intermittent   Pulse Ox device source  Patient   O2 Sat comments  95%   O2 Sat: education provided  Sat levels, Monitoring frequency, When to seek care   Psychosocial issues?  Yes   Psychosocial comments   still in the hospital.   Did the patient receive a copy of their discharge instructions?  Yes   Nursing interventions  Reviewed instructions with patient   What is the patient's perception of their health status since discharge?  Improving   Nursing Interventions  Nurse provided patient education   Are the patient's immunizations up to date?   Yes   Nursing interventions  Educated on importance of maintaining up to date immunizations as advised by provider   Is the patient/caregiver able to teach back the hierarchy of who to call/visit for symptoms/problems? PCP, Specialist, Home  health nurse, Urgent Care, ED, 911  Yes   Additional teach back comments  Afebrile, no SOA, she is worried about him and how well she is currently.   Is the patient/caregiver able to teach back signs and symptoms of worsening condition:  Fever/chills, Shortness of breath, Chest pain   Is the patient/caregiver able to teach back importance of completing antibiotic course of treatment?  Yes   Week 1 call completed?  Yes   Wrap up additional comments  Pt is isolated and her kids are visiting in the yard.          Johanna Hansen RN

## 2020-07-09 ENCOUNTER — READMISSION MANAGEMENT (OUTPATIENT)
Dept: CALL CENTER | Facility: HOSPITAL | Age: 80
End: 2020-07-09

## 2020-07-09 NOTE — OUTREACH NOTE
COPD/PN Week 2 Survey      Responses   [facility] patient discharged from?  [facility]   COVID-19 Test Status  Confirmed   Does the patient have one of the following disease processes/diagnoses(primary or secondary)?  COPD/Pneumonia   Was the primary reason for admission:  Pneumonia   Week 2 attempt successful?  Yes   Call start time  0918   Call end time  0922   Meds reviewed with patient/caregiver?  Yes   Is the patient having any side effects they believe may be caused by any medication additions or changes?  No   Does the patient have all medications ordered at discharge?  Yes   Is the patient taking all medications as directed (includes completed medication regime)?  Yes   Medication comments  ANTIBIOTICS ARE COMPLETED   Does the patient have a primary care provider?   Yes   Comments regarding PCP  PATIENT STATES SHE HAS HAD PHONE VISITS WITH HER PCP   Has the patient kept scheduled appointments due by today?  Yes   Has home health visited the patient within 72 hours of discharge?  N/A   Pulse Ox monitoring  Intermittent   Pulse Ox device source  Patient   O2 Sat comments  PATIENT STATES HER O2 SAT IS 96% THIS MORNING   O2 Sat: education provided  Sat levels, When to seek care, Monitoring frequency   Comments  PATIENT STATES SHE IS DOING WELL WITH NO FEVER, COUGH OR SOA. PATIENT'S  REMAINS IN THE HOSPITAL.   Did the patient receive a copy of their discharge instructions?  Yes   Nursing interventions  Reviewed instructions with patient   What is the patient's perception of their health status since discharge?  Improving   Nursing Interventions  Nurse provided patient education   Is the patient/caregiver able to teach back the hierarchy of who to call/visit for symptoms/problems? PCP, Specialist, Home health nurse, Urgent Care, ED, 911  Yes   Is the patient/caregiver able to teach back signs and symptoms of worsening condition:  Fever/chills, Shortness of breath, Chest pain   Is the patient/caregiver  able to teach back importance of completing antibiotic course of treatment?  Yes   Week 2 call completed?  Yes          Maddie Castillo LPN

## 2020-07-12 ENCOUNTER — READMISSION MANAGEMENT (OUTPATIENT)
Dept: CALL CENTER | Facility: HOSPITAL | Age: 80
End: 2020-07-12

## 2020-07-12 NOTE — OUTREACH NOTE
COPD/PN Week 2 Survey      Responses   [facility] patient discharged from?  [facility]   COVID-19 Test Status  Confirmed   Does the patient have one of the following disease processes/diagnoses(primary or secondary)?  COPD/Pneumonia   Was the primary reason for admission:  Pneumonia   Week 2 attempt successful?  Yes   Call start time  1138   Call end time  1140   Discharge diagnosis  Acute respiratory disease due to COVID-19 virus, PNA   Meds reviewed with patient/caregiver?  Yes   Is the patient having any side effects they believe may be caused by any medication additions or changes?  No   Does the patient have all medications ordered at discharge?  Yes   Is the patient taking all medications as directed (includes completed medication regime)?  Yes   Does the patient have a primary care provider?   Yes   Does the patient have an appointment with their PCP or pulmonologist within 7 days of discharge?  Yes   Has the patient kept scheduled appointments due by today?  Yes   Has home health visited the patient within 72 hours of discharge?  N/A   Pulse Ox monitoring  Intermittent   Pulse Ox device source  Patient   O2 Sat comments  reports her oxygen levels running 96%   O2 Sat: education provided  Sat levels, When to seek care, Monitoring frequency   Psychosocial issues?  Yes   Psychosocial comments   still in the hospital.   Did the patient receive a copy of their discharge instructions?  Yes   Nursing interventions  Reviewed instructions with patient   What is the patient's perception of their health status since discharge?  Improving   Nursing Interventions  Nurse provided patient education   Are the patient's immunizations up to date?   Yes   Is the patient/caregiver able to teach back the hierarchy of who to call/visit for symptoms/problems? PCP, Specialist, Home health nurse, Urgent Care, ED, 911  Yes   Is the patient/caregiver able to teach back signs and symptoms of worsening condition:  Fever/chills,  Shortness of breath, Chest pain   Is the patient/caregiver able to teach back importance of completing antibiotic course of treatment?  Yes   Week 2 call completed?  Yes   Wrap up additional comments  Pt reports her kids bring her supplies and check on her.           Maikol Serna RN

## 2020-07-19 ENCOUNTER — READMISSION MANAGEMENT (OUTPATIENT)
Dept: CALL CENTER | Facility: HOSPITAL | Age: 80
End: 2020-07-19

## 2020-07-19 NOTE — OUTREACH NOTE
COPD/PN Week 3 Survey      Responses   [facility] patient discharged from?  [facility]   COVID-19 Test Status  Confirmed   Does the patient have one of the following disease processes/diagnoses(primary or secondary)?  COPD/Pneumonia   Was the primary reason for admission:  Pneumonia   Week 3 attempt successful?  No          Maikol Serna RN

## 2020-07-23 ENCOUNTER — HOSPITAL ENCOUNTER (INPATIENT)
Facility: HOSPITAL | Age: 80
LOS: 4 days | Discharge: HOME | End: 2020-07-28
Attending: EMERGENCY MEDICINE | Admitting: HOSPITALIST

## 2020-07-23 ENCOUNTER — APPOINTMENT (OUTPATIENT)
Dept: GENERAL RADIOLOGY | Facility: HOSPITAL | Age: 80
End: 2020-07-23

## 2020-07-23 DIAGNOSIS — R73.9 HYPERGLYCEMIA: Primary | ICD-10-CM

## 2020-07-23 DIAGNOSIS — I50.9 ACUTE ON CHRONIC CONGESTIVE HEART FAILURE, UNSPECIFIED HEART FAILURE TYPE (HCC): ICD-10-CM

## 2020-07-23 DIAGNOSIS — U07.1 COVID-19: ICD-10-CM

## 2020-07-23 LAB
ALBUMIN SERPL-MCNC: 3.34 G/DL (ref 3.5–5.2)
ALBUMIN/GLOB SERPL: 1 G/DL
ALP SERPL-CCNC: 112 U/L (ref 39–117)
ALT SERPL W P-5'-P-CCNC: 12 U/L (ref 1–33)
ANION GAP SERPL CALCULATED.3IONS-SCNC: 14.9 MMOL/L (ref 5–15)
AST SERPL-CCNC: 20 U/L (ref 1–32)
B PERT DNA SPEC QL NAA+PROBE: NOT DETECTED
BASOPHILS # BLD AUTO: 0.01 10*3/MM3 (ref 0–0.2)
BASOPHILS NFR BLD AUTO: 0.1 % (ref 0–1.5)
BILIRUB SERPL-MCNC: 2 MG/DL (ref 0–1.2)
BUN SERPL-MCNC: 37 MG/DL (ref 8–23)
BUN/CREAT SERPL: 22.7 (ref 7–25)
C PNEUM DNA NPH QL NAA+NON-PROBE: NOT DETECTED
CALCIUM SPEC-SCNC: 9.5 MG/DL (ref 8.6–10.5)
CHLORIDE SERPL-SCNC: 91 MMOL/L (ref 98–107)
CO2 SERPL-SCNC: 21.1 MMOL/L (ref 22–29)
CREAT SERPL-MCNC: 1.63 MG/DL (ref 0.57–1)
CRP SERPL-MCNC: 17.36 MG/DL (ref 0–0.5)
D DIMER PPP FEU-MCNC: 1.93 MCGFEU/ML (ref 0–0.5)
D-LACTATE SERPL-SCNC: 2 MMOL/L (ref 0.5–2)
DEPRECATED RDW RBC AUTO: 47.8 FL (ref 37–54)
EOSINOPHIL # BLD AUTO: 0 10*3/MM3 (ref 0–0.4)
EOSINOPHIL NFR BLD AUTO: 0 % (ref 0.3–6.2)
ERYTHROCYTE [DISTWIDTH] IN BLOOD BY AUTOMATED COUNT: 14.5 % (ref 12.3–15.4)
FERRITIN SERPL-MCNC: 305.2 NG/ML (ref 13–150)
FLUAV H1 2009 PAND RNA NPH QL NAA+PROBE: NOT DETECTED
FLUAV H1 HA GENE NPH QL NAA+PROBE: NOT DETECTED
FLUAV H3 RNA NPH QL NAA+PROBE: NOT DETECTED
FLUAV SUBTYP SPEC NAA+PROBE: NOT DETECTED
FLUBV RNA ISLT QL NAA+PROBE: NOT DETECTED
GFR SERPL CREATININE-BSD FRML MDRD: 30 ML/MIN/1.73
GLOBULIN UR ELPH-MCNC: 3.3 GM/DL
GLUCOSE BLDC GLUCOMTR-MCNC: 314 MG/DL (ref 70–130)
GLUCOSE SERPL-MCNC: 396 MG/DL (ref 65–99)
HADV DNA SPEC NAA+PROBE: NOT DETECTED
HCOV 229E RNA SPEC QL NAA+PROBE: NOT DETECTED
HCOV HKU1 RNA SPEC QL NAA+PROBE: NOT DETECTED
HCOV NL63 RNA SPEC QL NAA+PROBE: NOT DETECTED
HCOV OC43 RNA SPEC QL NAA+PROBE: NOT DETECTED
HCT VFR BLD AUTO: 32.5 % (ref 34–46.6)
HGB BLD-MCNC: 10.4 G/DL (ref 12–15.9)
HMPV RNA NPH QL NAA+NON-PROBE: NOT DETECTED
HPIV1 RNA SPEC QL NAA+PROBE: NOT DETECTED
HPIV2 RNA SPEC QL NAA+PROBE: NOT DETECTED
HPIV3 RNA NPH QL NAA+PROBE: NOT DETECTED
HPIV4 P GENE NPH QL NAA+PROBE: NOT DETECTED
IMM GRANULOCYTES # BLD AUTO: 0.07 10*3/MM3 (ref 0–0.05)
IMM GRANULOCYTES NFR BLD AUTO: 0.5 % (ref 0–0.5)
LDH SERPL-CCNC: 256 U/L (ref 135–214)
LYMPHOCYTES # BLD AUTO: 0.81 10*3/MM3 (ref 0.7–3.1)
LYMPHOCYTES NFR BLD AUTO: 6.1 % (ref 19.6–45.3)
M PNEUMO IGG SER IA-ACNC: NOT DETECTED
MCH RBC QN AUTO: 28.7 PG (ref 26.6–33)
MCHC RBC AUTO-ENTMCNC: 32 G/DL (ref 31.5–35.7)
MCV RBC AUTO: 89.8 FL (ref 79–97)
MONOCYTES # BLD AUTO: 1.06 10*3/MM3 (ref 0.1–0.9)
MONOCYTES NFR BLD AUTO: 8 % (ref 5–12)
NEUTROPHILS NFR BLD AUTO: 11.37 10*3/MM3 (ref 1.7–7)
NEUTROPHILS NFR BLD AUTO: 85.3 % (ref 42.7–76)
NRBC BLD AUTO-RTO: 0 /100 WBC (ref 0–0.2)
NT-PROBNP SERPL-MCNC: ABNORMAL PG/ML (ref 0–1800)
PLATELET # BLD AUTO: 152 10*3/MM3 (ref 140–450)
PMV BLD AUTO: 11.2 FL (ref 6–12)
POTASSIUM SERPL-SCNC: 3.7 MMOL/L (ref 3.5–5.2)
PROT SERPL-MCNC: 6.6 G/DL (ref 6–8.5)
RBC # BLD AUTO: 3.62 10*6/MM3 (ref 3.77–5.28)
RHINOVIRUS RNA SPEC NAA+PROBE: NOT DETECTED
RSV RNA NPH QL NAA+NON-PROBE: NOT DETECTED
SARS-COV-2 RDRP RESP QL NAA+PROBE: DETECTED
SODIUM SERPL-SCNC: 127 MMOL/L (ref 136–145)
WBC # BLD AUTO: 13.32 10*3/MM3 (ref 3.4–10.8)

## 2020-07-23 PROCEDURE — 82962 GLUCOSE BLOOD TEST: CPT

## 2020-07-23 PROCEDURE — 86140 C-REACTIVE PROTEIN: CPT | Performed by: EMERGENCY MEDICINE

## 2020-07-23 PROCEDURE — 83880 ASSAY OF NATRIURETIC PEPTIDE: CPT | Performed by: EMERGENCY MEDICINE

## 2020-07-23 PROCEDURE — 83036 HEMOGLOBIN GLYCOSYLATED A1C: CPT | Performed by: HOSPITALIST

## 2020-07-23 PROCEDURE — 71045 X-RAY EXAM CHEST 1 VIEW: CPT

## 2020-07-23 PROCEDURE — 87635 SARS-COV-2 COVID-19 AMP PRB: CPT | Performed by: EMERGENCY MEDICINE

## 2020-07-23 PROCEDURE — 85379 FIBRIN DEGRADATION QUANT: CPT | Performed by: EMERGENCY MEDICINE

## 2020-07-23 PROCEDURE — 0099U HC BIOFIRE FILMARRAY RESP PANEL 1: CPT | Performed by: EMERGENCY MEDICINE

## 2020-07-23 PROCEDURE — 85025 COMPLETE CBC W/AUTO DIFF WBC: CPT | Performed by: EMERGENCY MEDICINE

## 2020-07-23 PROCEDURE — 82728 ASSAY OF FERRITIN: CPT | Performed by: EMERGENCY MEDICINE

## 2020-07-23 PROCEDURE — 84145 PROCALCITONIN (PCT): CPT | Performed by: EMERGENCY MEDICINE

## 2020-07-23 PROCEDURE — 80053 COMPREHEN METABOLIC PANEL: CPT | Performed by: EMERGENCY MEDICINE

## 2020-07-23 PROCEDURE — 83615 LACTATE (LD) (LDH) ENZYME: CPT | Performed by: EMERGENCY MEDICINE

## 2020-07-23 PROCEDURE — 63710000001 INSULIN REGULAR HUMAN PER 5 UNITS: Performed by: EMERGENCY MEDICINE

## 2020-07-23 PROCEDURE — 99284 EMERGENCY DEPT VISIT MOD MDM: CPT

## 2020-07-23 PROCEDURE — 71045 X-RAY EXAM CHEST 1 VIEW: CPT | Performed by: RADIOLOGY

## 2020-07-23 PROCEDURE — 83605 ASSAY OF LACTIC ACID: CPT | Performed by: EMERGENCY MEDICINE

## 2020-07-23 RX ORDER — CARVEDILOL 3.12 MG/1
3.12 TABLET ORAL 2 TIMES DAILY WITH MEALS
COMMUNITY
End: 2020-07-23

## 2020-07-23 RX ORDER — CARVEDILOL 12.5 MG/1
12.5 TABLET ORAL 2 TIMES DAILY WITH MEALS
COMMUNITY

## 2020-07-23 RX ORDER — POTASSIUM CHLORIDE 20 MEQ/1
40 TABLET, EXTENDED RELEASE ORAL ONCE
Status: COMPLETED | OUTPATIENT
Start: 2020-07-23 | End: 2020-07-23

## 2020-07-23 RX ORDER — BUMETANIDE 0.25 MG/ML
2 INJECTION INTRAMUSCULAR; INTRAVENOUS ONCE
Status: COMPLETED | OUTPATIENT
Start: 2020-07-23 | End: 2020-07-23

## 2020-07-23 RX ADMIN — BUMETANIDE 2 MG: 0.25 INJECTION, SOLUTION INTRAMUSCULAR; INTRAVENOUS at 23:41

## 2020-07-23 RX ADMIN — SODIUM CHLORIDE 1000 ML: 9 INJECTION, SOLUTION INTRAVENOUS at 20:02

## 2020-07-23 RX ADMIN — POTASSIUM CHLORIDE 40 MEQ: 1500 TABLET, EXTENDED RELEASE ORAL at 20:54

## 2020-07-23 RX ADMIN — HUMAN INSULIN 8 UNITS: 100 INJECTION, SOLUTION SUBCUTANEOUS at 20:51

## 2020-07-24 ENCOUNTER — READMISSION MANAGEMENT (OUTPATIENT)
Dept: CALL CENTER | Facility: HOSPITAL | Age: 80
End: 2020-07-24

## 2020-07-24 ENCOUNTER — APPOINTMENT (OUTPATIENT)
Dept: CT IMAGING | Facility: HOSPITAL | Age: 80
End: 2020-07-24

## 2020-07-24 ENCOUNTER — APPOINTMENT (OUTPATIENT)
Dept: CARDIOLOGY | Facility: HOSPITAL | Age: 80
End: 2020-07-24

## 2020-07-24 PROBLEM — U07.1 COVID-19 VIRUS INFECTION: Status: ACTIVE | Noted: 2020-07-24

## 2020-07-24 LAB
ALBUMIN SERPL-MCNC: 3.18 G/DL (ref 3.5–5.2)
ALBUMIN/GLOB SERPL: 1 G/DL
ALP SERPL-CCNC: 103 U/L (ref 39–117)
ALT SERPL W P-5'-P-CCNC: 14 U/L (ref 1–33)
ANION GAP SERPL CALCULATED.3IONS-SCNC: 14.5 MMOL/L (ref 5–15)
AST SERPL-CCNC: 22 U/L (ref 1–32)
BACTERIA UR QL AUTO: ABNORMAL /HPF
BH CV ECHO MEAS - % IVS THICK: 24.3 %
BH CV ECHO MEAS - % LVPW THICK: 37.5 %
BH CV ECHO MEAS - ACS: 2.2 CM
BH CV ECHO MEAS - AO MAX PG: 7.1 MMHG
BH CV ECHO MEAS - AO MEAN PG: 4 MMHG
BH CV ECHO MEAS - AO ROOT AREA (BSA CORRECTED): 1.4
BH CV ECHO MEAS - AO ROOT AREA: 6.6 CM^2
BH CV ECHO MEAS - AO ROOT DIAM: 2.9 CM
BH CV ECHO MEAS - AO V2 MAX: 133 CM/SEC
BH CV ECHO MEAS - AO V2 MEAN: 87.6 CM/SEC
BH CV ECHO MEAS - AO V2 VTI: 26.3 CM
BH CV ECHO MEAS - BSA(HAYCOCK): 2.2 M^2
BH CV ECHO MEAS - BSA: 2.1 M^2
BH CV ECHO MEAS - BZI_BMI: 31 KILOGRAMS/M^2
BH CV ECHO MEAS - BZI_METRIC_HEIGHT: 175.3 CM
BH CV ECHO MEAS - BZI_METRIC_WEIGHT: 95.3 KG
BH CV ECHO MEAS - EDV(CUBED): 115.1 ML
BH CV ECHO MEAS - EDV(MOD-SP4): 54.1 ML
BH CV ECHO MEAS - EDV(TEICH): 110.9 ML
BH CV ECHO MEAS - EF(CUBED): 54.6 %
BH CV ECHO MEAS - EF(MOD-SP4): 59.5 %
BH CV ECHO MEAS - EF(TEICH): 46.2 %
BH CV ECHO MEAS - ESV(CUBED): 52.3 ML
BH CV ECHO MEAS - ESV(MOD-SP4): 21.9 ML
BH CV ECHO MEAS - ESV(TEICH): 59.6 ML
BH CV ECHO MEAS - FS: 23.1 %
BH CV ECHO MEAS - IVS/LVPW: 1.1
BH CV ECHO MEAS - IVSD: 1.2 CM
BH CV ECHO MEAS - IVSS: 1.4 CM
BH CV ECHO MEAS - LA DIMENSION: 3.8 CM
BH CV ECHO MEAS - LA/AO: 1.3
BH CV ECHO MEAS - LV DIASTOLIC VOL/BSA (35-75): 25.7 ML/M^2
BH CV ECHO MEAS - LV MASS(C)D: 197 GRAMS
BH CV ECHO MEAS - LV MASS(C)DI: 93.4 GRAMS/M^2
BH CV ECHO MEAS - LV MASS(C)S: 196.3 GRAMS
BH CV ECHO MEAS - LV MASS(C)SI: 93.1 GRAMS/M^2
BH CV ECHO MEAS - LV SYSTOLIC VOL/BSA (12-30): 10.4 ML/M^2
BH CV ECHO MEAS - LVIDD: 4.9 CM
BH CV ECHO MEAS - LVIDS: 3.7 CM
BH CV ECHO MEAS - LVLD AP4: 6.3 CM
BH CV ECHO MEAS - LVLS AP4: 6 CM
BH CV ECHO MEAS - LVOT AREA (M): 3.1 CM^2
BH CV ECHO MEAS - LVOT AREA: 3.1 CM^2
BH CV ECHO MEAS - LVOT DIAM: 2 CM
BH CV ECHO MEAS - LVPWD: 1 CM
BH CV ECHO MEAS - LVPWS: 1.4 CM
BH CV ECHO MEAS - MV A MAX VEL: 38.8 CM/SEC
BH CV ECHO MEAS - MV E MAX VEL: 104 CM/SEC
BH CV ECHO MEAS - MV E/A: 2.7
BH CV ECHO MEAS - PA ACC TIME: 0.1 SEC
BH CV ECHO MEAS - PA PR(ACCEL): 32.7 MMHG
BH CV ECHO MEAS - RAP SYSTOLE: 10 MMHG
BH CV ECHO MEAS - RVSP: 48.1 MMHG
BH CV ECHO MEAS - SI(AO): 82.4 ML/M^2
BH CV ECHO MEAS - SI(CUBED): 29.8 ML/M^2
BH CV ECHO MEAS - SI(MOD-SP4): 15.3 ML/M^2
BH CV ECHO MEAS - SI(TEICH): 24.3 ML/M^2
BH CV ECHO MEAS - SV(AO): 173.7 ML
BH CV ECHO MEAS - SV(CUBED): 62.8 ML
BH CV ECHO MEAS - SV(MOD-SP4): 32.2 ML
BH CV ECHO MEAS - SV(TEICH): 51.3 ML
BH CV ECHO MEAS - TR MAX VEL: 308.7 CM/SEC
BILIRUB SERPL-MCNC: 1.9 MG/DL (ref 0–1.2)
BILIRUB UR QL STRIP: NEGATIVE
BUN SERPL-MCNC: 39 MG/DL (ref 8–23)
BUN/CREAT SERPL: 25.3 (ref 7–25)
CALCIUM SPEC-SCNC: 9.4 MG/DL (ref 8.6–10.5)
CHLORIDE SERPL-SCNC: 94 MMOL/L (ref 98–107)
CLARITY UR: ABNORMAL
CO2 SERPL-SCNC: 19.5 MMOL/L (ref 22–29)
COLOR UR: YELLOW
CREAT SERPL-MCNC: 1.54 MG/DL (ref 0.57–1)
CRP SERPL-MCNC: 18.74 MG/DL (ref 0–0.5)
D DIMER PPP FEU-MCNC: 1.92 MCGFEU/ML (ref 0–0.5)
D-LACTATE SERPL-SCNC: 1.2 MMOL/L (ref 0.5–2)
DEPRECATED RDW RBC AUTO: 49.1 FL (ref 37–54)
EOSINOPHIL # BLD MANUAL: 0.23 10*3/MM3 (ref 0–0.4)
EOSINOPHIL NFR BLD MANUAL: 2 % (ref 0.3–6.2)
ERYTHROCYTE [DISTWIDTH] IN BLOOD BY AUTOMATED COUNT: 14.6 % (ref 12.3–15.4)
FERRITIN SERPL-MCNC: 297.2 NG/ML (ref 13–150)
GFR SERPL CREATININE-BSD FRML MDRD: 32 ML/MIN/1.73
GLOBULIN UR ELPH-MCNC: 3.1 GM/DL
GLUCOSE BLDC GLUCOMTR-MCNC: 299 MG/DL (ref 70–130)
GLUCOSE BLDC GLUCOMTR-MCNC: 307 MG/DL (ref 70–130)
GLUCOSE BLDC GLUCOMTR-MCNC: 312 MG/DL (ref 70–130)
GLUCOSE BLDC GLUCOMTR-MCNC: 348 MG/DL (ref 70–130)
GLUCOSE BLDC GLUCOMTR-MCNC: 349 MG/DL (ref 70–130)
GLUCOSE BLDC GLUCOMTR-MCNC: 399 MG/DL (ref 70–130)
GLUCOSE BLDC GLUCOMTR-MCNC: 408 MG/DL (ref 70–130)
GLUCOSE SERPL-MCNC: 319 MG/DL (ref 65–99)
GLUCOSE UR STRIP-MCNC: ABNORMAL MG/DL
HBA1C MFR BLD: 10 % (ref 4.8–5.6)
HCT VFR BLD AUTO: 33.1 % (ref 34–46.6)
HGB BLD-MCNC: 10.4 G/DL (ref 12–15.9)
HGB UR QL STRIP.AUTO: ABNORMAL
HYALINE CASTS UR QL AUTO: ABNORMAL /LPF
HYPOCHROMIA BLD QL: ABNORMAL
KETONES UR QL STRIP: NEGATIVE
LDH SERPL-CCNC: 189 U/L (ref 135–214)
LEUKOCYTE ESTERASE UR QL STRIP.AUTO: ABNORMAL
LYMPHOCYTES # BLD MANUAL: 0.7 10*3/MM3 (ref 0.7–3.1)
LYMPHOCYTES NFR BLD MANUAL: 4 % (ref 5–12)
LYMPHOCYTES NFR BLD MANUAL: 6 % (ref 19.6–45.3)
MAXIMAL PREDICTED HEART RATE: 139 BPM
MCH RBC QN AUTO: 28.8 PG (ref 26.6–33)
MCHC RBC AUTO-ENTMCNC: 31.4 G/DL (ref 31.5–35.7)
MCV RBC AUTO: 91.7 FL (ref 79–97)
MONOCYTES # BLD AUTO: 0.47 10*3/MM3 (ref 0.1–0.9)
NEUTROPHILS # BLD AUTO: 10.27 10*3/MM3 (ref 1.7–7)
NEUTROPHILS NFR BLD MANUAL: 82 % (ref 42.7–76)
NEUTS BAND NFR BLD MANUAL: 6 % (ref 0–5)
NITRITE UR QL STRIP: POSITIVE
NT-PROBNP SERPL-MCNC: ABNORMAL PG/ML (ref 0–1800)
PH UR STRIP.AUTO: <=5 [PH] (ref 5–8)
PLAT MORPH BLD: NORMAL
PLATELET # BLD AUTO: 136 10*3/MM3 (ref 140–450)
PMV BLD AUTO: 11.3 FL (ref 6–12)
POTASSIUM SERPL-SCNC: 4.1 MMOL/L (ref 3.5–5.2)
PROCALCITONIN SERPL-MCNC: 6.33 NG/ML (ref 0–0.25)
PROT SERPL-MCNC: 6.3 G/DL (ref 6–8.5)
PROT UR QL STRIP: ABNORMAL
RBC # BLD AUTO: 3.61 10*6/MM3 (ref 3.77–5.28)
RBC # UR: ABNORMAL /HPF
REF LAB TEST METHOD: ABNORMAL
SODIUM SERPL-SCNC: 128 MMOL/L (ref 136–145)
SP GR UR STRIP: 1.01 (ref 1–1.03)
SQUAMOUS #/AREA URNS HPF: ABNORMAL /HPF
STRESS TARGET HR: 118 BPM
TROPONIN T SERPL-MCNC: <0.01 NG/ML (ref 0–0.03)
UROBILINOGEN UR QL STRIP: ABNORMAL
WBC # BLD AUTO: 11.67 10*3/MM3 (ref 3.4–10.8)
WBC UR QL AUTO: ABNORMAL /HPF

## 2020-07-24 PROCEDURE — 84484 ASSAY OF TROPONIN QUANT: CPT | Performed by: HOSPITALIST

## 2020-07-24 PROCEDURE — 25010000002 CEFTRIAXONE PER 250 MG: Performed by: INTERNAL MEDICINE

## 2020-07-24 PROCEDURE — 80053 COMPREHEN METABOLIC PANEL: CPT | Performed by: HOSPITALIST

## 2020-07-24 PROCEDURE — 71250 CT THORAX DX C-: CPT

## 2020-07-24 PROCEDURE — 82962 GLUCOSE BLOOD TEST: CPT

## 2020-07-24 PROCEDURE — 63710000001 INSULIN DETEMIR PER 5 UNITS: Performed by: HOSPITALIST

## 2020-07-24 PROCEDURE — 99233 SBSQ HOSP IP/OBS HIGH 50: CPT | Performed by: INTERNAL MEDICINE

## 2020-07-24 PROCEDURE — 87186 SC STD MICRODIL/AGAR DIL: CPT | Performed by: HOSPITALIST

## 2020-07-24 PROCEDURE — 83605 ASSAY OF LACTIC ACID: CPT | Performed by: HOSPITALIST

## 2020-07-24 PROCEDURE — 85025 COMPLETE CBC W/AUTO DIFF WBC: CPT | Performed by: HOSPITALIST

## 2020-07-24 PROCEDURE — 63710000001 INSULIN ASPART PER 5 UNITS: Performed by: INTERNAL MEDICINE

## 2020-07-24 PROCEDURE — 93306 TTE W/DOPPLER COMPLETE: CPT

## 2020-07-24 PROCEDURE — 99223 1ST HOSP IP/OBS HIGH 75: CPT | Performed by: HOSPITALIST

## 2020-07-24 PROCEDURE — 82728 ASSAY OF FERRITIN: CPT | Performed by: HOSPITALIST

## 2020-07-24 PROCEDURE — 93306 TTE W/DOPPLER COMPLETE: CPT | Performed by: INTERNAL MEDICINE

## 2020-07-24 PROCEDURE — 93005 ELECTROCARDIOGRAM TRACING: CPT | Performed by: HOSPITALIST

## 2020-07-24 PROCEDURE — 85007 BL SMEAR W/DIFF WBC COUNT: CPT | Performed by: HOSPITALIST

## 2020-07-24 PROCEDURE — 87086 URINE CULTURE/COLONY COUNT: CPT | Performed by: HOSPITALIST

## 2020-07-24 PROCEDURE — 83615 LACTATE (LD) (LDH) ENZYME: CPT | Performed by: HOSPITALIST

## 2020-07-24 PROCEDURE — 83880 ASSAY OF NATRIURETIC PEPTIDE: CPT | Performed by: HOSPITALIST

## 2020-07-24 PROCEDURE — 81001 URINALYSIS AUTO W/SCOPE: CPT | Performed by: HOSPITALIST

## 2020-07-24 PROCEDURE — 86140 C-REACTIVE PROTEIN: CPT | Performed by: HOSPITALIST

## 2020-07-24 PROCEDURE — 63710000001 INSULIN ASPART PER 5 UNITS: Performed by: HOSPITALIST

## 2020-07-24 PROCEDURE — 93010 ELECTROCARDIOGRAM REPORT: CPT | Performed by: INTERNAL MEDICINE

## 2020-07-24 PROCEDURE — 87076 CULTURE ANAEROBE IDENT EACH: CPT | Performed by: HOSPITALIST

## 2020-07-24 PROCEDURE — 85379 FIBRIN DEGRADATION QUANT: CPT | Performed by: HOSPITALIST

## 2020-07-24 PROCEDURE — 25010000002 DEXAMETHASONE PER 1 MG: Performed by: NURSE PRACTITIONER

## 2020-07-24 RX ORDER — ATORVASTATIN CALCIUM 10 MG/1
10 TABLET, FILM COATED ORAL NIGHTLY
Status: DISCONTINUED | OUTPATIENT
Start: 2020-07-24 | End: 2020-07-28 | Stop reason: HOSPADM

## 2020-07-24 RX ORDER — CARVEDILOL 6.25 MG/1
12.5 TABLET ORAL 2 TIMES DAILY WITH MEALS
Status: DISCONTINUED | OUTPATIENT
Start: 2020-07-24 | End: 2020-07-28 | Stop reason: HOSPADM

## 2020-07-24 RX ORDER — DEXTROSE MONOHYDRATE 25 G/50ML
25 INJECTION, SOLUTION INTRAVENOUS
Status: DISCONTINUED | OUTPATIENT
Start: 2020-07-24 | End: 2020-07-28 | Stop reason: HOSPADM

## 2020-07-24 RX ORDER — SODIUM CHLORIDE 0.9 % (FLUSH) 0.9 %
10 SYRINGE (ML) INJECTION EVERY 12 HOURS SCHEDULED
Status: DISCONTINUED | OUTPATIENT
Start: 2020-07-24 | End: 2020-07-28 | Stop reason: HOSPADM

## 2020-07-24 RX ORDER — FUROSEMIDE 40 MG/1
40 TABLET ORAL
Status: CANCELLED | OUTPATIENT
Start: 2020-07-24

## 2020-07-24 RX ORDER — ASPIRIN 81 MG/1
81 TABLET, CHEWABLE ORAL DAILY
Status: DISCONTINUED | OUTPATIENT
Start: 2020-07-24 | End: 2020-07-28 | Stop reason: HOSPADM

## 2020-07-24 RX ORDER — SODIUM CHLORIDE 0.9 % (FLUSH) 0.9 %
10 SYRINGE (ML) INJECTION AS NEEDED
Status: DISCONTINUED | OUTPATIENT
Start: 2020-07-24 | End: 2020-07-28 | Stop reason: HOSPADM

## 2020-07-24 RX ORDER — LANOLIN ALCOHOL/MO/W.PET/CERES
500 CREAM (GRAM) TOPICAL DAILY
Status: DISCONTINUED | OUTPATIENT
Start: 2020-07-24 | End: 2020-07-28 | Stop reason: HOSPADM

## 2020-07-24 RX ORDER — NICOTINE POLACRILEX 4 MG
15 LOZENGE BUCCAL
Status: DISCONTINUED | OUTPATIENT
Start: 2020-07-24 | End: 2020-07-28 | Stop reason: HOSPADM

## 2020-07-24 RX ORDER — FERROUS SULFATE 325(65) MG
325 TABLET ORAL
Status: DISCONTINUED | OUTPATIENT
Start: 2020-07-24 | End: 2020-07-28 | Stop reason: HOSPADM

## 2020-07-24 RX ORDER — PIOGLITAZONEHYDROCHLORIDE 15 MG/1
15 TABLET ORAL DAILY
Status: CANCELLED | OUTPATIENT
Start: 2020-07-24

## 2020-07-24 RX ORDER — LOSARTAN POTASSIUM 50 MG/1
100 TABLET ORAL DAILY
Status: CANCELLED | OUTPATIENT
Start: 2020-07-24

## 2020-07-24 RX ORDER — GLIPIZIDE 5 MG/1
5 TABLET ORAL
Status: CANCELLED | OUTPATIENT
Start: 2020-07-24

## 2020-07-24 RX ORDER — DEXAMETHASONE SODIUM PHOSPHATE 4 MG/ML
6 INJECTION, SOLUTION INTRA-ARTICULAR; INTRALESIONAL; INTRAMUSCULAR; INTRAVENOUS; SOFT TISSUE ONCE
Status: COMPLETED | OUTPATIENT
Start: 2020-07-24 | End: 2020-07-24

## 2020-07-24 RX ORDER — AMLODIPINE BESYLATE 5 MG/1
5 TABLET ORAL
Status: DISCONTINUED | OUTPATIENT
Start: 2020-07-24 | End: 2020-07-28 | Stop reason: HOSPADM

## 2020-07-24 RX ADMIN — INSULIN DETEMIR 10 UNITS: 100 INJECTION, SOLUTION SUBCUTANEOUS at 06:10

## 2020-07-24 RX ADMIN — CEFTRIAXONE 2 G: 2 INJECTION, POWDER, FOR SOLUTION INTRAMUSCULAR; INTRAVENOUS at 09:05

## 2020-07-24 RX ADMIN — LINAGLIPTIN 5 MG: 5 TABLET, FILM COATED ORAL at 09:04

## 2020-07-24 RX ADMIN — APIXABAN 5 MG: 5 TABLET, FILM COATED ORAL at 15:55

## 2020-07-24 RX ADMIN — CARVEDILOL 12.5 MG: 6.25 TABLET, FILM COATED ORAL at 20:56

## 2020-07-24 RX ADMIN — DOXYCYCLINE 100 MG: 100 INJECTION, POWDER, LYOPHILIZED, FOR SOLUTION INTRAVENOUS at 01:49

## 2020-07-24 RX ADMIN — INSULIN ASPART 5 UNITS: 100 INJECTION, SOLUTION INTRAVENOUS; SUBCUTANEOUS at 16:37

## 2020-07-24 RX ADMIN — Medication 500 MCG: at 09:04

## 2020-07-24 RX ADMIN — ASPIRIN 81 MG: 81 TABLET, CHEWABLE ORAL at 09:04

## 2020-07-24 RX ADMIN — SODIUM CHLORIDE, PRESERVATIVE FREE 10 ML: 5 INJECTION INTRAVENOUS at 22:16

## 2020-07-24 RX ADMIN — INSULIN ASPART 8 UNITS: 100 INJECTION, SOLUTION INTRAVENOUS; SUBCUTANEOUS at 13:19

## 2020-07-24 RX ADMIN — FERROUS SULFATE TAB 325 MG (65 MG ELEMENTAL FE) 325 MG: 325 (65 FE) TAB at 09:04

## 2020-07-24 RX ADMIN — SODIUM CHLORIDE, PRESERVATIVE FREE 10 ML: 5 INJECTION INTRAVENOUS at 09:06

## 2020-07-24 RX ADMIN — CARVEDILOL 12.5 MG: 6.25 TABLET, FILM COATED ORAL at 09:04

## 2020-07-24 RX ADMIN — APIXABAN 5 MG: 5 TABLET, FILM COATED ORAL at 20:56

## 2020-07-24 RX ADMIN — ATORVASTATIN CALCIUM 10 MG: 10 TABLET, FILM COATED ORAL at 20:56

## 2020-07-24 RX ADMIN — INSULIN ASPART 6 UNITS: 100 INJECTION, SOLUTION INTRAVENOUS; SUBCUTANEOUS at 06:15

## 2020-07-24 RX ADMIN — DEXAMETHASONE SODIUM PHOSPHATE 6 MG: 4 INJECTION INTRA-ARTICULAR; INTRALESIONAL; INTRAMUSCULAR; INTRAVENOUS; SOFT TISSUE at 01:49

## 2020-07-24 RX ADMIN — INSULIN ASPART 7 UNITS: 100 INJECTION, SOLUTION INTRAVENOUS; SUBCUTANEOUS at 17:22

## 2020-07-24 RX ADMIN — AMLODIPINE BESYLATE 5 MG: 5 TABLET ORAL at 09:04

## 2020-07-24 RX ADMIN — INSULIN ASPART 7 UNITS: 100 INJECTION, SOLUTION INTRAVENOUS; SUBCUTANEOUS at 03:48

## 2020-07-24 NOTE — OUTREACH NOTE
COPD/PN Week 4 Survey      Responses   [facility] patient discharged from?  [facility]   COVID-19 Test Status  Confirmed   Does the patient have one of the following disease processes/diagnoses(primary or secondary)?  COPD/Pneumonia   Was the primary reason for admission:  Pneumonia   Week 4 attempt successful?  No   Revoke  Readmitted          Missy Piña RN

## 2020-07-25 ENCOUNTER — APPOINTMENT (OUTPATIENT)
Dept: ULTRASOUND IMAGING | Facility: HOSPITAL | Age: 80
End: 2020-07-25

## 2020-07-25 LAB
ACETONE BLD QL: NEGATIVE
ALBUMIN SERPL-MCNC: 2.91 G/DL (ref 3.5–5.2)
ALBUMIN/GLOB SERPL: 0.8 G/DL
ALP SERPL-CCNC: 136 U/L (ref 39–117)
ALT SERPL W P-5'-P-CCNC: 18 U/L (ref 1–33)
ANION GAP SERPL CALCULATED.3IONS-SCNC: 17.4 MMOL/L (ref 5–15)
AST SERPL-CCNC: 25 U/L (ref 1–32)
BILIRUB SERPL-MCNC: 0.9 MG/DL (ref 0–1.2)
BUN SERPL-MCNC: 49 MG/DL (ref 8–23)
BUN/CREAT SERPL: 36.8 (ref 7–25)
CALCIUM SPEC-SCNC: 9.6 MG/DL (ref 8.6–10.5)
CHLORIDE SERPL-SCNC: 91 MMOL/L (ref 98–107)
CO2 SERPL-SCNC: 18.6 MMOL/L (ref 22–29)
CREAT SERPL-MCNC: 1.33 MG/DL (ref 0.57–1)
CRP SERPL-MCNC: 15.71 MG/DL (ref 0–0.5)
DEPRECATED RDW RBC AUTO: 46.5 FL (ref 37–54)
ERYTHROCYTE [DISTWIDTH] IN BLOOD BY AUTOMATED COUNT: 14.5 % (ref 12.3–15.4)
GFR SERPL CREATININE-BSD FRML MDRD: 38 ML/MIN/1.73
GLOBULIN UR ELPH-MCNC: 3.7 GM/DL
GLUCOSE BLDC GLUCOMTR-MCNC: 322 MG/DL (ref 70–130)
GLUCOSE BLDC GLUCOMTR-MCNC: 348 MG/DL (ref 70–130)
GLUCOSE BLDC GLUCOMTR-MCNC: 368 MG/DL (ref 70–130)
GLUCOSE BLDC GLUCOMTR-MCNC: 409 MG/DL (ref 70–130)
GLUCOSE SERPL-MCNC: 397 MG/DL (ref 65–99)
HCT VFR BLD AUTO: 35.5 % (ref 34–46.6)
HGB BLD-MCNC: 11.6 G/DL (ref 12–15.9)
HYPOCHROMIA BLD QL: ABNORMAL
LYMPHOCYTES # BLD MANUAL: 0.69 10*3/MM3 (ref 0.7–3.1)
LYMPHOCYTES NFR BLD MANUAL: 5 % (ref 19.6–45.3)
LYMPHOCYTES NFR BLD MANUAL: 8 % (ref 5–12)
MAGNESIUM SERPL-MCNC: 1.9 MG/DL (ref 1.6–2.4)
MCH RBC QN AUTO: 28.5 PG (ref 26.6–33)
MCHC RBC AUTO-ENTMCNC: 32.7 G/DL (ref 31.5–35.7)
MCV RBC AUTO: 87.2 FL (ref 79–97)
MONOCYTES # BLD AUTO: 1.1 10*3/MM3 (ref 0.1–0.9)
NEUTROPHILS # BLD AUTO: 11.95 10*3/MM3 (ref 1.7–7)
NEUTROPHILS NFR BLD MANUAL: 86 % (ref 42.7–76)
NEUTS BAND NFR BLD MANUAL: 1 % (ref 0–5)
PHOSPHATE SERPL-MCNC: 2.4 MG/DL (ref 2.5–4.5)
PLAT MORPH BLD: NORMAL
PLATELET # BLD AUTO: 150 10*3/MM3 (ref 140–450)
PMV BLD AUTO: 12 FL (ref 6–12)
POTASSIUM SERPL-SCNC: 4.2 MMOL/L (ref 3.5–5.2)
PROT SERPL-MCNC: 6.6 G/DL (ref 6–8.5)
RBC # BLD AUTO: 4.07 10*6/MM3 (ref 3.77–5.28)
SCAN SLIDE: NORMAL
SODIUM SERPL-SCNC: 127 MMOL/L (ref 136–145)
WBC # BLD AUTO: 13.74 10*3/MM3 (ref 3.4–10.8)

## 2020-07-25 PROCEDURE — 82009 KETONE BODYS QUAL: CPT | Performed by: INTERNAL MEDICINE

## 2020-07-25 PROCEDURE — 99232 SBSQ HOSP IP/OBS MODERATE 35: CPT | Performed by: INTERNAL MEDICINE

## 2020-07-25 PROCEDURE — 25010000002 CEFTRIAXONE PER 250 MG: Performed by: INTERNAL MEDICINE

## 2020-07-25 PROCEDURE — 63710000001 INSULIN DETEMIR PER 5 UNITS: Performed by: INTERNAL MEDICINE

## 2020-07-25 PROCEDURE — 85025 COMPLETE CBC W/AUTO DIFF WBC: CPT | Performed by: INTERNAL MEDICINE

## 2020-07-25 PROCEDURE — 94799 UNLISTED PULMONARY SVC/PX: CPT

## 2020-07-25 PROCEDURE — 84100 ASSAY OF PHOSPHORUS: CPT | Performed by: INTERNAL MEDICINE

## 2020-07-25 PROCEDURE — 86140 C-REACTIVE PROTEIN: CPT | Performed by: INTERNAL MEDICINE

## 2020-07-25 PROCEDURE — 85007 BL SMEAR W/DIFF WBC COUNT: CPT | Performed by: INTERNAL MEDICINE

## 2020-07-25 PROCEDURE — 83735 ASSAY OF MAGNESIUM: CPT | Performed by: INTERNAL MEDICINE

## 2020-07-25 PROCEDURE — 63710000001 INSULIN ASPART PER 5 UNITS: Performed by: INTERNAL MEDICINE

## 2020-07-25 PROCEDURE — 93970 EXTREMITY STUDY: CPT

## 2020-07-25 PROCEDURE — 63710000001 INSULIN ASPART PER 5 UNITS

## 2020-07-25 PROCEDURE — 80053 COMPREHEN METABOLIC PANEL: CPT | Performed by: INTERNAL MEDICINE

## 2020-07-25 PROCEDURE — 82962 GLUCOSE BLOOD TEST: CPT

## 2020-07-25 PROCEDURE — 25010000002 MAGNESIUM SULFATE 2 GM/50ML SOLUTION: Performed by: INTERNAL MEDICINE

## 2020-07-25 PROCEDURE — 93970 EXTREMITY STUDY: CPT | Performed by: RADIOLOGY

## 2020-07-25 RX ORDER — SODIUM CHLORIDE 9 MG/ML
100 INJECTION, SOLUTION INTRAVENOUS CONTINUOUS
Status: DISCONTINUED | OUTPATIENT
Start: 2020-07-25 | End: 2020-07-28 | Stop reason: HOSPADM

## 2020-07-25 RX ORDER — MAGNESIUM SULFATE HEPTAHYDRATE 40 MG/ML
2 INJECTION, SOLUTION INTRAVENOUS ONCE
Status: COMPLETED | OUTPATIENT
Start: 2020-07-25 | End: 2020-07-25

## 2020-07-25 RX ADMIN — SODIUM CHLORIDE, PRESERVATIVE FREE 10 ML: 5 INJECTION INTRAVENOUS at 09:00

## 2020-07-25 RX ADMIN — LINAGLIPTIN 5 MG: 5 TABLET, FILM COATED ORAL at 08:58

## 2020-07-25 RX ADMIN — INSULIN ASPART 10 UNITS: 100 INJECTION, SOLUTION INTRAVENOUS; SUBCUTANEOUS at 18:03

## 2020-07-25 RX ADMIN — APIXABAN 5 MG: 5 TABLET, FILM COATED ORAL at 21:10

## 2020-07-25 RX ADMIN — APIXABAN 5 MG: 5 TABLET, FILM COATED ORAL at 08:58

## 2020-07-25 RX ADMIN — CARVEDILOL 12.5 MG: 6.25 TABLET, FILM COATED ORAL at 17:29

## 2020-07-25 RX ADMIN — MAGNESIUM SULFATE HEPTAHYDRATE 2 G: 40 INJECTION, SOLUTION INTRAVENOUS at 14:49

## 2020-07-25 RX ADMIN — INSULIN ASPART 12 UNITS: 100 INJECTION, SOLUTION INTRAVENOUS; SUBCUTANEOUS at 11:57

## 2020-07-25 RX ADMIN — AMLODIPINE BESYLATE 5 MG: 5 TABLET ORAL at 09:07

## 2020-07-25 RX ADMIN — FERROUS SULFATE TAB 325 MG (65 MG ELEMENTAL FE) 325 MG: 325 (65 FE) TAB at 08:59

## 2020-07-25 RX ADMIN — SODIUM CHLORIDE 60 ML/HR: 9 INJECTION, SOLUTION INTRAVENOUS at 14:48

## 2020-07-25 RX ADMIN — INSULIN DETEMIR 5 UNITS: 100 INJECTION, SOLUTION SUBCUTANEOUS at 21:11

## 2020-07-25 RX ADMIN — ATORVASTATIN CALCIUM 10 MG: 10 TABLET, FILM COATED ORAL at 21:10

## 2020-07-25 RX ADMIN — ASPIRIN 81 MG: 81 TABLET, CHEWABLE ORAL at 08:58

## 2020-07-25 RX ADMIN — INSULIN ASPART 10 UNITS: 100 INJECTION, SOLUTION INTRAVENOUS; SUBCUTANEOUS at 08:58

## 2020-07-25 RX ADMIN — INSULIN ASPART 14 UNITS: 100 INJECTION, SOLUTION INTRAVENOUS; SUBCUTANEOUS at 17:30

## 2020-07-25 RX ADMIN — SODIUM CHLORIDE, PRESERVATIVE FREE 10 ML: 5 INJECTION INTRAVENOUS at 21:11

## 2020-07-25 RX ADMIN — INSULIN DETEMIR 15 UNITS: 100 INJECTION, SOLUTION SUBCUTANEOUS at 06:22

## 2020-07-25 RX ADMIN — CEFTRIAXONE 2 G: 2 INJECTION, POWDER, FOR SOLUTION INTRAMUSCULAR; INTRAVENOUS at 08:58

## 2020-07-25 RX ADMIN — Medication 500 MCG: at 08:59

## 2020-07-25 RX ADMIN — CARVEDILOL 12.5 MG: 6.25 TABLET, FILM COATED ORAL at 09:04

## 2020-07-26 LAB
ALBUMIN SERPL-MCNC: 2.48 G/DL (ref 3.5–5.2)
ALBUMIN/GLOB SERPL: 0.7 G/DL
ALP SERPL-CCNC: 161 U/L (ref 39–117)
ALT SERPL W P-5'-P-CCNC: 21 U/L (ref 1–33)
ANION GAP SERPL CALCULATED.3IONS-SCNC: 12.8 MMOL/L (ref 5–15)
AST SERPL-CCNC: 25 U/L (ref 1–32)
BACTERIA SPEC AEROBE CULT: ABNORMAL
BACTERIA SPEC AEROBE CULT: ABNORMAL
BASOPHILS # BLD AUTO: 0.02 10*3/MM3 (ref 0–0.2)
BASOPHILS NFR BLD AUTO: 0.1 % (ref 0–1.5)
BILIRUB SERPL-MCNC: 0.5 MG/DL (ref 0–1.2)
BUN SERPL-MCNC: 49 MG/DL (ref 8–23)
BUN/CREAT SERPL: 41.5 (ref 7–25)
CALCIUM SPEC-SCNC: 9.2 MG/DL (ref 8.6–10.5)
CHLORIDE SERPL-SCNC: 98 MMOL/L (ref 98–107)
CO2 SERPL-SCNC: 20.2 MMOL/L (ref 22–29)
CREAT SERPL-MCNC: 1.18 MG/DL (ref 0.57–1)
CRP SERPL-MCNC: 7.32 MG/DL (ref 0–0.5)
DEPRECATED RDW RBC AUTO: 46.4 FL (ref 37–54)
EOSINOPHIL # BLD AUTO: 0 10*3/MM3 (ref 0–0.4)
EOSINOPHIL NFR BLD AUTO: 0 % (ref 0.3–6.2)
ERYTHROCYTE [DISTWIDTH] IN BLOOD BY AUTOMATED COUNT: 14.5 % (ref 12.3–15.4)
GFR SERPL CREATININE-BSD FRML MDRD: 44 ML/MIN/1.73
GLOBULIN UR ELPH-MCNC: 3.3 GM/DL
GLUCOSE BLDC GLUCOMTR-MCNC: 204 MG/DL (ref 70–130)
GLUCOSE BLDC GLUCOMTR-MCNC: 252 MG/DL (ref 70–130)
GLUCOSE BLDC GLUCOMTR-MCNC: 284 MG/DL (ref 70–130)
GLUCOSE BLDC GLUCOMTR-MCNC: 290 MG/DL (ref 70–130)
GLUCOSE SERPL-MCNC: 245 MG/DL (ref 65–99)
HCT VFR BLD AUTO: 34 % (ref 34–46.6)
HGB BLD-MCNC: 10.8 G/DL (ref 12–15.9)
IMM GRANULOCYTES # BLD AUTO: 0.09 10*3/MM3 (ref 0–0.05)
IMM GRANULOCYTES NFR BLD AUTO: 0.7 % (ref 0–0.5)
LYMPHOCYTES # BLD AUTO: 0.64 10*3/MM3 (ref 0.7–3.1)
LYMPHOCYTES NFR BLD AUTO: 4.6 % (ref 19.6–45.3)
MAGNESIUM SERPL-MCNC: 2.4 MG/DL (ref 1.6–2.4)
MCH RBC QN AUTO: 28.2 PG (ref 26.6–33)
MCHC RBC AUTO-ENTMCNC: 31.8 G/DL (ref 31.5–35.7)
MCV RBC AUTO: 88.8 FL (ref 79–97)
MONOCYTES # BLD AUTO: 1.03 10*3/MM3 (ref 0.1–0.9)
MONOCYTES NFR BLD AUTO: 7.4 % (ref 5–12)
NEUTROPHILS NFR BLD AUTO: 12.06 10*3/MM3 (ref 1.7–7)
NEUTROPHILS NFR BLD AUTO: 87.2 % (ref 42.7–76)
NRBC BLD AUTO-RTO: 0 /100 WBC (ref 0–0.2)
PHOSPHATE SERPL-MCNC: 2.1 MG/DL (ref 2.5–4.5)
PLATELET # BLD AUTO: 165 10*3/MM3 (ref 140–450)
PMV BLD AUTO: 11.4 FL (ref 6–12)
POTASSIUM SERPL-SCNC: 3.9 MMOL/L (ref 3.5–5.2)
PROT SERPL-MCNC: 5.8 G/DL (ref 6–8.5)
RBC # BLD AUTO: 3.83 10*6/MM3 (ref 3.77–5.28)
SODIUM SERPL-SCNC: 131 MMOL/L (ref 136–145)
WBC # BLD AUTO: 13.84 10*3/MM3 (ref 3.4–10.8)

## 2020-07-26 PROCEDURE — 86140 C-REACTIVE PROTEIN: CPT | Performed by: INTERNAL MEDICINE

## 2020-07-26 PROCEDURE — 25010000002 CEFTRIAXONE PER 250 MG: Performed by: INTERNAL MEDICINE

## 2020-07-26 PROCEDURE — 82962 GLUCOSE BLOOD TEST: CPT

## 2020-07-26 PROCEDURE — 84100 ASSAY OF PHOSPHORUS: CPT | Performed by: INTERNAL MEDICINE

## 2020-07-26 PROCEDURE — 63710000001 INSULIN ASPART PER 5 UNITS

## 2020-07-26 PROCEDURE — 99232 SBSQ HOSP IP/OBS MODERATE 35: CPT | Performed by: INTERNAL MEDICINE

## 2020-07-26 PROCEDURE — 83735 ASSAY OF MAGNESIUM: CPT | Performed by: INTERNAL MEDICINE

## 2020-07-26 PROCEDURE — 80053 COMPREHEN METABOLIC PANEL: CPT | Performed by: INTERNAL MEDICINE

## 2020-07-26 PROCEDURE — 63710000001 INSULIN DETEMIR PER 5 UNITS: Performed by: INTERNAL MEDICINE

## 2020-07-26 PROCEDURE — 25010000002 ERTAPENEM PER 500 MG: Performed by: NURSE PRACTITIONER

## 2020-07-26 PROCEDURE — 85025 COMPLETE CBC W/AUTO DIFF WBC: CPT | Performed by: INTERNAL MEDICINE

## 2020-07-26 RX ADMIN — CARVEDILOL 12.5 MG: 6.25 TABLET, FILM COATED ORAL at 17:38

## 2020-07-26 RX ADMIN — LINAGLIPTIN 5 MG: 5 TABLET, FILM COATED ORAL at 08:07

## 2020-07-26 RX ADMIN — INSULIN ASPART 5 UNITS: 100 INJECTION, SOLUTION INTRAVENOUS; SUBCUTANEOUS at 07:35

## 2020-07-26 RX ADMIN — CARVEDILOL 12.5 MG: 6.25 TABLET, FILM COATED ORAL at 08:07

## 2020-07-26 RX ADMIN — POTASSIUM & SODIUM PHOSPHATES POWDER PACK 280-160-250 MG 2 PACKET: 280-160-250 PACK at 12:07

## 2020-07-26 RX ADMIN — SODIUM CHLORIDE, PRESERVATIVE FREE 10 ML: 5 INJECTION INTRAVENOUS at 08:07

## 2020-07-26 RX ADMIN — APIXABAN 5 MG: 5 TABLET, FILM COATED ORAL at 08:07

## 2020-07-26 RX ADMIN — INSULIN DETEMIR 20 UNITS: 100 INJECTION, SOLUTION SUBCUTANEOUS at 06:11

## 2020-07-26 RX ADMIN — ASPIRIN 81 MG: 81 TABLET, CHEWABLE ORAL at 08:07

## 2020-07-26 RX ADMIN — CEFTRIAXONE 2 G: 2 INJECTION, POWDER, FOR SOLUTION INTRAMUSCULAR; INTRAVENOUS at 08:06

## 2020-07-26 RX ADMIN — ATORVASTATIN CALCIUM 10 MG: 10 TABLET, FILM COATED ORAL at 20:48

## 2020-07-26 RX ADMIN — Medication 500 MCG: at 08:07

## 2020-07-26 RX ADMIN — SODIUM CHLORIDE, PRESERVATIVE FREE 10 ML: 5 INJECTION INTRAVENOUS at 08:06

## 2020-07-26 RX ADMIN — FERROUS SULFATE TAB 325 MG (65 MG ELEMENTAL FE) 325 MG: 325 (65 FE) TAB at 08:08

## 2020-07-26 RX ADMIN — INSULIN ASPART 8 UNITS: 100 INJECTION, SOLUTION INTRAVENOUS; SUBCUTANEOUS at 12:08

## 2020-07-26 RX ADMIN — ERTAPENEM SODIUM 1 G: 1 INJECTION, POWDER, LYOPHILIZED, FOR SOLUTION INTRAMUSCULAR; INTRAVENOUS at 12:04

## 2020-07-26 RX ADMIN — SODIUM CHLORIDE, PRESERVATIVE FREE 10 ML: 5 INJECTION INTRAVENOUS at 20:47

## 2020-07-26 RX ADMIN — AMLODIPINE BESYLATE 5 MG: 5 TABLET ORAL at 08:11

## 2020-07-26 RX ADMIN — APIXABAN 5 MG: 5 TABLET, FILM COATED ORAL at 20:48

## 2020-07-26 RX ADMIN — INSULIN ASPART 8 UNITS: 100 INJECTION, SOLUTION INTRAVENOUS; SUBCUTANEOUS at 17:38

## 2020-07-26 RX ADMIN — SODIUM CHLORIDE 60 ML/HR: 9 INJECTION, SOLUTION INTRAVENOUS at 20:52

## 2020-07-27 LAB
ALBUMIN SERPL-MCNC: 2.46 G/DL (ref 3.5–5.2)
ALBUMIN/GLOB SERPL: 0.8 G/DL
ALP SERPL-CCNC: 119 U/L (ref 39–117)
ALT SERPL W P-5'-P-CCNC: 21 U/L (ref 1–33)
ANION GAP SERPL CALCULATED.3IONS-SCNC: 12.2 MMOL/L (ref 5–15)
AST SERPL-CCNC: 22 U/L (ref 1–32)
BASOPHILS # BLD AUTO: 0.02 10*3/MM3 (ref 0–0.2)
BASOPHILS NFR BLD AUTO: 0.2 % (ref 0–1.5)
BILIRUB SERPL-MCNC: 0.6 MG/DL (ref 0–1.2)
BUN SERPL-MCNC: 41 MG/DL (ref 8–23)
BUN/CREAT SERPL: 39.8 (ref 7–25)
CALCIUM SPEC-SCNC: 8.7 MG/DL (ref 8.6–10.5)
CHLORIDE SERPL-SCNC: 100 MMOL/L (ref 98–107)
CO2 SERPL-SCNC: 16.8 MMOL/L (ref 22–29)
CREAT SERPL-MCNC: 1.03 MG/DL (ref 0.57–1)
CRP SERPL-MCNC: 5.17 MG/DL (ref 0–0.5)
DEPRECATED RDW RBC AUTO: 48.2 FL (ref 37–54)
EOSINOPHIL # BLD AUTO: 0.03 10*3/MM3 (ref 0–0.4)
EOSINOPHIL NFR BLD AUTO: 0.2 % (ref 0.3–6.2)
ERYTHROCYTE [DISTWIDTH] IN BLOOD BY AUTOMATED COUNT: 14.6 % (ref 12.3–15.4)
GFR SERPL CREATININE-BSD FRML MDRD: 51 ML/MIN/1.73
GLOBULIN UR ELPH-MCNC: 3.1 GM/DL
GLUCOSE BLDC GLUCOMTR-MCNC: 112 MG/DL (ref 70–130)
GLUCOSE BLDC GLUCOMTR-MCNC: 133 MG/DL (ref 70–130)
GLUCOSE BLDC GLUCOMTR-MCNC: 155 MG/DL (ref 70–130)
GLUCOSE BLDC GLUCOMTR-MCNC: 159 MG/DL (ref 70–130)
GLUCOSE SERPL-MCNC: 179 MG/DL (ref 65–99)
HCT VFR BLD AUTO: 34.2 % (ref 34–46.6)
HGB BLD-MCNC: 10.9 G/DL (ref 12–15.9)
IMM GRANULOCYTES # BLD AUTO: 0.11 10*3/MM3 (ref 0–0.05)
IMM GRANULOCYTES NFR BLD AUTO: 0.8 % (ref 0–0.5)
LYMPHOCYTES # BLD AUTO: 0.83 10*3/MM3 (ref 0.7–3.1)
LYMPHOCYTES NFR BLD AUTO: 6.4 % (ref 19.6–45.3)
MAGNESIUM SERPL-MCNC: 2.1 MG/DL (ref 1.6–2.4)
MCH RBC QN AUTO: 28.7 PG (ref 26.6–33)
MCHC RBC AUTO-ENTMCNC: 31.9 G/DL (ref 31.5–35.7)
MCV RBC AUTO: 90 FL (ref 79–97)
MONOCYTES # BLD AUTO: 1.4 10*3/MM3 (ref 0.1–0.9)
MONOCYTES NFR BLD AUTO: 10.8 % (ref 5–12)
NEUTROPHILS NFR BLD AUTO: 10.62 10*3/MM3 (ref 1.7–7)
NEUTROPHILS NFR BLD AUTO: 81.6 % (ref 42.7–76)
NRBC BLD AUTO-RTO: 0 /100 WBC (ref 0–0.2)
PHOSPHATE SERPL-MCNC: 1.9 MG/DL (ref 2.5–4.5)
PHOSPHATE SERPL-MCNC: 2.3 MG/DL (ref 2.5–4.5)
PLATELET # BLD AUTO: 148 10*3/MM3 (ref 140–450)
PMV BLD AUTO: 11.1 FL (ref 6–12)
POTASSIUM SERPL-SCNC: 3.8 MMOL/L (ref 3.5–5.2)
PROT SERPL-MCNC: 5.6 G/DL (ref 6–8.5)
RBC # BLD AUTO: 3.8 10*6/MM3 (ref 3.77–5.28)
SODIUM SERPL-SCNC: 129 MMOL/L (ref 136–145)
SODIUM UR-SCNC: 29 MMOL/L
WBC # BLD AUTO: 13.01 10*3/MM3 (ref 3.4–10.8)

## 2020-07-27 PROCEDURE — 84100 ASSAY OF PHOSPHORUS: CPT | Performed by: INTERNAL MEDICINE

## 2020-07-27 PROCEDURE — 82962 GLUCOSE BLOOD TEST: CPT

## 2020-07-27 PROCEDURE — 83735 ASSAY OF MAGNESIUM: CPT | Performed by: INTERNAL MEDICINE

## 2020-07-27 PROCEDURE — 86140 C-REACTIVE PROTEIN: CPT | Performed by: INTERNAL MEDICINE

## 2020-07-27 PROCEDURE — 80053 COMPREHEN METABOLIC PANEL: CPT | Performed by: INTERNAL MEDICINE

## 2020-07-27 PROCEDURE — 25010000002 ERTAPENEM PER 500 MG: Performed by: NURSE PRACTITIONER

## 2020-07-27 PROCEDURE — 85025 COMPLETE CBC W/AUTO DIFF WBC: CPT | Performed by: INTERNAL MEDICINE

## 2020-07-27 PROCEDURE — 63710000001 INSULIN ASPART PER 5 UNITS

## 2020-07-27 PROCEDURE — 99232 SBSQ HOSP IP/OBS MODERATE 35: CPT | Performed by: INTERNAL MEDICINE

## 2020-07-27 PROCEDURE — 84300 ASSAY OF URINE SODIUM: CPT | Performed by: INTERNAL MEDICINE

## 2020-07-27 PROCEDURE — 63710000001 INSULIN DETEMIR PER 5 UNITS: Performed by: INTERNAL MEDICINE

## 2020-07-27 RX ORDER — AMOXICILLIN 250 MG
2 CAPSULE ORAL 2 TIMES DAILY
Status: DISCONTINUED | OUTPATIENT
Start: 2020-07-27 | End: 2020-07-28 | Stop reason: HOSPADM

## 2020-07-27 RX ORDER — LACTULOSE 10 G/15ML
30 SOLUTION ORAL ONCE
Status: COMPLETED | OUTPATIENT
Start: 2020-07-27 | End: 2020-07-27

## 2020-07-27 RX ADMIN — LINAGLIPTIN 5 MG: 5 TABLET, FILM COATED ORAL at 08:25

## 2020-07-27 RX ADMIN — ASPIRIN 81 MG: 81 TABLET, CHEWABLE ORAL at 08:25

## 2020-07-27 RX ADMIN — POTASSIUM & SODIUM PHOSPHATES POWDER PACK 280-160-250 MG 2 PACKET: 280-160-250 PACK at 06:10

## 2020-07-27 RX ADMIN — ATORVASTATIN CALCIUM 10 MG: 10 TABLET, FILM COATED ORAL at 21:28

## 2020-07-27 RX ADMIN — SODIUM CHLORIDE 100 ML/HR: 9 INJECTION, SOLUTION INTRAVENOUS at 21:27

## 2020-07-27 RX ADMIN — SODIUM CHLORIDE, PRESERVATIVE FREE 10 ML: 5 INJECTION INTRAVENOUS at 08:26

## 2020-07-27 RX ADMIN — SODIUM CHLORIDE, PRESERVATIVE FREE 10 ML: 5 INJECTION INTRAVENOUS at 21:29

## 2020-07-27 RX ADMIN — INSULIN DETEMIR 20 UNITS: 100 INJECTION, SOLUTION SUBCUTANEOUS at 06:11

## 2020-07-27 RX ADMIN — LACTULOSE 30 G: 10 SOLUTION ORAL at 10:59

## 2020-07-27 RX ADMIN — APIXABAN 5 MG: 5 TABLET, FILM COATED ORAL at 08:25

## 2020-07-27 RX ADMIN — AMLODIPINE BESYLATE 5 MG: 5 TABLET ORAL at 08:25

## 2020-07-27 RX ADMIN — POTASSIUM & SODIUM PHOSPHATES POWDER PACK 280-160-250 MG 2 PACKET: 280-160-250 PACK at 21:28

## 2020-07-27 RX ADMIN — CARVEDILOL 12.5 MG: 6.25 TABLET, FILM COATED ORAL at 07:36

## 2020-07-27 RX ADMIN — SODIUM CHLORIDE 2000 ML: 9 INJECTION, SOLUTION INTRAVENOUS at 12:48

## 2020-07-27 RX ADMIN — ERTAPENEM SODIUM 1 G: 1 INJECTION, POWDER, LYOPHILIZED, FOR SOLUTION INTRAMUSCULAR; INTRAVENOUS at 11:05

## 2020-07-27 RX ADMIN — SODIUM CHLORIDE, PRESERVATIVE FREE 10 ML: 5 INJECTION INTRAVENOUS at 21:28

## 2020-07-27 RX ADMIN — FERROUS SULFATE TAB 325 MG (65 MG ELEMENTAL FE) 325 MG: 325 (65 FE) TAB at 07:36

## 2020-07-27 RX ADMIN — INSULIN ASPART 3 UNITS: 100 INJECTION, SOLUTION INTRAVENOUS; SUBCUTANEOUS at 11:05

## 2020-07-27 RX ADMIN — Medication 500 MCG: at 08:25

## 2020-07-27 RX ADMIN — CARVEDILOL 12.5 MG: 6.25 TABLET, FILM COATED ORAL at 16:58

## 2020-07-27 RX ADMIN — DOCUSATE SODIUM 50MG AND SENNOSIDES 8.6MG 2 TABLET: 8.6; 5 TABLET, FILM COATED ORAL at 10:59

## 2020-07-27 RX ADMIN — APIXABAN 5 MG: 5 TABLET, FILM COATED ORAL at 21:28

## 2020-07-27 RX ADMIN — INSULIN ASPART 3 UNITS: 100 INJECTION, SOLUTION INTRAVENOUS; SUBCUTANEOUS at 07:36

## 2020-07-28 VITALS
OXYGEN SATURATION: 97 % | HEART RATE: 70 BPM | DIASTOLIC BLOOD PRESSURE: 73 MMHG | SYSTOLIC BLOOD PRESSURE: 131 MMHG | TEMPERATURE: 97.8 F | BODY MASS INDEX: 30.81 KG/M2 | WEIGHT: 208 LBS | RESPIRATION RATE: 18 BRPM | HEIGHT: 69 IN

## 2020-07-28 LAB
ANION GAP SERPL CALCULATED.3IONS-SCNC: 11.2 MMOL/L (ref 5–15)
BUN SERPL-MCNC: 29 MG/DL (ref 8–23)
BUN/CREAT SERPL: 30.5 (ref 7–25)
CALCIUM SPEC-SCNC: 8.2 MG/DL (ref 8.6–10.5)
CHLORIDE SERPL-SCNC: 102 MMOL/L (ref 98–107)
CO2 SERPL-SCNC: 16.8 MMOL/L (ref 22–29)
CREAT SERPL-MCNC: 0.95 MG/DL (ref 0.57–1)
GFR SERPL CREATININE-BSD FRML MDRD: 56 ML/MIN/1.73
GLUCOSE BLDC GLUCOMTR-MCNC: 180 MG/DL (ref 70–130)
GLUCOSE BLDC GLUCOMTR-MCNC: 223 MG/DL (ref 70–130)
GLUCOSE BLDC GLUCOMTR-MCNC: 92 MG/DL (ref 70–130)
GLUCOSE SERPL-MCNC: 143 MG/DL (ref 65–99)
MAGNESIUM SERPL-MCNC: 1.9 MG/DL (ref 1.6–2.4)
PHOSPHATE SERPL-MCNC: 2.8 MG/DL (ref 2.5–4.5)
POTASSIUM SERPL-SCNC: 3.6 MMOL/L (ref 3.5–5.2)
SODIUM SERPL-SCNC: 130 MMOL/L (ref 136–145)

## 2020-07-28 PROCEDURE — 25010000002 ERTAPENEM PER 500 MG: Performed by: NURSE PRACTITIONER

## 2020-07-28 PROCEDURE — 63710000001 INSULIN DETEMIR PER 5 UNITS: Performed by: INTERNAL MEDICINE

## 2020-07-28 PROCEDURE — 84100 ASSAY OF PHOSPHORUS: CPT | Performed by: INTERNAL MEDICINE

## 2020-07-28 PROCEDURE — 83735 ASSAY OF MAGNESIUM: CPT | Performed by: INTERNAL MEDICINE

## 2020-07-28 PROCEDURE — 80048 BASIC METABOLIC PNL TOTAL CA: CPT | Performed by: INTERNAL MEDICINE

## 2020-07-28 PROCEDURE — 82962 GLUCOSE BLOOD TEST: CPT

## 2020-07-28 PROCEDURE — 99238 HOSP IP/OBS DSCHRG MGMT 30/<: CPT | Performed by: INTERNAL MEDICINE

## 2020-07-28 PROCEDURE — 63710000001 INSULIN ASPART PER 5 UNITS

## 2020-07-28 RX ORDER — AMOXICILLIN 250 MG
2 CAPSULE ORAL 2 TIMES DAILY
Qty: 120 TABLET | Refills: 2 | Status: SHIPPED | OUTPATIENT
Start: 2020-07-28 | End: 2020-08-27

## 2020-07-28 RX ORDER — GRANULES FOR ORAL 3 G/1
3 POWDER ORAL 2 TIMES WEEKLY
Qty: 6 G | Refills: 0 | Status: SHIPPED | OUTPATIENT
Start: 2020-07-30 | End: 2020-08-05

## 2020-07-28 RX ORDER — L.ACID,PARA/B.BIFIDUM/S.THERM 8B CELL
1 CAPSULE ORAL DAILY
Status: DISCONTINUED | OUTPATIENT
Start: 2020-07-28 | End: 2020-07-28 | Stop reason: HOSPADM

## 2020-07-28 RX ADMIN — FERROUS SULFATE TAB 325 MG (65 MG ELEMENTAL FE) 325 MG: 325 (65 FE) TAB at 07:32

## 2020-07-28 RX ADMIN — SODIUM CHLORIDE, PRESERVATIVE FREE 10 ML: 5 INJECTION INTRAVENOUS at 08:24

## 2020-07-28 RX ADMIN — Medication 500 MCG: at 08:24

## 2020-07-28 RX ADMIN — ERTAPENEM SODIUM 1 G: 1 INJECTION, POWDER, LYOPHILIZED, FOR SOLUTION INTRAMUSCULAR; INTRAVENOUS at 11:03

## 2020-07-28 RX ADMIN — POTASSIUM & SODIUM PHOSPHATES POWDER PACK 280-160-250 MG 2 PACKET: 280-160-250 PACK at 17:06

## 2020-07-28 RX ADMIN — INSULIN ASPART 3 UNITS: 100 INJECTION, SOLUTION INTRAVENOUS; SUBCUTANEOUS at 17:17

## 2020-07-28 RX ADMIN — DOCUSATE SODIUM 50MG AND SENNOSIDES 8.6MG 2 TABLET: 8.6; 5 TABLET, FILM COATED ORAL at 08:24

## 2020-07-28 RX ADMIN — SODIUM CHLORIDE, PRESERVATIVE FREE 10 ML: 5 INJECTION INTRAVENOUS at 08:25

## 2020-07-28 RX ADMIN — ASPIRIN 81 MG: 81 TABLET, CHEWABLE ORAL at 08:24

## 2020-07-28 RX ADMIN — POTASSIUM & SODIUM PHOSPHATES POWDER PACK 280-160-250 MG 2 PACKET: 280-160-250 PACK at 07:32

## 2020-07-28 RX ADMIN — CARVEDILOL 12.5 MG: 6.25 TABLET, FILM COATED ORAL at 17:05

## 2020-07-28 RX ADMIN — POTASSIUM & SODIUM PHOSPHATES POWDER PACK 280-160-250 MG 2 PACKET: 280-160-250 PACK at 11:04

## 2020-07-28 RX ADMIN — INSULIN ASPART 5 UNITS: 100 INJECTION, SOLUTION INTRAVENOUS; SUBCUTANEOUS at 11:07

## 2020-07-28 RX ADMIN — Medication 1 CAPSULE: at 17:05

## 2020-07-28 RX ADMIN — CARVEDILOL 12.5 MG: 6.25 TABLET, FILM COATED ORAL at 07:32

## 2020-07-28 RX ADMIN — INSULIN DETEMIR 20 UNITS: 100 INJECTION, SOLUTION SUBCUTANEOUS at 07:35

## 2020-07-28 RX ADMIN — LINAGLIPTIN 5 MG: 5 TABLET, FILM COATED ORAL at 08:24

## 2020-07-28 RX ADMIN — AMLODIPINE BESYLATE 5 MG: 5 TABLET ORAL at 08:24

## 2020-07-28 RX ADMIN — APIXABAN 5 MG: 5 TABLET, FILM COATED ORAL at 08:24

## 2020-07-29 ENCOUNTER — READMISSION MANAGEMENT (OUTPATIENT)
Dept: CALL CENTER | Facility: HOSPITAL | Age: 80
End: 2020-07-29

## 2020-07-29 NOTE — OUTREACH NOTE
Prep Survey      Responses   [facility]?  [facility]   Is LACE score < 7 ?  No   Eligibility  Readm Mgmt   Discharge diagnosis  Sepsis due to UTI   COVID-19 Test Status  Confirmed   Does the patient have one of the following disease processes/diagnoses(primary or secondary)?  Sepsis   Does the patient have Home health ordered?  No   Is there a DME ordered?  No   Prep survey completed?  Yes          Jenny Bond RN

## 2020-07-29 NOTE — OUTREACH NOTE
Sepsis Week 1 Survey      Responses   [facility] patient discharged from?  [facility]   COVID-19 Test Status  Confirmed   Does the patient have one of the following disease processes/diagnoses(primary or secondary)?  Sepsis   Is there a successful TCM telephone encounter documented?  No   Week 1 attempt successful?  Yes   Call start time  1650   Call end time  1655   Discharge diagnosis  Sepsis due to UTI   Meds reviewed with patient/caregiver?  Yes   Is the patient having any side effects they believe may be caused by any medication additions or changes?  No   Is the patient taking all medications as directed (includes completed medication regime)?  Yes   Does the patient have a primary care provider?   Yes   Has the patient kept scheduled appointments due by today?  N/A   Has home health visited the patient within 72 hours of discharge?  N/A   Pulse Ox monitoring  Intermittent   Pulse Ox device source  Patient   O2 Sat comments  96% on Room air   O2 Sat: education provided  Sat levels, Monitoring frequency, When to seek care   Psychosocial issues?  No   Did the patient receive a copy of their discharge instructions?  Yes   Nursing interventions  Reviewed instructions with patient   What is the patient's perception of their health status since discharge?  Improving   Nursing interventions  Nurse provided patient education   Is the patient/caregiver able to teach back Sepsis?  E - Extreme pain or generalized discomfort (worst ever,especially abdomen), S - Shivering,fever or very cold, P - Pale or discolored skin, S - Sleepy, difficult to arouse,confused, I -   I feel like I might die-a feeling of hopelessness, S - Short of breath   Nursing interventions  Nurse provided reassurance to patient   Is patient/caregiver able to teach back steps to recovery at home?  Set small, achievable goals for return to baseline health, Rest and regain strength, Make a list of questions for PCP appoinment   Is the patient/caregiver  able to teach back signs and symptoms of worsening condition:  Fever, Edema, Shortness of breath/rapid respiratory rate   Is the patient/caregiver able to teach back the hierarchy of who to call/visit for symptoms/problems? PCP, Specialist, Home health nurse, Urgent Care, ED, 911  Yes   Additional teach back comments  Afbrele, no SOA, almost no cough, glad to be home.   She will change her toothbrush.   Week 1 call completed?  Yes   Wrap up additional comments  She is doing better today she says.          Johanna Hansen RN

## 2020-07-30 ENCOUNTER — READMISSION MANAGEMENT (OUTPATIENT)
Dept: CALL CENTER | Facility: HOSPITAL | Age: 80
End: 2020-07-30

## 2020-07-30 NOTE — OUTREACH NOTE
Sepsis Week 1 Survey      Responses   [facility] patient discharged from?  [facility]   COVID-19 Test Status  Confirmed   Does the patient have one of the following disease processes/diagnoses(primary or secondary)?  Sepsis   Is there a successful TCM telephone encounter documented?  No   Week 1 attempt successful?  Yes   Call start time  0833   Call end time  0843   Meds reviewed with patient/caregiver?  Yes   Is the patient having any side effects they believe may be caused by any medication additions or changes?  No   Does the patient have all medications related to this admission filled (includes all antibiotics, inhalers, nebulizers,steroids,etc.)  Yes   Is the patient taking all medications as directed (includes completed medication regime)?  Yes   Does the patient have a primary care provider?   Yes   Does the patient have an appointment with their PCP within 7 days of discharge?  No   What is preventing the patient from scheduling follow up appointments within 7 days of discharge?  Haven't had time   Nursing Interventions  Advised patient to make appointment, Educated patient on importance of making appointment   Has the patient kept scheduled appointments due by today?  N/A   Comments  States will call PCP office today for appt.   Has home health visited the patient within 72 hours of discharge?  N/A   Pulse Ox monitoring  Intermittent   Pulse Ox device source  Patient   O2 Sat comments  95% on room air   Psychosocial issues?  No   Did the patient receive a copy of their discharge instructions?  Yes   Nursing interventions  Reviewed instructions with patient   What is the patient's perception of their health status since discharge?  Improving   Is patient/caregiver able to teach back steps to recovery at home?  Eat a balanced diet   Is the patient/caregiver able to teach back signs and symptoms of worsening condition:  Rapid heart rate (>90), Altered mental status(confusion/coma), Edema   Is the  "patient/caregiver able to teach back the hierarchy of who to call/visit for symptoms/problems? PCP, Specialist, Home health nurse, Urgent Care, ED, 911  Yes   Week 1 call completed?  Yes   Wrap up additional comments  States is \"doing okay\" today-denies any cough, fever, SOA, or chest pain. STates BS was 193 this morning, HR 73. States feet swollen. Instructed to discuss pedal edema and BS with PCP today-voiced understanding. Denies any needs today.          Alyssa Cornejo RN  "

## 2020-07-31 ENCOUNTER — READMISSION MANAGEMENT (OUTPATIENT)
Dept: CALL CENTER | Facility: HOSPITAL | Age: 80
End: 2020-07-31

## 2020-07-31 NOTE — OUTREACH NOTE
Sepsis Week 1 Survey      Responses   [facility] patient discharged from?  [place]   COVID-19 Test Status  Confirmed   Does the patient have one of the following disease processes/diagnoses(primary or secondary)?  Sepsis   Is there a successful TCM telephone encounter documented?  No   Week 1 attempt successful?  Yes   Call start time  0856   Call end time  0905   Meds reviewed with patient/caregiver?  Yes   Is the patient having any side effects they believe may be caused by any medication additions or changes?  No   Does the patient have all medications related to this admission filled (includes all antibiotics, inhalers, nebulizers,steroids,etc.)  Yes   Is the patient taking all medications as directed (includes completed medication regime)?  Yes   Does the patient have a primary care provider?   Yes   Does the patient have an appointment with their PCP within 7 days of discharge?  Yes   Has the patient kept scheduled appointments due by today?  Yes   Comments  States had telehealth appt with PCP today.   Has home health visited the patient within 72 hours of discharge?  N/A   Pulse Ox monitoring  Intermittent   Pulse Ox device source  Patient   O2 Sat comments  O2 sat 96% today on room air   O2 Sat: education provided  Sat levels, Monitoring frequency, When to seek care   O2 Sat education comments  Instructed to seek care if O2 sats remain below 92%.   Psychosocial issues?  No   Psychosocial comments   is home and doing well.   Did the patient receive a copy of their discharge instructions?  Yes   Nursing interventions  Reviewed instructions with patient   What is the patient's perception of their health status since discharge?  Improving   Nursing interventions  Nurse provided patient education   Is the patient/caregiver able to teach back the hierarchy of who to call/visit for symptoms/problems? PCP, Specialist, Home health nurse, Urgent Care, ED, 911  Yes   Week 1 call completed?  Yes   Wrap up  "additional comments  States is slowly improving-denies any fever, cough, SOA, or chest pain.  this morning. STates pedal edema continues-states PCP is calling in some \"medicine for that\". Denies any needs today. Son is helping her and her .          Alyssa Cornejo RN  "

## 2020-08-02 ENCOUNTER — READMISSION MANAGEMENT (OUTPATIENT)
Dept: CALL CENTER | Facility: HOSPITAL | Age: 80
End: 2020-08-02

## 2020-08-02 NOTE — OUTREACH NOTE
Sepsis Week 1 Survey      Responses   [facility] patient discharged from?  [facility]   COVID-19 Test Status  Confirmed   Does the patient have one of the following disease processes/diagnoses(primary or secondary)?  Sepsis   Is there a successful TCM telephone encounter documented?  No   Week 1 attempt successful?  Yes   Call start time  1528   Call end time  1532   Discharge diagnosis  Sepsis due to UTI   Meds reviewed with patient/caregiver?  Yes   Is the patient having any side effects they believe may be caused by any medication additions or changes?  No   Does the patient have all medications related to this admission filled (includes all antibiotics, inhalers, nebulizers,steroids,etc.)  Yes   Is the patient taking all medications as directed (includes completed medication regime)?  Yes   Does the patient have a primary care provider?   Yes   Does the patient have an appointment with their PCP within 7 days of discharge?  Yes   Has the patient kept scheduled appointments due by today?  Yes   Has home health visited the patient within 72 hours of discharge?  N/A   Pulse Ox monitoring  Intermittent   Pulse Ox device source  Patient   O2 Sat comments  Room air and in the 90's   O2 Sat: education provided  Sat levels, Monitoring frequency, When to seek care   Psychosocial issues?  No   Did the patient receive a copy of their discharge instructions?  Yes   Nursing interventions  Reviewed instructions with patient   What is the patient's perception of their health status since discharge?  Improving   Nursing interventions  Nurse provided patient education   Is the patient/caregiver able to teach back Sepsis?  S - Shivering,fever or very cold, E - Extreme pain or generalized discomfort (worst ever,especially abdomen), S - Short of breath   Nursing interventions  Nurse provided reassurance to patient   Is patient/caregiver able to teach back steps to recovery at home?  Set small, achievable goals for return to  baseline health, Rest and regain strength, Make a list of questions for PCP appoinment, Eat a balanced diet   Is the patient/caregiver able to teach back signs and symptoms of worsening condition:  Rapid heart rate (>90), Fever, Edema   Is the patient/caregiver able to teach back the hierarchy of who to call/visit for symptoms/problems? PCP, Specialist, Home health nurse, Urgent Care, ED, 911  Yes   Additional teach back comments  Legs are still swollen fairly large.  Afebrile, no cough, no SOA.  Encouraged to call health dept to determine end  of quarantine.  Encouraged daily weights.   Week 1 call completed?  Yes   Wrap up additional comments  Improving.          Johanna Hansen RN

## 2020-08-05 ENCOUNTER — READMISSION MANAGEMENT (OUTPATIENT)
Dept: CALL CENTER | Facility: HOSPITAL | Age: 80
End: 2020-08-05

## 2020-08-05 NOTE — OUTREACH NOTE
Sepsis Week 2 Survey      Responses   [facility] patient discharged from?  [facility]   COVID-19 Test Status  Confirmed   Does the patient have one of the following disease processes/diagnoses(primary or secondary)?  Sepsis   Week 2 attempt successful?  Yes   Call start time  1658   Call end time  1702   Discharge diagnosis  Sepsis due to UTI   Is patient permission given to speak with other caregiver?  Yes   Meds reviewed with patient/caregiver?  Yes   Is the patient having any side effects they believe may be caused by any medication additions or changes?  No   Does the patient have all medications related to this admission filled (includes all antibiotics, inhalers, nebulizers,steroids,etc.)  Yes   Is the patient taking all medications as directed (includes completed medication regime)?  Yes   Does the patient have a primary care provider?   Yes   Does the patient have an appointment with their PCP within 7 days of discharge?  Yes   Has the patient kept scheduled appointments due by today?  Yes   Has home health visited the patient within 72 hours of discharge?  N/A   Pulse Ox monitoring  Intermittent   Pulse Ox device source  Patient   O2 Sat comments  Room Air in middle 90's   O2 Sat: education provided  Sat levels, Monitoring frequency, When to seek care   Psychosocial issues?  No   Comments  No fever, cough, soa.  Appetite and sleep are both good.   Did the patient receive a copy of their discharge instructions?  Yes   Nursing interventions  Reviewed instructions with patient   What is the patient's perception of their health status since discharge?  Improving   Nursing interventions  Nurse provided patient education   Is the patient/caregiver able to teach back Sepsis?  S - Shivering,fever or very cold, E - Extreme pain or generalized discomfort (worst ever,especially abdomen), S - Short of breath   Nursing interventions  Nurse provided reassurance to patient   Is patient/caregiver able to teach back steps  to recovery at home?  Set small, achievable goals for return to baseline health, Rest and regain strength, Make a list of questions for PCP appoinment, Exercise as tolerated   Is the patient/caregiver able to teach back signs and symptoms of worsening condition:  Fever, Edema   Is the patient/caregiver able to teach back the hierarchy of who to call/visit for symptoms/problems? PCP, Specialist, Home health nurse, Urgent Care, ED, 911  Yes   Additional teach back comments  Legs are down today and yesterday.  Still needs to call the health department.     Week 2 call completed?  Yes   Wrap up additional comments  Improving, propping legs up.          Johanna Hansen RN

## 2020-08-06 ENCOUNTER — TRANSCRIBE ORDERS (OUTPATIENT)
Dept: ADMINISTRATIVE | Facility: HOSPITAL | Age: 80
End: 2020-08-06

## 2020-08-06 DIAGNOSIS — Z20.828 EXPOSURE TO SARS-ASSOCIATED CORONAVIRUS: Primary | ICD-10-CM

## 2020-08-08 ENCOUNTER — READMISSION MANAGEMENT (OUTPATIENT)
Dept: CALL CENTER | Facility: HOSPITAL | Age: 80
End: 2020-08-08

## 2020-08-08 NOTE — OUTREACH NOTE
Sepsis Week 2 Survey      Responses   [facility] patient discharged from?  [facility]   COVID-19 Test Status  Confirmed   Does the patient have one of the following disease processes/diagnoses(primary or secondary)?  Sepsis   Week 2 attempt successful?  Yes   Call start time  1401   Call end time  1403   Discharge diagnosis  Sepsis due to UTI   Meds reviewed with patient/caregiver?  Yes   Is the patient having any side effects they believe may be caused by any medication additions or changes?  No   Does the patient have all medications related to this admission filled (includes all antibiotics, inhalers, nebulizers,steroids,etc.)  Yes   Is the patient taking all medications as directed (includes completed medication regime)?  Yes   Does the patient have a primary care provider?   Yes   Comments regarding PCP  pt reports she has another appt on 8/17/2020   Does the patient have an appointment with their PCP within 7 days of discharge?  Yes   Has the patient kept scheduled appointments due by today?  Yes   Has home health visited the patient within 72 hours of discharge?  N/A   Pulse Ox monitoring  Intermittent   Pulse Ox device source  Patient   O2 Sat comments  reports oxygen levels are running 96%   O2 Sat: education provided  Sat levels, Monitoring frequency, When to seek care   Psychosocial issues?  No   Comments  No fever, cough, soa.  Appetite and sleep are both good.   Did the patient receive a copy of their discharge instructions?  Yes   Nursing interventions  Reviewed instructions with patient   What is the patient's perception of their health status since discharge?  Improving   Nursing interventions  Nurse provided patient education   Is the patient/caregiver able to teach back Sepsis?  S - Shivering,fever or very cold, E - Extreme pain or generalized discomfort (worst ever,especially abdomen), S - Short of breath   Nursing interventions  Nurse provided reassurance to patient   Is patient/caregiver able  to teach back steps to recovery at home?  Set small, achievable goals for return to baseline health, Rest and regain strength, Make a list of questions for PCP appoinment, Exercise as tolerated   Is the patient/caregiver able to teach back signs and symptoms of worsening condition:  Fever, Edema, Hyperthermia, Shortness of breath/rapid respiratory rate   Is the patient/caregiver able to teach back the hierarchy of who to call/visit for symptoms/problems? PCP, Specialist, Home health nurse, Urgent Care, ED, 911  Yes   Week 2 call completed?  Yes          Maikol Serna RN

## 2020-08-10 ENCOUNTER — LAB (OUTPATIENT)
Dept: LAB | Facility: HOSPITAL | Age: 80
End: 2020-08-10

## 2020-08-10 DIAGNOSIS — Z20.828 EXPOSURE TO SARS-ASSOCIATED CORONAVIRUS: ICD-10-CM

## 2020-08-10 LAB
REF LAB TEST METHOD: NORMAL
SARS-COV-2 RNA RESP QL NAA+PROBE: NOT DETECTED

## 2020-08-10 PROCEDURE — U0002 COVID-19 LAB TEST NON-CDC: HCPCS

## 2020-08-10 PROCEDURE — U0004 COV-19 TEST NON-CDC HGH THRU: HCPCS

## 2020-08-10 PROCEDURE — C9803 HOPD COVID-19 SPEC COLLECT: HCPCS

## 2020-08-17 ENCOUNTER — READMISSION MANAGEMENT (OUTPATIENT)
Dept: CALL CENTER | Facility: HOSPITAL | Age: 80
End: 2020-08-17

## 2020-08-17 NOTE — OUTREACH NOTE
"Sepsis Week 3 Survey      Responses   [facility] patient discharged from?  [facility]   COVID-19 Test Status  Confirmed   Does the patient have one of the following disease processes/diagnoses(primary or secondary)?  Sepsis   Week 3 attempt successful?  Yes   Call start time  1038   Call end time  1042   Meds reviewed with patient/caregiver?  Yes   Is the patient having any side effects they believe may be caused by any medication additions or changes?  No   Does the patient have all medications related to this admission filled (includes all antibiotics, inhalers, nebulizers,steroids,etc.)  Yes   Is the patient taking all medications as directed (includes completed medication regime)?  Yes   Does the patient have a primary care provider?   Yes   Comments regarding PCP  PATIENT STATES SHE HAS HAD ONE PHONE APPOINTMENT WITH HER PCP AND HAS ANOTHER ONE TODAY   Has the patient kept scheduled appointments due by today?  Yes   Has home health visited the patient within 72 hours of discharge?  N/A   Pulse Ox monitoring  None   Comments  PATIENT STATES, \"I'M A LITTLE WEAK AND A LITTLE TIRED, BUT I'M DOING MUCH BETTER.\"   Did the patient receive a copy of their discharge instructions?  Yes   Nursing interventions  Reviewed instructions with patient   What is the patient's perception of their health status since discharge?  Improving   Nursing interventions  Nurse provided patient education   Is the patient/caregiver able to teach back Sepsis?  S - Shivering,fever or very cold, E - Extreme pain or generalized discomfort (worst ever,especially abdomen), P - Pale or discolored skin, S - Sleepy, difficult to arouse,confused, I -   I feel like I might die-a feeling of hopelessness, S - Short of breath   Nursing interventions  Nurse provided reassurance to patient   Is patient/caregiver able to teach back steps to recovery at home?  Set small, achievable goals for return to baseline health, Rest and regain strength, Make a list " of questions for PCP appoinment   Is the patient/caregiver able to teach back signs and symptoms of worsening condition:  Fever, Hyperthermia, Rapid heart rate (>90), Shortness of breath/rapid respiratory rate, Altered mental status(confusion/coma), Edema, High blood glucose without diabetes   Is the patient/caregiver able to teach back the hierarchy of who to call/visit for symptoms/problems? PCP, Specialist, Home health nurse, Urgent Care, ED, 911  Yes   Week 3 call completed?  Yes          Maddie Castillo LPN

## 2020-08-24 ENCOUNTER — READMISSION MANAGEMENT (OUTPATIENT)
Dept: CALL CENTER | Facility: HOSPITAL | Age: 80
End: 2020-08-24

## 2020-08-24 NOTE — OUTREACH NOTE
Sepsis Week 4 Survey      Responses   [facility] patient discharged from?  [facility]   COVID-19 Test Status  Confirmed   Does the patient have one of the following disease processes/diagnoses(primary or secondary)?  Sepsis   Week 4 attempt successful?  Yes   Call start time  1138   Call end time  1144   Discharge diagnosis  Sepsis due to UTI   Meds reviewed with patient/caregiver?  Yes   Is the patient having any side effects they believe may be caused by any medication additions or changes?  No   Is the patient taking all medications as directed (includes completed medication regime)?  Yes   Has the patient kept scheduled appointments due by today?  Yes   Comments  States saw her PCP today   Is the patient still receiving Home Health Services?  N/A   Pulse Ox monitoring  Intermittent   O2 Sat comments  They are running 95%.  S   What is the patient's perception of their health status since discharge?  Improving   Is the patient/caregiver able to teach back Sepsis?  S - Shivering,fever or very cold, E - Extreme pain or generalized discomfort (worst ever,especially abdomen), S - Sleepy, difficult to arouse,confused, S - Short of breath   Nursing interventions  Nurse provided reassurance to patient   Is patient/caregiver able to teach back steps to recovery at home?  Rest and regain strength, Eat a balanced diet   Is the patient/caregiver able to teach back signs and symptoms of worsening condition:  Fever, Hyperthermia, Rapid heart rate (>90), Shortness of breath/rapid respiratory rate, Altered mental status(confusion/coma), Edema   Is the patient/caregiver able to teach back the hierarchy of who to call/visit for symptoms/problems? PCP, Specialist, Home health nurse, Urgent Care, ED, 911  Yes   Additional teach back comments  States she is doing better.  States she does get tired at times but feels she is improving.  She saw her PCP today and her BP was good and her blood sugars this am were 140s.     Week 4 call  completed?  Yes   Would the patient like one additional call?  No   Graduated  Yes   Did the patient feel the follow up calls were helpful during their recovery period?  Yes   Was the number of calls appropriate?  Yes   Wrap up additional comments  Denies questions or needs at this time          Mariel Knox LPN

## 2020-10-23 ENCOUNTER — HOSPITAL ENCOUNTER (OUTPATIENT)
Dept: MAMMOGRAPHY | Facility: HOSPITAL | Age: 80
Discharge: HOME | End: 2020-10-23
Admitting: INTERNAL MEDICINE

## 2020-10-23 DIAGNOSIS — Z12.31 VISIT FOR SCREENING MAMMOGRAM: ICD-10-CM

## 2020-10-23 PROCEDURE — 77063 BREAST TOMOSYNTHESIS BI: CPT

## 2020-10-23 PROCEDURE — 77067 SCR MAMMO BI INCL CAD: CPT | Performed by: RADIOLOGY

## 2020-10-23 PROCEDURE — 77063 BREAST TOMOSYNTHESIS BI: CPT | Performed by: RADIOLOGY

## 2020-10-23 PROCEDURE — 77067 SCR MAMMO BI INCL CAD: CPT

## 2021-01-05 ENCOUNTER — TRANSCRIBE ORDERS (OUTPATIENT)
Dept: ADMINISTRATIVE | Facility: HOSPITAL | Age: 81
End: 2021-01-05

## 2021-01-05 DIAGNOSIS — R10.9 ABDOMINAL PAIN, UNSPECIFIED ABDOMINAL LOCATION: Primary | ICD-10-CM

## 2021-01-13 ENCOUNTER — HOSPITAL ENCOUNTER (OUTPATIENT)
Dept: ULTRASOUND IMAGING | Facility: HOSPITAL | Age: 81
Discharge: HOME | End: 2021-01-13
Admitting: INTERNAL MEDICINE

## 2021-01-13 DIAGNOSIS — R10.9 ABDOMINAL PAIN, UNSPECIFIED ABDOMINAL LOCATION: ICD-10-CM

## 2021-01-13 PROCEDURE — 76700 US EXAM ABDOM COMPLETE: CPT

## 2021-01-13 PROCEDURE — 76700 US EXAM ABDOM COMPLETE: CPT | Performed by: RADIOLOGY

## 2021-02-17 ENCOUNTER — TRANSCRIBE ORDERS (OUTPATIENT)
Dept: ADMINISTRATIVE | Facility: HOSPITAL | Age: 81
End: 2021-02-17

## 2021-02-17 DIAGNOSIS — N18.30 STAGE 3 CHRONIC KIDNEY DISEASE, UNSPECIFIED WHETHER STAGE 3A OR 3B CKD (HCC): Primary | ICD-10-CM

## 2021-03-02 ENCOUNTER — HOSPITAL ENCOUNTER (OUTPATIENT)
Dept: ULTRASOUND IMAGING | Facility: HOSPITAL | Age: 81
Discharge: HOME | End: 2021-03-02
Admitting: INTERNAL MEDICINE

## 2021-03-02 DIAGNOSIS — N18.30 STAGE 3 CHRONIC KIDNEY DISEASE, UNSPECIFIED WHETHER STAGE 3A OR 3B CKD (HCC): ICD-10-CM

## 2021-03-02 PROCEDURE — 76700 US EXAM ABDOM COMPLETE: CPT

## 2021-03-02 PROCEDURE — 76700 US EXAM ABDOM COMPLETE: CPT | Performed by: RADIOLOGY

## 2021-03-09 ENCOUNTER — OFFICE VISIT (OUTPATIENT)
Dept: SURGERY | Facility: CLINIC | Age: 81
End: 2021-03-09

## 2021-03-09 VITALS — HEIGHT: 67 IN | WEIGHT: 200 LBS | BODY MASS INDEX: 31.39 KG/M2

## 2021-03-09 DIAGNOSIS — K80.20 GALLSTONES: Primary | ICD-10-CM

## 2021-03-09 PROCEDURE — 99203 OFFICE O/P NEW LOW 30 MIN: CPT | Performed by: SURGERY

## 2021-03-09 RX ORDER — FESOTERODINE FUMARATE 8 MG/1
8 TABLET, FILM COATED, EXTENDED RELEASE ORAL DAILY
Status: ON HOLD | COMMUNITY
Start: 2021-03-02 | End: 2021-07-14

## 2021-03-09 RX ORDER — OMEPRAZOLE 40 MG/1
40 CAPSULE, DELAYED RELEASE ORAL DAILY PRN
COMMUNITY
Start: 2020-12-30

## 2021-03-09 RX ORDER — LOSARTAN POTASSIUM 25 MG/1
25 TABLET ORAL DAILY
Status: ON HOLD | COMMUNITY
End: 2021-08-12

## 2021-03-09 RX ORDER — POTASSIUM CHLORIDE 20MEQ/15ML
LIQUID (ML) ORAL DAILY
Status: ON HOLD | COMMUNITY
End: 2021-08-12

## 2021-03-09 NOTE — PROGRESS NOTES
Leila Justin is a 80 y.o. female.     Chief Complaint: gallstones    History of Present Illness She is a 79 yo who has had some abdominal pain and nausea for a few months. There is epigastric pressure after eating. She has gallstones on US.    The following portions of the patient's history were reviewed and updated as appropriate: current medications, past family history, past medical history, past social history, past surgical history and problem list.    Review of Systems   Constitutional: Negative for activity change, appetite change, chills, fever and unexpected weight change.   HENT: Negative for congestion, facial swelling and sore throat.    Eyes: Negative for photophobia and visual disturbance.   Respiratory: Negative for chest tightness, shortness of breath and wheezing.    Cardiovascular: Negative for chest pain, palpitations and leg swelling.   Gastrointestinal: Positive for abdominal pain. Negative for abdominal distention, anal bleeding, blood in stool, constipation, diarrhea, nausea, rectal pain and vomiting.   Endocrine: Negative for cold intolerance, heat intolerance, polydipsia and polyuria.   Genitourinary: Negative for difficulty urinating, dysuria, flank pain and urgency.   Musculoskeletal: Negative for back pain and myalgias.   Skin: Negative for rash and wound.   Allergic/Immunologic: Negative for immunocompromised state.   Neurological: Negative for dizziness, seizures, syncope, light-headedness, numbness and headaches.   Hematological: Negative for adenopathy. Does not bruise/bleed easily.   Psychiatric/Behavioral: Negative for behavioral problems and confusion. The patient is not nervous/anxious.        Objective   Physical Exam  Vitals reviewed.   Constitutional:       General: She is not in acute distress.     Appearance: She is well-developed. She is not ill-appearing.   HENT:      Head: Normocephalic. No laceration. Hair is normal.      Right Ear: Hearing and ear canal  normal.      Left Ear: Hearing and ear canal normal.      Nose: Nose normal.      Right Sinus: No maxillary sinus tenderness or frontal sinus tenderness.      Left Sinus: No maxillary sinus tenderness or frontal sinus tenderness.   Eyes:      General: Lids are normal.      Conjunctiva/sclera: Conjunctivae normal.      Pupils: Pupils are equal, round, and reactive to light.   Neck:      Thyroid: No thyroid mass or thyromegaly.      Vascular: No JVD.      Trachea: No tracheal tenderness or tracheal deviation.   Cardiovascular:      Rate and Rhythm: Normal rate and regular rhythm.      Heart sounds: No murmur. No gallop.    Pulmonary:      Effort: Pulmonary effort is normal.      Breath sounds: Normal breath sounds. No stridor. No wheezing.   Chest:      Chest wall: No tenderness.   Abdominal:      General: Bowel sounds are normal. There is no distension.      Palpations: Abdomen is soft. There is no mass.      Tenderness: There is no abdominal tenderness. There is no guarding or rebound.      Hernia: No hernia is present.   Musculoskeletal:         General: No deformity.      Cervical back: Normal range of motion.   Lymphadenopathy:      Cervical: No cervical adenopathy.      Upper Body:      Right upper body: No supraclavicular adenopathy.      Left upper body: No supraclavicular adenopathy.   Skin:     General: Skin is warm and dry.      Coloration: Skin is not pale.      Findings: No erythema or rash.   Neurological:      Mental Status: She is alert and oriented to person, place, and time.      Motor: No abnormal muscle tone.   Psychiatric:         Behavior: Behavior normal.         Thought Content: Thought content normal.         Assessment/Plan   Diagnoses and all orders for this visit:    1. Gallstones (Primary)    lap daniel

## 2021-03-19 ENCOUNTER — TELEPHONE (OUTPATIENT)
Dept: SURGERY | Facility: CLINIC | Age: 81
End: 2021-03-19

## 2021-03-19 DIAGNOSIS — Z01.818 PREOP TESTING: Primary | ICD-10-CM

## 2021-03-19 NOTE — TELEPHONE ENCOUNTER
PAT 3/24 Wednesday @ 245 with Covid testing after  Patient stop BT on 3/21  Surgery Friday 3/26 @ 730 am. Patient needs to be NPO for surgery and have a  with her.

## 2021-03-24 ENCOUNTER — APPOINTMENT (OUTPATIENT)
Dept: PREADMISSION TESTING | Facility: HOSPITAL | Age: 81
End: 2021-03-24

## 2021-03-24 ENCOUNTER — LAB (OUTPATIENT)
Dept: LAB | Facility: HOSPITAL | Age: 81
End: 2021-03-24

## 2021-03-24 DIAGNOSIS — Z01.818 PREOP TESTING: ICD-10-CM

## 2021-03-24 LAB
ANION GAP SERPL CALCULATED.3IONS-SCNC: 12 MMOL/L (ref 5–15)
BASOPHILS # BLD AUTO: 0.05 10*3/MM3 (ref 0–0.2)
BASOPHILS # BLD MANUAL: 0.07 10*3/MM3 (ref 0–0.2)
BASOPHILS NFR BLD AUTO: 0.8 % (ref 0–1.5)
BASOPHILS NFR BLD AUTO: 1 % (ref 0–1.5)
BUN SERPL-MCNC: 34 MG/DL (ref 8–23)
BUN/CREAT SERPL: 26.8 (ref 7–25)
CALCIUM SPEC-SCNC: 10.4 MG/DL (ref 8.6–10.5)
CHLORIDE SERPL-SCNC: 100 MMOL/L (ref 98–107)
CO2 SERPL-SCNC: 24 MMOL/L (ref 22–29)
CREAT SERPL-MCNC: 1.27 MG/DL (ref 0.57–1)
DEPRECATED RDW RBC AUTO: 44.5 FL (ref 37–54)
EOSINOPHIL # BLD AUTO: 0.16 10*3/MM3 (ref 0–0.4)
EOSINOPHIL # BLD MANUAL: 0.13 10*3/MM3 (ref 0–0.4)
EOSINOPHIL NFR BLD AUTO: 2.5 % (ref 0.3–6.2)
EOSINOPHIL NFR BLD MANUAL: 2 % (ref 0.3–6.2)
ERYTHROCYTE [DISTWIDTH] IN BLOOD BY AUTOMATED COUNT: 13.2 % (ref 12.3–15.4)
GFR SERPL CREATININE-BSD FRML MDRD: 40 ML/MIN/1.73
GLUCOSE SERPL-MCNC: 238 MG/DL (ref 65–99)
HCT VFR BLD AUTO: 40.9 % (ref 34–46.6)
HGB BLD-MCNC: 12.7 G/DL (ref 12–15.9)
IMM GRANULOCYTES # BLD AUTO: 0.03 10*3/MM3 (ref 0–0.05)
IMM GRANULOCYTES NFR BLD AUTO: 0.5 % (ref 0–0.5)
LYMPHOCYTES # BLD AUTO: 1.74 10*3/MM3 (ref 0.7–3.1)
LYMPHOCYTES # BLD MANUAL: 2.02 10*3/MM3 (ref 0.7–3.1)
LYMPHOCYTES NFR BLD AUTO: 26.8 % (ref 19.6–45.3)
LYMPHOCYTES NFR BLD MANUAL: 3 % (ref 5–12)
LYMPHOCYTES NFR BLD MANUAL: 31 % (ref 19.6–45.3)
MCH RBC QN AUTO: 28.5 PG (ref 26.6–33)
MCHC RBC AUTO-ENTMCNC: 31.1 G/DL (ref 31.5–35.7)
MCV RBC AUTO: 91.9 FL (ref 79–97)
MONOCYTES # BLD AUTO: 0.2 10*3/MM3 (ref 0.1–0.9)
MONOCYTES # BLD AUTO: 0.63 10*3/MM3 (ref 0.1–0.9)
MONOCYTES NFR BLD AUTO: 9.7 % (ref 5–12)
NEUTROPHILS # BLD AUTO: 4.1 10*3/MM3 (ref 1.7–7)
NEUTROPHILS NFR BLD AUTO: 3.89 10*3/MM3 (ref 1.7–7)
NEUTROPHILS NFR BLD AUTO: 59.7 % (ref 42.7–76)
NEUTROPHILS NFR BLD MANUAL: 63 % (ref 42.7–76)
NRBC BLD AUTO-RTO: 0 /100 WBC (ref 0–0.2)
PLAT MORPH BLD: NORMAL
PLATELET # BLD AUTO: 200 10*3/MM3 (ref 140–450)
PMV BLD AUTO: 10.9 FL (ref 6–12)
POTASSIUM SERPL-SCNC: 5 MMOL/L (ref 3.5–5.2)
RBC # BLD AUTO: 4.45 10*6/MM3 (ref 3.77–5.28)
RBC MORPH BLD: NORMAL
SODIUM SERPL-SCNC: 136 MMOL/L (ref 136–145)
WBC # BLD AUTO: 6.5 10*3/MM3 (ref 3.4–10.8)

## 2021-03-24 PROCEDURE — C9803 HOPD COVID-19 SPEC COLLECT: HCPCS

## 2021-03-24 PROCEDURE — 85025 COMPLETE CBC W/AUTO DIFF WBC: CPT

## 2021-03-24 PROCEDURE — U0004 COV-19 TEST NON-CDC HGH THRU: HCPCS

## 2021-03-24 PROCEDURE — U0005 INFEC AGEN DETEC AMPLI PROBE: HCPCS

## 2021-03-24 PROCEDURE — 80048 BASIC METABOLIC PNL TOTAL CA: CPT

## 2021-03-24 PROCEDURE — 36415 COLL VENOUS BLD VENIPUNCTURE: CPT

## 2021-03-24 PROCEDURE — 85007 BL SMEAR W/DIFF WBC COUNT: CPT

## 2021-03-24 NOTE — DISCHARGE INSTRUCTIONS
0730---3/26/21   ARRIVAL TIME    TAKE the following medications the morning of surgery:  All heart or blood pressure medications    HOLD all diabetic medications the morning of surgery as ordered by physician.    Please discontinue all blood thinners and anticoagulants (except aspirin) prior to surgery as per your surgeon and cardiologist instructions.  Aspirin may be continued up to the day prior to surgery.     CHLORHEXIDINE CLOTHS GIVEN WITH INSTRUCTIONS AND FORM TO RETURN TO HOSPITAL, IF APPLICABLE.    General Instructions:  · Do not eat or drink after midnight: includes water, mints, or gum. You may brush your teeth.  Dental appliances that are removable must be taken out day of surgery.  · Do not smoke, chew tobacco, or drink alcohol.  · Bring medications in original bottles, any inhalers and if applicable your C-PAP/BI-PAP machine.  · Bring any papers given to you in the doctor's office.  · Wear clean comfortable clothes and socks.  · Do not wear contact lenses or make-up. Bring a case for your glasses if applicable.  · Bring crutches or walker if applicable.  · Leave all other valuables and jewelry at home.    If you were given a blood bank ID arm band remember to bring it with you the day of surgery.    Preventing a Surgical Site Infection:  Shower the night before surgery (unless instructed other wise) using a fresh bar of anti-bacterial soap (such as Dial) and clean washcloth. Dry with a clean towel and dress in clean clothing.  For 2 to 3 days before surgery, avoid shaving with a razor near where you will have surgery because the razor can irritate skin and make it easier to develop an infection. Ask your surgeon if you will be receiving antibiotics prior to surgery.  Make sure you, your family, and all healthcare providers clear their hands with soap and water or an alcohol-based hand  before caring for you or your wound.  If at all possible, quit smoking as many days before surgery as you  can.    Day of surgery:  Upon arrival, a Pre-op nurse and Anesthesiologist will review your health history, obtain vital signs, and answer questions you may have. The only belongings needed at this time will be your home medications and if applicable your C-PAP/BI-PAP machine. If you are staying overnight your family can leave the rest of your belongings in the car and bring them to your room later. A Pre-op nurse will start an IV and you may receive medication in preparation for surgery, including something to help you relax. Your family will be able to see you in the Pre-op area. While you are in surgery your family should notify the waiting room  if they leave the waiting room area and provide a contact phone number.    Please be aware that surgery does come with discomfort. We want to make every effort to control your discomfort so please discuss any uncontrolled symptoms with your nurse. Your doctor will most likely have prescribed pain medications.    If you are going home after surgery you will receive individualized written care instructions before being discharged. A responsible adult must drive you to and from the hospital on the day of surgery and stay with you for 24 hours.    If you are staying overnight following surgery, you will be transported to your hospital room following the recovery period.  [facility] has all private rooms.    If you have any questions please call Pre-Admission Testing at [phone].  Deductibles and co-payments are collected on the day of service. Please be prepared to pay the required co-pay, deductible or deposit on the day of service as defined by your plan.    A RESPONSIBLE PERSON MUST REMAIN IN THE WAITING ROOM DURING YOUR PROCEDURE AND A RESPONSIBLE  MUST BE AVAILABLE UPON YOUR DISCHARGE.

## 2021-03-25 LAB — SARS-COV-2 RNA NOSE QL NAA+PROBE: NOT DETECTED

## 2021-03-26 ENCOUNTER — HOSPITAL ENCOUNTER (OUTPATIENT)
Facility: HOSPITAL | Age: 81
Setting detail: HOSPITAL OUTPATIENT SURGERY
Discharge: HOME | End: 2021-03-26
Attending: SURGERY | Admitting: SURGERY

## 2021-03-26 ENCOUNTER — ANESTHESIA (OUTPATIENT)
Dept: PERIOP | Facility: HOSPITAL | Age: 81
End: 2021-03-26

## 2021-03-26 ENCOUNTER — ANESTHESIA EVENT (OUTPATIENT)
Dept: PERIOP | Facility: HOSPITAL | Age: 81
End: 2021-03-26

## 2021-03-26 VITALS
DIASTOLIC BLOOD PRESSURE: 83 MMHG | HEART RATE: 70 BPM | OXYGEN SATURATION: 100 % | HEIGHT: 67 IN | RESPIRATION RATE: 20 BRPM | SYSTOLIC BLOOD PRESSURE: 148 MMHG | BODY MASS INDEX: 31.23 KG/M2 | TEMPERATURE: 97.8 F | WEIGHT: 199 LBS

## 2021-03-26 DIAGNOSIS — K80.20 GALLSTONES: ICD-10-CM

## 2021-03-26 LAB
GLUCOSE BLDC GLUCOMTR-MCNC: 127 MG/DL (ref 70–130)
GLUCOSE BLDC GLUCOMTR-MCNC: 153 MG/DL (ref 70–130)

## 2021-03-26 PROCEDURE — 25010000002 NEOSTIGMINE 10 MG/10ML SOLUTION: Performed by: NURSE ANESTHETIST, CERTIFIED REGISTERED

## 2021-03-26 PROCEDURE — 47562 LAPAROSCOPIC CHOLECYSTECTOMY: CPT | Performed by: SURGERY

## 2021-03-26 PROCEDURE — 25010000002 PROPOFOL 10 MG/ML EMULSION: Performed by: NURSE ANESTHETIST, CERTIFIED REGISTERED

## 2021-03-26 PROCEDURE — 25010000002 ONDANSETRON PER 1 MG: Performed by: NURSE ANESTHETIST, CERTIFIED REGISTERED

## 2021-03-26 PROCEDURE — 82962 GLUCOSE BLOOD TEST: CPT

## 2021-03-26 PROCEDURE — 25010000002 FENTANYL CITRATE (PF) 100 MCG/2ML SOLUTION: Performed by: NURSE ANESTHETIST, CERTIFIED REGISTERED

## 2021-03-26 PROCEDURE — 88304 TISSUE EXAM BY PATHOLOGIST: CPT | Performed by: SURGERY

## 2021-03-26 DEVICE — LIGACLIP 10-M/L, 10MM ENDOSCOPIC ROTATING MULTIPLE CLIP APPLIERS
Type: IMPLANTABLE DEVICE | Site: ABDOMEN | Status: FUNCTIONAL
Brand: LIGACLIP

## 2021-03-26 RX ORDER — FENTANYL CITRATE 50 UG/ML
INJECTION, SOLUTION INTRAMUSCULAR; INTRAVENOUS AS NEEDED
Status: DISCONTINUED | OUTPATIENT
Start: 2021-03-26 | End: 2021-03-26 | Stop reason: SURG

## 2021-03-26 RX ORDER — PROPOFOL 10 MG/ML
VIAL (ML) INTRAVENOUS AS NEEDED
Status: DISCONTINUED | OUTPATIENT
Start: 2021-03-26 | End: 2021-03-26 | Stop reason: SURG

## 2021-03-26 RX ORDER — SODIUM CHLORIDE, SODIUM LACTATE, POTASSIUM CHLORIDE, CALCIUM CHLORIDE 600; 310; 30; 20 MG/100ML; MG/100ML; MG/100ML; MG/100ML
125 INJECTION, SOLUTION INTRAVENOUS ONCE
Status: COMPLETED | OUTPATIENT
Start: 2021-03-26 | End: 2021-03-26

## 2021-03-26 RX ORDER — SODIUM CHLORIDE 9 MG/ML
INJECTION, SOLUTION INTRAVENOUS AS NEEDED
Status: DISCONTINUED | OUTPATIENT
Start: 2021-03-26 | End: 2021-03-26 | Stop reason: HOSPADM

## 2021-03-26 RX ORDER — SODIUM CHLORIDE, SODIUM LACTATE, POTASSIUM CHLORIDE, CALCIUM CHLORIDE 600; 310; 30; 20 MG/100ML; MG/100ML; MG/100ML; MG/100ML
100 INJECTION, SOLUTION INTRAVENOUS ONCE AS NEEDED
Status: DISCONTINUED | OUTPATIENT
Start: 2021-03-26 | End: 2021-03-26 | Stop reason: HOSPADM

## 2021-03-26 RX ORDER — FENTANYL CITRATE 50 UG/ML
50 INJECTION, SOLUTION INTRAMUSCULAR; INTRAVENOUS
Status: DISCONTINUED | OUTPATIENT
Start: 2021-03-26 | End: 2021-03-26 | Stop reason: HOSPADM

## 2021-03-26 RX ORDER — DROPERIDOL 2.5 MG/ML
0.62 INJECTION, SOLUTION INTRAMUSCULAR; INTRAVENOUS ONCE AS NEEDED
Status: DISCONTINUED | OUTPATIENT
Start: 2021-03-26 | End: 2021-03-26 | Stop reason: HOSPADM

## 2021-03-26 RX ORDER — OXYCODONE HYDROCHLORIDE AND ACETAMINOPHEN 5; 325 MG/1; MG/1
1 TABLET ORAL ONCE AS NEEDED
Status: DISCONTINUED | OUTPATIENT
Start: 2021-03-26 | End: 2021-03-26 | Stop reason: HOSPADM

## 2021-03-26 RX ORDER — HYDROCODONE BITARTRATE AND ACETAMINOPHEN 5; 325 MG/1; MG/1
1 TABLET ORAL EVERY 4 HOURS PRN
Qty: 12 TABLET | Refills: 0 | Status: SHIPPED | OUTPATIENT
Start: 2021-03-26 | End: 2021-04-05

## 2021-03-26 RX ORDER — MIDAZOLAM HYDROCHLORIDE 1 MG/ML
1 INJECTION INTRAMUSCULAR; INTRAVENOUS
Status: DISCONTINUED | OUTPATIENT
Start: 2021-03-26 | End: 2021-03-26 | Stop reason: HOSPADM

## 2021-03-26 RX ORDER — SODIUM CHLORIDE 0.9 % (FLUSH) 0.9 %
10 SYRINGE (ML) INJECTION EVERY 12 HOURS SCHEDULED
Status: DISCONTINUED | OUTPATIENT
Start: 2021-03-26 | End: 2021-03-26 | Stop reason: HOSPADM

## 2021-03-26 RX ORDER — SODIUM CHLORIDE, SODIUM LACTATE, POTASSIUM CHLORIDE, CALCIUM CHLORIDE 600; 310; 30; 20 MG/100ML; MG/100ML; MG/100ML; MG/100ML
INJECTION, SOLUTION INTRAVENOUS CONTINUOUS PRN
Status: DISCONTINUED | OUTPATIENT
Start: 2021-03-26 | End: 2021-03-26 | Stop reason: SURG

## 2021-03-26 RX ORDER — ONDANSETRON 2 MG/ML
INJECTION INTRAMUSCULAR; INTRAVENOUS AS NEEDED
Status: DISCONTINUED | OUTPATIENT
Start: 2021-03-26 | End: 2021-03-26 | Stop reason: SURG

## 2021-03-26 RX ORDER — MIDAZOLAM HYDROCHLORIDE 1 MG/ML
0.5 INJECTION INTRAMUSCULAR; INTRAVENOUS
Status: DISCONTINUED | OUTPATIENT
Start: 2021-03-26 | End: 2021-03-26 | Stop reason: HOSPADM

## 2021-03-26 RX ORDER — HYDROCODONE BITARTRATE AND ACETAMINOPHEN 5; 325 MG/1; MG/1
1 TABLET ORAL EVERY 4 HOURS PRN
Status: DISCONTINUED | OUTPATIENT
Start: 2021-03-26 | End: 2021-03-26 | Stop reason: HOSPADM

## 2021-03-26 RX ORDER — MAGNESIUM HYDROXIDE 1200 MG/15ML
LIQUID ORAL AS NEEDED
Status: DISCONTINUED | OUTPATIENT
Start: 2021-03-26 | End: 2021-03-26 | Stop reason: HOSPADM

## 2021-03-26 RX ORDER — SODIUM CHLORIDE 0.9 % (FLUSH) 0.9 %
10 SYRINGE (ML) INJECTION AS NEEDED
Status: DISCONTINUED | OUTPATIENT
Start: 2021-03-26 | End: 2021-03-26 | Stop reason: HOSPADM

## 2021-03-26 RX ORDER — BUPIVACAINE HYDROCHLORIDE AND EPINEPHRINE 5; 5 MG/ML; UG/ML
INJECTION, SOLUTION PERINEURAL AS NEEDED
Status: DISCONTINUED | OUTPATIENT
Start: 2021-03-26 | End: 2021-03-26 | Stop reason: HOSPADM

## 2021-03-26 RX ORDER — ONDANSETRON 2 MG/ML
4 INJECTION INTRAMUSCULAR; INTRAVENOUS AS NEEDED
Status: DISCONTINUED | OUTPATIENT
Start: 2021-03-26 | End: 2021-03-26 | Stop reason: HOSPADM

## 2021-03-26 RX ORDER — ROCURONIUM BROMIDE 10 MG/ML
INJECTION, SOLUTION INTRAVENOUS AS NEEDED
Status: DISCONTINUED | OUTPATIENT
Start: 2021-03-26 | End: 2021-03-26 | Stop reason: SURG

## 2021-03-26 RX ORDER — NEOSTIGMINE METHYLSULFATE 1 MG/ML
INJECTION, SOLUTION INTRAVENOUS AS NEEDED
Status: DISCONTINUED | OUTPATIENT
Start: 2021-03-26 | End: 2021-03-26 | Stop reason: SURG

## 2021-03-26 RX ORDER — FAMOTIDINE 10 MG/ML
INJECTION, SOLUTION INTRAVENOUS AS NEEDED
Status: DISCONTINUED | OUTPATIENT
Start: 2021-03-26 | End: 2021-03-26 | Stop reason: SURG

## 2021-03-26 RX ORDER — IPRATROPIUM BROMIDE AND ALBUTEROL SULFATE 2.5; .5 MG/3ML; MG/3ML
3 SOLUTION RESPIRATORY (INHALATION) ONCE AS NEEDED
Status: DISCONTINUED | OUTPATIENT
Start: 2021-03-26 | End: 2021-03-26 | Stop reason: HOSPADM

## 2021-03-26 RX ORDER — GLYCOPYRROLATE 0.2 MG/ML
INJECTION INTRAMUSCULAR; INTRAVENOUS AS NEEDED
Status: DISCONTINUED | OUTPATIENT
Start: 2021-03-26 | End: 2021-03-26 | Stop reason: SURG

## 2021-03-26 RX ADMIN — SODIUM CHLORIDE, POTASSIUM CHLORIDE, SODIUM LACTATE AND CALCIUM CHLORIDE 125 ML/HR: 600; 310; 30; 20 INJECTION, SOLUTION INTRAVENOUS at 08:03

## 2021-03-26 RX ADMIN — ROCURONIUM BROMIDE 20 MG: 10 SOLUTION INTRAVENOUS at 08:57

## 2021-03-26 RX ADMIN — FAMOTIDINE 20 MG: 10 INJECTION INTRAVENOUS at 08:54

## 2021-03-26 RX ADMIN — SODIUM CHLORIDE, POTASSIUM CHLORIDE, SODIUM LACTATE AND CALCIUM CHLORIDE: 600; 310; 30; 20 INJECTION, SOLUTION INTRAVENOUS at 07:57

## 2021-03-26 RX ADMIN — ONDANSETRON 4 MG: 2 INJECTION INTRAMUSCULAR; INTRAVENOUS at 08:54

## 2021-03-26 RX ADMIN — FENTANYL CITRATE 50 MCG: 50 INJECTION INTRAMUSCULAR; INTRAVENOUS at 09:33

## 2021-03-26 RX ADMIN — FENTANYL CITRATE 100 MCG: 50 INJECTION INTRAMUSCULAR; INTRAVENOUS at 08:54

## 2021-03-26 RX ADMIN — NEOSTIGMINE 3 MG: 1 INJECTION INTRAVENOUS at 09:11

## 2021-03-26 RX ADMIN — GLYCOPYRROLATE 0.4 MG: 0.2 INJECTION, SOLUTION INTRAMUSCULAR; INTRAVENOUS at 09:11

## 2021-03-26 RX ADMIN — PROPOFOL 120 MG: 10 INJECTION, EMULSION INTRAVENOUS at 08:57

## 2021-03-26 RX ADMIN — HYDROCODONE BITARTRATE AND ACETAMINOPHEN 1 TABLET: 5; 325 TABLET ORAL at 10:15

## 2021-03-26 NOTE — ANESTHESIA POSTPROCEDURE EVALUATION
Patient: Rachana Justin    Procedure Summary     Date: 03/26/21 Room / Location: [facility] OR 01 / [facility] OR    Anesthesia Start: 0854 Anesthesia Stop: 0926    Procedure: CHOLECYSTECTOMY LAPAROSCOPIC (REQUESTS MARIVEL NEWMAN) (N/A Abdomen) Diagnosis:       Gallstones      (Gallstones [K80.20])    Surgeons: Keagan Lopez MD Provider: Luis Steward MD    Anesthesia Type: general ASA Status: 3          Anesthesia Type: general    Vitals  No vitals data found for the desired time range.          Post Anesthesia Care and Evaluation    Patient participation: complete - patient participated  Level of consciousness: awake  Pain score: 1  Pain management: satisfactory to patient  Airway patency: patent  Anesthetic complications: No anesthetic complications  PONV Status: none  Cardiovascular status: acceptable  Respiratory status: acceptable  Hydration status: acceptable  Post Neuraxial Block status: Motor and sensory function returned to baselineNo anesthesia care post op

## 2021-03-26 NOTE — ANESTHESIA PROCEDURE NOTES
Airway  Urgency: elective    Date/Time: 3/26/2021 8:58 AM  End Time:3/26/2021 8:58 AM  Airway not difficult    General Information and Staff    Patient location during procedure: OR  CRNA: Fuentes Vogt CRNA    Indications and Patient Condition  Indications for airway management: airway protection    Preoxygenated: yes  MILS maintained throughout  Mask difficulty assessment: 0 - not attempted    Final Airway Details  Final airway type: endotracheal airway      Successful airway: ETT  Cuffed: yes   Successful intubation technique: direct laryngoscopy  Endotracheal tube insertion site: oral  Blade: Cinthya  Blade size: 3  ETT size (mm): 7.0  Cormack-Lehane Classification: grade IIa - partial view of glottis  Placement verified by: chest auscultation, capnometry and palpation of cuff   Cuff volume (mL): 8  Measured from: lips  ETT/EBT  to lips (cm): 22  Number of attempts at approach: 1  Assessment: lips, teeth, and gum same as pre-op and atraumatic intubation

## 2021-03-26 NOTE — ANESTHESIA PREPROCEDURE EVALUATION
Anesthesia Evaluation     Patient summary reviewed and Nursing notes reviewed   no history of anesthetic complications:  NPO Solid Status: > 8 hours  NPO Liquid Status: > 8 hours           Airway   Mallampati: II  TM distance: >3 FB  Neck ROM: full  No difficulty expected  Dental    (+) edentulous    Pulmonary - negative pulmonary ROS    breath sounds clear to auscultation  Cardiovascular   Exercise tolerance: poor (<4 METS)    Rhythm: regular  Rate: normal    (+) hypertension, dysrhythmias Atrial Fib, hyperlipidemia,       Neuro/Psych- negative ROS  GI/Hepatic/Renal/Endo    (+)  GERD,  diabetes mellitus type 2 well controlled,     Musculoskeletal     (+) arthralgias, chronic pain, gait problem, neck stiffness,   Abdominal   (+) scaphoid   Substance History - negative use     OB/GYN          Other   arthritis,      ROS/Med Hx Other: WT loss  GB stones                  Anesthesia Plan    ASA 3     general     intravenous induction     Anesthetic plan, all risks, benefits, and alternatives have been provided, discussed and informed consent has been obtained with: patient.  Use of blood products discussed with consented to blood products.   Plan discussed with CRNA.

## 2021-03-29 LAB — LAB AP CASE REPORT: NORMAL

## 2021-04-01 ENCOUNTER — TRANSCRIBE ORDERS (OUTPATIENT)
Dept: ADMINISTRATIVE | Facility: HOSPITAL | Age: 81
End: 2021-04-01

## 2021-04-01 DIAGNOSIS — N17.9 ACUTE RENAL FAILURE, UNSPECIFIED ACUTE RENAL FAILURE TYPE (HCC): Primary | ICD-10-CM

## 2021-04-05 ENCOUNTER — OFFICE VISIT (OUTPATIENT)
Dept: SURGERY | Facility: CLINIC | Age: 81
End: 2021-04-05

## 2021-04-05 VITALS — WEIGHT: 199 LBS | HEIGHT: 67 IN | BODY MASS INDEX: 31.23 KG/M2

## 2021-04-05 DIAGNOSIS — Z90.49 STATUS POST LAPAROSCOPIC CHOLECYSTECTOMY: Primary | ICD-10-CM

## 2021-04-05 PROCEDURE — 99024 POSTOP FOLLOW-UP VISIT: CPT | Performed by: SURGERY

## 2021-04-05 RX ORDER — POTASSIUM CHLORIDE 1500 MG/1
20 TABLET, FILM COATED, EXTENDED RELEASE ORAL 3 TIMES WEEKLY
COMMUNITY
Start: 2021-03-03

## 2021-05-03 ENCOUNTER — HOSPITAL ENCOUNTER (OUTPATIENT)
Dept: ULTRASOUND IMAGING | Facility: HOSPITAL | Age: 81
Discharge: HOME | End: 2021-05-03
Admitting: INTERNAL MEDICINE

## 2021-05-03 DIAGNOSIS — N17.9 ACUTE RENAL FAILURE, UNSPECIFIED ACUTE RENAL FAILURE TYPE (HCC): ICD-10-CM

## 2021-05-03 PROCEDURE — 76775 US EXAM ABDO BACK WALL LIM: CPT | Performed by: RADIOLOGY

## 2021-05-03 PROCEDURE — 76775 US EXAM ABDO BACK WALL LIM: CPT

## 2021-05-28 PROCEDURE — 36415 COLL VENOUS BLD VENIPUNCTURE: CPT

## 2021-05-28 PROCEDURE — 99284 EMERGENCY DEPT VISIT MOD MDM: CPT

## 2021-05-29 ENCOUNTER — HOSPITAL ENCOUNTER (EMERGENCY)
Facility: HOSPITAL | Age: 81
Discharge: HOME | End: 2021-05-29
Attending: EMERGENCY MEDICINE | Admitting: EMERGENCY MEDICINE

## 2021-05-29 ENCOUNTER — APPOINTMENT (OUTPATIENT)
Dept: CT IMAGING | Facility: HOSPITAL | Age: 81
End: 2021-05-29

## 2021-05-29 VITALS
BODY MASS INDEX: 31.39 KG/M2 | RESPIRATION RATE: 14 BRPM | OXYGEN SATURATION: 99 % | HEIGHT: 67 IN | TEMPERATURE: 98 F | SYSTOLIC BLOOD PRESSURE: 148 MMHG | DIASTOLIC BLOOD PRESSURE: 79 MMHG | WEIGHT: 200 LBS | HEART RATE: 70 BPM

## 2021-05-29 DIAGNOSIS — A49.9 BACTERIAL UTI: ICD-10-CM

## 2021-05-29 DIAGNOSIS — K52.9 COLITIS: ICD-10-CM

## 2021-05-29 DIAGNOSIS — N39.0 BACTERIAL UTI: ICD-10-CM

## 2021-05-29 DIAGNOSIS — R10.84 GENERALIZED ABDOMINAL PAIN: Primary | ICD-10-CM

## 2021-05-29 DIAGNOSIS — C80.1 MALIGNANCY (HCC): ICD-10-CM

## 2021-05-29 DIAGNOSIS — I10 HYPERTENSION, UNSPECIFIED TYPE: ICD-10-CM

## 2021-05-29 LAB
6-ACETYL MORPHINE: NEGATIVE
ALBUMIN SERPL-MCNC: 4.02 G/DL (ref 3.5–5.2)
ALBUMIN/GLOB SERPL: 1.3 G/DL
ALP SERPL-CCNC: 197 U/L (ref 39–117)
ALT SERPL W P-5'-P-CCNC: 10 U/L (ref 1–33)
AMPHET+METHAMPHET UR QL: NEGATIVE
ANION GAP SERPL CALCULATED.3IONS-SCNC: 14.2 MMOL/L (ref 5–15)
AST SERPL-CCNC: 21 U/L (ref 1–32)
BACTERIA UR QL AUTO: ABNORMAL /HPF
BARBITURATES UR QL SCN: NEGATIVE
BASOPHILS # BLD AUTO: 0.04 10*3/MM3 (ref 0–0.2)
BASOPHILS NFR BLD AUTO: 0.5 % (ref 0–1.5)
BENZODIAZ UR QL SCN: NEGATIVE
BILIRUB SERPL-MCNC: 1.5 MG/DL (ref 0–1.2)
BILIRUB UR QL STRIP: NEGATIVE
BUN SERPL-MCNC: 17 MG/DL (ref 8–23)
BUN/CREAT SERPL: 14.8 (ref 7–25)
BUPRENORPHINE SERPL-MCNC: NEGATIVE NG/ML
CALCIUM SPEC-SCNC: 10.9 MG/DL (ref 8.6–10.5)
CANNABINOIDS SERPL QL: NEGATIVE
CHLORIDE SERPL-SCNC: 100 MMOL/L (ref 98–107)
CK SERPL-CCNC: 85 U/L (ref 20–180)
CLARITY UR: CLEAR
CO2 SERPL-SCNC: 22.8 MMOL/L (ref 22–29)
COCAINE UR QL: NEGATIVE
COLOR UR: YELLOW
CREAT SERPL-MCNC: 1.15 MG/DL (ref 0.57–1)
CRP SERPL-MCNC: <0.3 MG/DL (ref 0–0.5)
DEPRECATED RDW RBC AUTO: 44.9 FL (ref 37–54)
EOSINOPHIL # BLD AUTO: 0.25 10*3/MM3 (ref 0–0.4)
EOSINOPHIL NFR BLD AUTO: 3.4 % (ref 0.3–6.2)
ERYTHROCYTE [DISTWIDTH] IN BLOOD BY AUTOMATED COUNT: 13.8 % (ref 12.3–15.4)
ERYTHROCYTE [SEDIMENTATION RATE] IN BLOOD: 20 MM/HR (ref 0–30)
ETHANOL BLD-MCNC: <10 MG/DL (ref 0–10)
ETHANOL UR QL: <0.01 %
FLUAV RNA RESP QL NAA+PROBE: NOT DETECTED
FLUBV RNA RESP QL NAA+PROBE: NOT DETECTED
GFR SERPL CREATININE-BSD FRML MDRD: 45 ML/MIN/1.73
GLOBULIN UR ELPH-MCNC: 3.1 GM/DL
GLUCOSE SERPL-MCNC: 136 MG/DL (ref 65–99)
GLUCOSE UR STRIP-MCNC: ABNORMAL MG/DL
HCT VFR BLD AUTO: 40 % (ref 34–46.6)
HGB BLD-MCNC: 12.3 G/DL (ref 12–15.9)
HGB UR QL STRIP.AUTO: NEGATIVE
HYALINE CASTS UR QL AUTO: ABNORMAL /LPF
IMM GRANULOCYTES # BLD AUTO: 0.03 10*3/MM3 (ref 0–0.05)
IMM GRANULOCYTES NFR BLD AUTO: 0.4 % (ref 0–0.5)
KETONES UR QL STRIP: ABNORMAL
LEUKOCYTE ESTERASE UR QL STRIP.AUTO: ABNORMAL
LIPASE SERPL-CCNC: 13 U/L (ref 13–60)
LYMPHOCYTES # BLD AUTO: 1.44 10*3/MM3 (ref 0.7–3.1)
LYMPHOCYTES NFR BLD AUTO: 19.7 % (ref 19.6–45.3)
MAGNESIUM SERPL-MCNC: 1.7 MG/DL (ref 1.6–2.4)
MCH RBC QN AUTO: 27.5 PG (ref 26.6–33)
MCHC RBC AUTO-ENTMCNC: 30.8 G/DL (ref 31.5–35.7)
MCV RBC AUTO: 89.3 FL (ref 79–97)
METHADONE UR QL SCN: NEGATIVE
MONOCYTES # BLD AUTO: 0.6 10*3/MM3 (ref 0.1–0.9)
MONOCYTES NFR BLD AUTO: 8.2 % (ref 5–12)
NEUTROPHILS NFR BLD AUTO: 4.96 10*3/MM3 (ref 1.7–7)
NEUTROPHILS NFR BLD AUTO: 67.8 % (ref 42.7–76)
NITRITE UR QL STRIP: NEGATIVE
NRBC BLD AUTO-RTO: 0 /100 WBC (ref 0–0.2)
NT-PROBNP SERPL-MCNC: 3049 PG/ML (ref 0–1800)
OPIATES UR QL: NEGATIVE
OXYCODONE UR QL SCN: NEGATIVE
PCP UR QL SCN: NEGATIVE
PH UR STRIP.AUTO: 7 [PH] (ref 5–8)
PLATELET # BLD AUTO: 187 10*3/MM3 (ref 140–450)
PMV BLD AUTO: 10.7 FL (ref 6–12)
POTASSIUM SERPL-SCNC: 3.9 MMOL/L (ref 3.5–5.2)
PROT SERPL-MCNC: 7.1 G/DL (ref 6–8.5)
PROT UR QL STRIP: ABNORMAL
QT INTERVAL: 470 MS
QTC INTERVAL: 507 MS
RBC # BLD AUTO: 4.48 10*6/MM3 (ref 3.77–5.28)
RBC # UR: ABNORMAL /HPF
REF LAB TEST METHOD: ABNORMAL
SARS-COV-2 RNA RESP QL NAA+PROBE: NOT DETECTED
SODIUM SERPL-SCNC: 137 MMOL/L (ref 136–145)
SP GR UR STRIP: 1.01 (ref 1–1.03)
SQUAMOUS #/AREA URNS HPF: ABNORMAL /HPF
TROPONIN T SERPL-MCNC: <0.01 NG/ML (ref 0–0.03)
UROBILINOGEN UR QL STRIP: ABNORMAL
WBC # BLD AUTO: 7.32 10*3/MM3 (ref 3.4–10.8)
WBC UR QL AUTO: ABNORMAL /HPF

## 2021-05-29 PROCEDURE — 80307 DRUG TEST PRSMV CHEM ANLYZR: CPT | Performed by: EMERGENCY MEDICINE

## 2021-05-29 PROCEDURE — 74176 CT ABD & PELVIS W/O CONTRAST: CPT

## 2021-05-29 PROCEDURE — 86140 C-REACTIVE PROTEIN: CPT | Performed by: EMERGENCY MEDICINE

## 2021-05-29 PROCEDURE — 25010000002 DROPERIDOL PER 5 MG: Performed by: EMERGENCY MEDICINE

## 2021-05-29 PROCEDURE — 82077 ASSAY SPEC XCP UR&BREATH IA: CPT | Performed by: EMERGENCY MEDICINE

## 2021-05-29 PROCEDURE — 87636 SARSCOV2 & INF A&B AMP PRB: CPT | Performed by: EMERGENCY MEDICINE

## 2021-05-29 PROCEDURE — 85652 RBC SED RATE AUTOMATED: CPT | Performed by: EMERGENCY MEDICINE

## 2021-05-29 PROCEDURE — 87186 SC STD MICRODIL/AGAR DIL: CPT | Performed by: EMERGENCY MEDICINE

## 2021-05-29 PROCEDURE — 85025 COMPLETE CBC W/AUTO DIFF WBC: CPT | Performed by: EMERGENCY MEDICINE

## 2021-05-29 PROCEDURE — 80053 COMPREHEN METABOLIC PANEL: CPT | Performed by: EMERGENCY MEDICINE

## 2021-05-29 PROCEDURE — 83735 ASSAY OF MAGNESIUM: CPT | Performed by: EMERGENCY MEDICINE

## 2021-05-29 PROCEDURE — 87086 URINE CULTURE/COLONY COUNT: CPT | Performed by: EMERGENCY MEDICINE

## 2021-05-29 PROCEDURE — 25010000002 CEFTRIAXONE: Performed by: EMERGENCY MEDICINE

## 2021-05-29 PROCEDURE — 83880 ASSAY OF NATRIURETIC PEPTIDE: CPT | Performed by: EMERGENCY MEDICINE

## 2021-05-29 PROCEDURE — 36415 COLL VENOUS BLD VENIPUNCTURE: CPT

## 2021-05-29 PROCEDURE — 87077 CULTURE AEROBIC IDENTIFY: CPT | Performed by: EMERGENCY MEDICINE

## 2021-05-29 PROCEDURE — 93005 ELECTROCARDIOGRAM TRACING: CPT | Performed by: EMERGENCY MEDICINE

## 2021-05-29 PROCEDURE — 96365 THER/PROPH/DIAG IV INF INIT: CPT

## 2021-05-29 PROCEDURE — 25010000002 KETOROLAC TROMETHAMINE PER 15 MG: Performed by: EMERGENCY MEDICINE

## 2021-05-29 PROCEDURE — 82550 ASSAY OF CK (CPK): CPT | Performed by: EMERGENCY MEDICINE

## 2021-05-29 PROCEDURE — 81001 URINALYSIS AUTO W/SCOPE: CPT | Performed by: EMERGENCY MEDICINE

## 2021-05-29 PROCEDURE — 84484 ASSAY OF TROPONIN QUANT: CPT | Performed by: EMERGENCY MEDICINE

## 2021-05-29 PROCEDURE — 96375 TX/PRO/DX INJ NEW DRUG ADDON: CPT

## 2021-05-29 PROCEDURE — 83690 ASSAY OF LIPASE: CPT | Performed by: EMERGENCY MEDICINE

## 2021-05-29 RX ORDER — SODIUM CHLORIDE 0.9 % (FLUSH) 0.9 %
10 SYRINGE (ML) INJECTION AS NEEDED
Status: DISCONTINUED | OUTPATIENT
Start: 2021-05-29 | End: 2021-05-29 | Stop reason: HOSPADM

## 2021-05-29 RX ORDER — CEFDINIR 300 MG/1
300 CAPSULE ORAL 2 TIMES DAILY
Qty: 20 CAPSULE | Refills: 0 | Status: ON HOLD | OUTPATIENT
Start: 2021-05-29 | End: 2021-08-12

## 2021-05-29 RX ORDER — DROPERIDOL 2.5 MG/ML
1.25 INJECTION, SOLUTION INTRAMUSCULAR; INTRAVENOUS ONCE
Status: COMPLETED | OUTPATIENT
Start: 2021-05-29 | End: 2021-05-29

## 2021-05-29 RX ORDER — HYDROCODONE BITARTRATE AND ACETAMINOPHEN 5; 325 MG/1; MG/1
1 TABLET ORAL EVERY 6 HOURS PRN
Qty: 10 TABLET | Refills: 0 | Status: SHIPPED | OUTPATIENT
Start: 2021-05-29 | End: 2021-05-29 | Stop reason: SDUPTHER

## 2021-05-29 RX ORDER — HYDROCODONE BITARTRATE AND ACETAMINOPHEN 5; 325 MG/1; MG/1
1 TABLET ORAL EVERY 6 HOURS PRN
Qty: 10 TABLET | Refills: 0 | Status: ON HOLD | OUTPATIENT
Start: 2021-05-29 | End: 2021-08-12

## 2021-05-29 RX ORDER — ENALAPRILAT 2.5 MG/2ML
1.25 INJECTION INTRAVENOUS ONCE
Status: COMPLETED | OUTPATIENT
Start: 2021-05-29 | End: 2021-05-29

## 2021-05-29 RX ORDER — METRONIDAZOLE 500 MG/1
500 TABLET ORAL 4 TIMES DAILY
Qty: 28 TABLET | Refills: 0 | Status: SHIPPED | OUTPATIENT
Start: 2021-05-29 | End: 2021-06-05

## 2021-05-29 RX ORDER — KETOROLAC TROMETHAMINE 30 MG/ML
30 INJECTION, SOLUTION INTRAMUSCULAR; INTRAVENOUS ONCE
Status: COMPLETED | OUTPATIENT
Start: 2021-05-29 | End: 2021-05-29

## 2021-05-29 RX ADMIN — CEFTRIAXONE 1 G: 1 INJECTION, POWDER, FOR SOLUTION INTRAMUSCULAR; INTRAVENOUS at 04:55

## 2021-05-29 RX ADMIN — SODIUM CHLORIDE 1000 ML: 9 INJECTION, SOLUTION INTRAVENOUS at 02:15

## 2021-05-29 RX ADMIN — DROPERIDOL 1.25 MG: 2.5 INJECTION, SOLUTION INTRAMUSCULAR; INTRAVENOUS at 02:18

## 2021-05-29 RX ADMIN — ENALAPRILAT 1.25 MG: 2.5 INJECTION INTRAVENOUS at 04:50

## 2021-05-29 RX ADMIN — KETOROLAC TROMETHAMINE 30 MG: 30 INJECTION, SOLUTION INTRAMUSCULAR; INTRAVENOUS at 02:16

## 2021-05-31 LAB — BACTERIA SPEC AEROBE CULT: ABNORMAL

## 2021-06-01 NOTE — ED PROVIDER NOTES
Subjective     History provided by:  Patient   used: No    Abdominal Pain  Pain location:  Suprapubic  Pain quality: aching, cramping and dull    Pain radiates to:  Does not radiate  Pain severity:  Mild  Onset quality:  Gradual  Timing:  Constant  Progression:  Unchanged  Chronicity:  New  Context: not alcohol use, not awakening from sleep, not diet changes, not eating, not laxative use, not medication withdrawal, not previous surgeries, not recent illness, not recent sexual activity, not recent travel, not retching, not sick contacts, not suspicious food intake and not trauma    Relieved by:  Nothing  Worsened by:  Nothing  Ineffective treatments:  None tried  Associated symptoms: no anorexia, no belching, no chest pain, no chills, no constipation, no cough, no diarrhea, no dysuria, no fatigue, no fever, no flatus, no hematemesis, no hematochezia, no hematuria, no melena, no nausea, no shortness of breath, no sore throat, no vaginal bleeding, no vaginal discharge and no vomiting    Risk factors: being elderly    Risk factors: no alcohol abuse, no aspirin use, has not had multiple surgeries, no NSAID use, not obese, not pregnant and no recent hospitalization        Review of Systems   Constitutional: Negative for activity change, appetite change, chills, diaphoresis, fatigue and fever.   HENT: Negative for congestion, ear pain and sore throat.    Eyes: Negative for redness.   Respiratory: Negative for cough, chest tightness, shortness of breath and wheezing.    Cardiovascular: Negative for chest pain, palpitations and leg swelling.   Gastrointestinal: Positive for abdominal pain. Negative for anorexia, constipation, diarrhea, flatus, hematemesis, hematochezia, melena, nausea and vomiting.   Genitourinary: Negative for dysuria, hematuria, urgency, vaginal bleeding and vaginal discharge.   Musculoskeletal: Negative for arthralgias, back pain, myalgias and neck pain.   Skin: Negative for pallor,  rash and wound.   Neurological: Negative for dizziness, speech difficulty, weakness and headaches.   Psychiatric/Behavioral: Negative for agitation, behavioral problems, confusion and decreased concentration.   All other systems reviewed and are negative.      Past Medical History:   Diagnosis Date   • Ambulates with cane    • Arthritis    • Atrial fibrillation (CMS/HCC)    • CHF (congestive heart failure) (CMS/HCC)    • Diabetes mellitus (CMS/HCC)    • Gallstones    • GERD (gastroesophageal reflux disease)    • Hyperlipidemia    • Hypertension    • UTI (urinary tract infection)        No Known Allergies    Past Surgical History:   Procedure Laterality Date   • CATARACT EXTRACTION Bilateral    • CHOLECYSTECTOMY N/A 3/26/2021    Procedure: CHOLECYSTECTOMY LAPAROSCOPIC (REQUESTS MARIVEL PATY);  Surgeon: Keagan Lopez MD;  Location: [facility];  Service: General;  Laterality: N/A;   • FRACTURE SURGERY      right foot orif   • PACEMAKER IMPLANTATION Left 2018       Family History   Problem Relation Age of Onset   • Breast cancer Sister    • Breast cancer Sister    • Breast cancer Sister         40's       Social History     Socioeconomic History   • Marital status:      Spouse name: Not on file   • Number of children: Not on file   • Years of education: Not on file   • Highest education level: Not on file   Tobacco Use   • Smoking status: Former Smoker     Packs/day: 0.50     Years: 10.00     Pack years: 5.00     Types: Cigarettes     Quit date: 3/1/1963     Years since quittin.2   • Smokeless tobacco: Never Used   Vaping Use   • Vaping Use: Never used   Substance and Sexual Activity   • Alcohol use: No   • Drug use: Never   • Sexual activity: Defer           Objective   Physical Exam  Vitals and nursing note reviewed.   Constitutional:       General: She is not in acute distress.     Appearance: Normal appearance. She is well-developed. She is not toxic-appearing or diaphoretic.   HENT:      Head:  Normocephalic and atraumatic.      Right Ear: External ear normal.      Left Ear: External ear normal.      Nose: Nose normal.      Mouth/Throat:      Pharynx: No oropharyngeal exudate.      Tonsils: No tonsillar exudate.   Eyes:      General: Lids are normal.      Conjunctiva/sclera: Conjunctivae normal.      Pupils: Pupils are equal, round, and reactive to light.   Neck:      Thyroid: No thyromegaly.   Cardiovascular:      Rate and Rhythm: Normal rate and regular rhythm.      Pulses: Normal pulses.      Heart sounds: Normal heart sounds, S1 normal and S2 normal.   Pulmonary:      Effort: Pulmonary effort is normal. No tachypnea or respiratory distress.      Breath sounds: Normal breath sounds. No decreased breath sounds, wheezing or rales.   Chest:      Chest wall: No tenderness.   Abdominal:      General: Bowel sounds are normal. There is no distension.      Palpations: Abdomen is soft.      Tenderness: There is abdominal tenderness in the suprapubic area. There is no guarding or rebound.   Musculoskeletal:         General: No tenderness or deformity. Normal range of motion.      Cervical back: Full passive range of motion without pain, normal range of motion and neck supple.   Lymphadenopathy:      Cervical: No cervical adenopathy.   Skin:     General: Skin is warm and dry.      Coloration: Skin is not pale.      Findings: No erythema or rash.   Neurological:      Mental Status: She is alert and oriented to person, place, and time.      GCS: GCS eye subscore is 4. GCS verbal subscore is 5. GCS motor subscore is 6.      Cranial Nerves: No cranial nerve deficit.      Sensory: No sensory deficit.   Psychiatric:         Speech: Speech normal.         Behavior: Behavior normal.         Thought Content: Thought content normal.         Judgment: Judgment normal.         Procedures           ED Course  ED Course as of Jun 01 1143   Sat May 29, 2021   0442 IMPRESSION:     1. Adenopathy in the right lower quadrant  mesentery concerning for malignancy. There is some adjacent inflammatory fat stranding with one of the nodes. Consider PET/CT for follow-up.  2. Mild thickening of the rectosigmoid colon could be due to incomplete distention or mild segmental colitis. There is colonic diverticulosis without focal diverticulitis.  3. Grossly normal small bowel and appendix.  4. Cholecystectomy with some mild fat stranding in the gallbladder fossa. This could be due to relatively recent surgery or some mild inflammation. There is no fluid collection.  5. Additional findings as above.   CT Abdomen Pelvis Without Contrast [ES]   0650 Ventricular-paced rhythm  Abnormal ECG  When compared with ECG of 24-JUL-2020 04:18,  No significant change was found  Vent. rate 70 BPM  NC interval * ms  QRS duration 158 ms  QT/QTcB 470/507 ms  P-R-T axes * -78 102   ECG 12 Lead [ES]      ED Course User Index  [ES] Neftali Navarro MD                                           MDM  Number of Diagnoses or Management Options  Bacterial UTI: new and requires workup  Colitis: new and requires workup  Generalized abdominal pain: new and requires workup  Hypertension, unspecified type: new and requires workup  Malignancy (CMS/HCC): new and requires workup     Amount and/or Complexity of Data Reviewed  Clinical lab tests: reviewed and ordered  Tests in the radiology section of CPT®: reviewed and ordered  Tests in the medicine section of CPT®: reviewed and ordered  Review and summarize past medical records: yes  Independent visualization of images, tracings, or specimens: yes    Risk of Complications, Morbidity, and/or Mortality  Presenting problems: moderate  Diagnostic procedures: moderate  Management options: moderate    Patient Progress  Patient progress: stable      Final diagnoses:   Generalized abdominal pain   Colitis   Malignancy (CMS/HCC)   Hypertension, unspecified type   Bacterial UTI       ED Disposition  ED Disposition     ED Disposition  Condition Comment    Discharge Stable           Shabnam De La Rosa MD  [address]  [place] [id number]  [phone]    Schedule an appointment as soon as possible for a visit in 1 day  REEVALUATE    Yvonne Paredes MD  [address] [facility] [id number]  [phone]    Schedule an appointment as soon as possible for a visit in 1 day  EVALUATE         Medication List      New Prescriptions    cefdinir 300 MG capsule  Commonly known as: OMNICEF  Take 1 capsule by mouth 2 (Two) Times a Day.     HYDROcodone-acetaminophen 5-325 MG per tablet  Commonly known as: NORCO  Take 1 tablet by mouth Every 6 (Six) Hours As Needed for Severe Pain .     metroNIDAZOLE 500 MG tablet  Commonly known as: FLAGYL  Take 1 tablet by mouth 4 (Four) Times a Day for 7 days.           Where to Get Your Medications      These medications were sent to [id number] - [address] [facility] [address] - [phone]  - [phone] [address] [facility] [address]    Phone: [phone]   · HYDROcodone-acetaminophen 5-325 MG per tablet     You can get these medications from any pharmacy    Bring a paper prescription for each of these medications  · cefdinir 300 MG capsule  · metroNIDAZOLE 500 MG tablet          Neftali Navarro MD  06/01/21 1086

## 2021-06-07 ENCOUNTER — TRANSCRIBE ORDERS (OUTPATIENT)
Dept: ADMINISTRATIVE | Facility: HOSPITAL | Age: 81
End: 2021-06-07

## 2021-06-07 DIAGNOSIS — C76.2 MALIGNANT NEOPLASM OF ABDOMEN (HCC): Primary | ICD-10-CM

## 2021-06-11 ENCOUNTER — HOSPITAL ENCOUNTER (OUTPATIENT)
Dept: PET IMAGING | Facility: HOSPITAL | Age: 81
Discharge: HOME | End: 2021-06-11

## 2021-06-11 DIAGNOSIS — C76.2 MALIGNANT NEOPLASM OF ABDOMEN (HCC): ICD-10-CM

## 2021-06-14 ENCOUNTER — HOSPITAL ENCOUNTER (OUTPATIENT)
Dept: PET IMAGING | Facility: HOSPITAL | Age: 81
Discharge: HOME | End: 2021-06-14
Admitting: INTERNAL MEDICINE

## 2021-06-14 PROCEDURE — 0 FLUDEOXYGLUCOSE F18 SOLUTION: Performed by: INTERNAL MEDICINE

## 2021-06-14 PROCEDURE — A9552 F18 FDG: HCPCS | Performed by: INTERNAL MEDICINE

## 2021-06-14 PROCEDURE — 78815 PET IMAGE W/CT SKULL-THIGH: CPT

## 2021-06-14 RX ADMIN — FLUDEOXYGLUCOSE F18 1 DOSE: 300 INJECTION INTRAVENOUS at 08:19

## 2021-06-15 PROCEDURE — 78815 PET IMAGE W/CT SKULL-THIGH: CPT | Performed by: RADIOLOGY

## 2021-06-16 ENCOUNTER — OFFICE VISIT (OUTPATIENT)
Dept: SURGERY | Facility: CLINIC | Age: 81
End: 2021-06-16

## 2021-06-16 VITALS
SYSTOLIC BLOOD PRESSURE: 130 MMHG | WEIGHT: 200 LBS | HEART RATE: 70 BPM | HEIGHT: 67 IN | DIASTOLIC BLOOD PRESSURE: 82 MMHG | BODY MASS INDEX: 31.39 KG/M2

## 2021-06-16 DIAGNOSIS — R19.07 GENERALIZED ABDOMINAL MASS: Primary | ICD-10-CM

## 2021-06-16 PROCEDURE — 99213 OFFICE O/P EST LOW 20 MIN: CPT | Performed by: SURGERY

## 2021-06-17 PROBLEM — R19.07 GENERALIZED ABDOMINAL MASS: Status: ACTIVE | Noted: 2021-06-17

## 2021-06-17 NOTE — PROGRESS NOTES
Subjective   Rachana Justin is a 80 y.o. female is being seen for consultation today at the request of Shabnam De La Rosa MD    Rachana Justin is a 80 y.o. female Seen in the emergency department for abdominal pain.  CT the abdomen pelvis at that time showed mesenteric mass in the right lower quadrant.  No known primary or history of cancer is noted.  No other symptomatology identified.  On PET CT the nodule is PET active in the left lower abdomen likely due to mobility of the mesentery.  No type B symptoms.  She is on anticoagulation with Eliquis.      Past Medical History:   Diagnosis Date   • Ambulates with cane    • Arthritis    • Atrial fibrillation (CMS/HCC)    • CHF (congestive heart failure) (CMS/HCC)    • Diabetes mellitus (CMS/HCC)    • Gallstones    • GERD (gastroesophageal reflux disease)    • Hyperlipidemia    • Hypertension    • UTI (urinary tract infection)        Family History   Problem Relation Age of Onset   • Breast cancer Sister    • Breast cancer Sister    • Breast cancer Sister         40's       Social History     Socioeconomic History   • Marital status:      Spouse name: Not on file   • Number of children: Not on file   • Years of education: Not on file   • Highest education level: Not on file   Tobacco Use   • Smoking status: Former Smoker     Packs/day: 0.50     Years: 10.00     Pack years: 5.00     Types: Cigarettes     Quit date: 3/1/1963     Years since quittin.3   • Smokeless tobacco: Never Used   Vaping Use   • Vaping Use: Never used   Substance and Sexual Activity   • Alcohol use: No   • Drug use: Never   • Sexual activity: Defer       Past Surgical History:   Procedure Laterality Date   • CATARACT EXTRACTION Bilateral    • CHOLECYSTECTOMY N/A 3/26/2021    Procedure: CHOLECYSTECTOMY LAPAROSCOPIC (REQUESTS MARIVEL NEWMAN);  Surgeon: Keagan Lopez MD;  Location: [facility];  Service: General;  Laterality: N/A;   • FRACTURE SURGERY      right foot orif   • PACEMAKER  "IMPLANTATION Left 04/2018       Review of Systems   Constitutional: Negative for activity change, appetite change, chills and fever.   HENT: Negative for sore throat and trouble swallowing.    Eyes: Negative for visual disturbance.   Respiratory: Negative for cough and shortness of breath.    Cardiovascular: Negative for chest pain and palpitations.   Gastrointestinal: Positive for abdominal pain. Negative for abdominal distention, blood in stool, constipation, diarrhea, nausea and vomiting.   Endocrine: Negative for cold intolerance and heat intolerance.   Genitourinary: Negative for dysuria.   Musculoskeletal: Negative for joint swelling.   Skin: Negative for color change, rash and wound.   Allergic/Immunologic: Negative for immunocompromised state.   Neurological: Negative for dizziness, seizures, weakness and headaches.   Hematological: Negative for adenopathy. Does not bruise/bleed easily.   Psychiatric/Behavioral: Negative for agitation and confusion.         /82   Pulse 70   Ht 170.2 cm (67.01\")   Wt 90.7 kg (200 lb)   BMI 31.32 kg/m²   Objective   Physical Exam  Constitutional:       Appearance: She is well-developed.   HENT:      Head: Normocephalic and atraumatic.   Eyes:      Conjunctiva/sclera: Conjunctivae normal.      Pupils: Pupils are equal, round, and reactive to light.   Neck:      Thyroid: No thyromegaly.      Vascular: No JVD.      Trachea: No tracheal deviation.   Cardiovascular:      Rate and Rhythm: Normal rate and regular rhythm.      Heart sounds: No murmur heard.   No friction rub. No gallop.    Pulmonary:      Effort: Pulmonary effort is normal.      Breath sounds: Normal breath sounds.   Abdominal:      General: There is no distension.      Palpations: Abdomen is soft. There is no hepatomegaly or splenomegaly.      Tenderness: There is no abdominal tenderness.      Hernia: No hernia is present.   Musculoskeletal:         General: No deformity. Normal range of motion.      " Cervical back: Neck supple.   Skin:     General: Skin is warm and dry.   Neurological:      Mental Status: She is alert and oriented to person, place, and time.               Assessment   Diagnoses and all orders for this visit:    1. Generalized abdominal mass (Primary)      Rachana Justin is a 80 y.o. female with PET avid nodules in the mesentery of the lower abdomen.  No other identified areas of hypermetabolic disease and no primary tumor identified.  Patient will be discussed at tumor board to see best possible method for biopsy or removal of these lesions.  I suspect she will require diagnostic laparoscopy or possible open resection given the nature of these being involved with the mesentery.  Likely lymphoma and with the 2 areas that is the only hypermetabolic lesions segmental bowel resection these grayson basins could be curative.    Patient's Body mass index is 31.32 kg/m². indicating that she is obese (BMI >30). Obesity-related health conditions include the following: hypertension, coronary heart disease, dyslipidemias and GERD. Obesity is unchanged. BMI is is above average; BMI management plan is completed. We discussed portion control and increasing exercise..

## 2021-06-23 ENCOUNTER — OFFICE VISIT (OUTPATIENT)
Dept: SURGERY | Facility: CLINIC | Age: 81
End: 2021-06-23

## 2021-06-23 VITALS — HEIGHT: 67 IN | BODY MASS INDEX: 31.39 KG/M2 | WEIGHT: 200 LBS

## 2021-06-23 DIAGNOSIS — R59.0 MESENTERIC LYMPHADENOPATHY: Primary | ICD-10-CM

## 2021-06-23 PROCEDURE — 99213 OFFICE O/P EST LOW 20 MIN: CPT | Performed by: SURGERY

## 2021-06-23 RX ORDER — SODIUM, POTASSIUM,MAG SULFATES 17.5-3.13G
2 SOLUTION, RECONSTITUTED, ORAL ORAL EVERY 12 HOURS
Qty: 177 ML | Refills: 0 | Status: ON HOLD | OUTPATIENT
Start: 2021-06-23 | End: 2021-08-12

## 2021-06-24 DIAGNOSIS — Z20.822 ENCOUNTER FOR PREPROCEDURE SCREENING LABORATORY TESTING FOR COVID-19: Primary | ICD-10-CM

## 2021-06-24 DIAGNOSIS — Z01.812 ENCOUNTER FOR PREPROCEDURE SCREENING LABORATORY TESTING FOR COVID-19: Primary | ICD-10-CM

## 2021-06-24 NOTE — PROGRESS NOTES
Subjective   Rachana Justin is a 80 y.o. female is being seen for consultation today at the request of Shabnam De La Rosa MD    Rachana Justin is a 80 y.o. female Seen in the emergency department for abdominal pain.  CT the abdomen pelvis at that time showed mesenteric mass in the right lower quadrant.  No known primary or history of cancer is noted.  No other symptomatology identified.  On PET CT the nodule is PET active in the left lower abdomen likely due to mobility of the mesentery.  No type B symptoms.  She is on anticoagulation with Eliquis.  PET CT negative for any other PET avid lesions and after tumor board discussion this likely represents lymphoma though GI malignancy has not been excluded as her most recent endoscopy is greater than 10 years ago.      Past Medical History:   Diagnosis Date   • Ambulates with cane    • Arthritis    • Atrial fibrillation (CMS/HCC)    • CHF (congestive heart failure) (CMS/HCC)    • Diabetes mellitus (CMS/HCC)    • Gallstones    • GERD (gastroesophageal reflux disease)    • Hyperlipidemia    • Hypertension    • UTI (urinary tract infection)        Family History   Problem Relation Age of Onset   • Breast cancer Sister    • Breast cancer Sister    • Breast cancer Sister         40's       Social History     Socioeconomic History   • Marital status:      Spouse name: Not on file   • Number of children: Not on file   • Years of education: Not on file   • Highest education level: Not on file   Tobacco Use   • Smoking status: Former Smoker     Packs/day: 0.50     Years: 10.00     Pack years: 5.00     Types: Cigarettes     Quit date: 3/1/1963     Years since quittin.3   • Smokeless tobacco: Never Used   Vaping Use   • Vaping Use: Never used   Substance and Sexual Activity   • Alcohol use: No   • Drug use: Never   • Sexual activity: Defer       Past Surgical History:   Procedure Laterality Date   • CATARACT EXTRACTION Bilateral    • CHOLECYSTECTOMY N/A 3/26/2021     "Procedure: CHOLECYSTECTOMY LAPAROSCOPIC (REQUESTS MARIVEL NEWMAN);  Surgeon: Keagan Lopez MD;  Location: [facility];  Service: General;  Laterality: N/A;   • FRACTURE SURGERY      right foot orif   • PACEMAKER IMPLANTATION Left 04/2018       Review of Systems   Constitutional: Negative for activity change, appetite change, chills and fever.   HENT: Negative for sore throat and trouble swallowing.    Eyes: Negative for visual disturbance.   Respiratory: Negative for cough and shortness of breath.    Cardiovascular: Negative for chest pain and palpitations.   Gastrointestinal: Positive for abdominal pain. Negative for abdominal distention, blood in stool, constipation, diarrhea, nausea and vomiting.   Endocrine: Negative for cold intolerance and heat intolerance.   Genitourinary: Negative for dysuria.   Musculoskeletal: Negative for joint swelling.   Skin: Negative for color change, rash and wound.   Allergic/Immunologic: Negative for immunocompromised state.   Neurological: Negative for dizziness, seizures, weakness and headaches.   Hematological: Negative for adenopathy. Does not bruise/bleed easily.   Psychiatric/Behavioral: Negative for agitation and confusion.         Ht 170.2 cm (67.01\")   Wt 90.7 kg (200 lb)   BMI 31.32 kg/m²   Objective   Physical Exam  Constitutional:       Appearance: She is well-developed.   HENT:      Head: Normocephalic and atraumatic.   Eyes:      Conjunctiva/sclera: Conjunctivae normal.      Pupils: Pupils are equal, round, and reactive to light.   Neck:      Thyroid: No thyromegaly.      Vascular: No JVD.      Trachea: No tracheal deviation.   Cardiovascular:      Rate and Rhythm: Normal rate and regular rhythm.      Heart sounds: No murmur heard.   No friction rub. No gallop.    Pulmonary:      Effort: Pulmonary effort is normal.      Breath sounds: Normal breath sounds.   Abdominal:      General: There is no distension.      Palpations: Abdomen is soft. There is no hepatomegaly " or splenomegaly.      Tenderness: There is no abdominal tenderness.      Hernia: No hernia is present.   Musculoskeletal:         General: No deformity. Normal range of motion.      Cervical back: Neck supple.   Skin:     General: Skin is warm and dry.   Neurological:      Mental Status: She is alert and oriented to person, place, and time.               Assessment   Diagnoses and all orders for this visit:    1. Mesenteric lymphadenopathy (Primary)  -     Case Request; Standing  -     Case Request    Other orders  -     Follow Anesthesia Guidelines / Protocol; Future  -     Provide NPO Instructions to Patient; Future  -     Chlorhexidine Skin Prep; Future  -     Follow Anesthesia Guidelines / Protocol; Standing  -     Verify NPO Status; Standing  -     Obtain informed consent; Standing  -     Verify / Perform Chlorhexidine Skin Prep; Standing  -     Verify / Perform Chlorhexidine Skin Prep if Indicated (If Not Already Completed); Standing  -     sodium-potassium-magnesium sulfates (Suprep Bowel Prep Kit) 17.5-3.13-1.6 GM/177ML solution oral solution; Take 2 bottles by mouth Every 12 (Twelve) Hours.  Dispense: 177 mL; Refill: 0      Rachana ALEX Justin is a 80 y.o. female with PET avid nodules in the mesentery of the lower abdomen.  No other identified areas of hypermetabolic disease and no primary tumor identified.  Patient will be discussed at tumor board to see best possible method for biopsy or removal of these lesions.  I suspect she will require diagnostic laparoscopy or possible open resection given the nature of these being involved with the mesentery.  Likely lymphoma and with the 2 areas that is the only hypermetabolic lesions segmental bowel resection these grayson basins could be curative.  Prior to surgical biopsy or resection we will perform EGD and colonoscopy to exclude GI source for possible metastatic lesion.    Patient's Body mass index is 31.32 kg/m². indicating that she is obese (BMI >30).  Obesity-related health conditions include the following: hypertension, coronary heart disease, dyslipidemias and GERD. Obesity is unchanged. BMI is is above average; BMI management plan is completed. We discussed portion control and increasing exercise..

## 2021-07-09 ENCOUNTER — TELEPHONE (OUTPATIENT)
Dept: SURGERY | Facility: CLINIC | Age: 81
End: 2021-07-09

## 2021-07-09 NOTE — TELEPHONE ENCOUNTER
Talked with patient about surgery time and bowel prep,covid testing, patient voiced understanding.

## 2021-07-12 ENCOUNTER — LAB (OUTPATIENT)
Dept: LAB | Facility: HOSPITAL | Age: 81
End: 2021-07-12

## 2021-07-12 DIAGNOSIS — Z20.822 ENCOUNTER FOR PREPROCEDURE SCREENING LABORATORY TESTING FOR COVID-19: ICD-10-CM

## 2021-07-12 DIAGNOSIS — Z01.812 ENCOUNTER FOR PREPROCEDURE SCREENING LABORATORY TESTING FOR COVID-19: ICD-10-CM

## 2021-07-12 LAB — SARS-COV-2 RNA NOSE QL NAA+PROBE: NOT DETECTED

## 2021-07-12 PROCEDURE — U0004 COV-19 TEST NON-CDC HGH THRU: HCPCS | Performed by: SURGERY

## 2021-07-12 PROCEDURE — U0005 INFEC AGEN DETEC AMPLI PROBE: HCPCS | Performed by: SURGERY

## 2021-07-12 PROCEDURE — C9803 HOPD COVID-19 SPEC COLLECT: HCPCS | Performed by: SURGERY

## 2021-07-14 ENCOUNTER — HOSPITAL ENCOUNTER (OUTPATIENT)
Facility: HOSPITAL | Age: 81
Setting detail: HOSPITAL OUTPATIENT SURGERY
Discharge: HOME | End: 2021-07-14
Attending: SURGERY | Admitting: SURGERY

## 2021-07-14 ENCOUNTER — ANESTHESIA (OUTPATIENT)
Dept: PERIOP | Facility: HOSPITAL | Age: 81
End: 2021-07-14

## 2021-07-14 ENCOUNTER — ANESTHESIA EVENT (OUTPATIENT)
Dept: PERIOP | Facility: HOSPITAL | Age: 81
End: 2021-07-14

## 2021-07-14 VITALS
OXYGEN SATURATION: 97 % | SYSTOLIC BLOOD PRESSURE: 166 MMHG | TEMPERATURE: 97 F | HEART RATE: 70 BPM | DIASTOLIC BLOOD PRESSURE: 87 MMHG | BODY MASS INDEX: 29.03 KG/M2 | WEIGHT: 185 LBS | RESPIRATION RATE: 20 BRPM | HEIGHT: 67 IN

## 2021-07-14 DIAGNOSIS — R59.0 MESENTERIC LYMPHADENOPATHY: ICD-10-CM

## 2021-07-14 LAB — GLUCOSE BLDC GLUCOMTR-MCNC: 114 MG/DL (ref 70–130)

## 2021-07-14 PROCEDURE — 88305 TISSUE EXAM BY PATHOLOGIST: CPT | Performed by: SURGERY

## 2021-07-14 PROCEDURE — 45378 DIAGNOSTIC COLONOSCOPY: CPT | Performed by: SURGERY

## 2021-07-14 PROCEDURE — 43239 EGD BIOPSY SINGLE/MULTIPLE: CPT | Performed by: SURGERY

## 2021-07-14 PROCEDURE — 25010000002 PROPOFOL 10 MG/ML EMULSION: Performed by: NURSE ANESTHETIST, CERTIFIED REGISTERED

## 2021-07-14 PROCEDURE — 82962 GLUCOSE BLOOD TEST: CPT

## 2021-07-14 RX ORDER — IPRATROPIUM BROMIDE AND ALBUTEROL SULFATE 2.5; .5 MG/3ML; MG/3ML
3 SOLUTION RESPIRATORY (INHALATION) ONCE AS NEEDED
Status: DISCONTINUED | OUTPATIENT
Start: 2021-07-14 | End: 2021-07-14 | Stop reason: HOSPADM

## 2021-07-14 RX ORDER — SODIUM CHLORIDE, SODIUM LACTATE, POTASSIUM CHLORIDE, CALCIUM CHLORIDE 600; 310; 30; 20 MG/100ML; MG/100ML; MG/100ML; MG/100ML
125 INJECTION, SOLUTION INTRAVENOUS ONCE
Status: COMPLETED | OUTPATIENT
Start: 2021-07-14 | End: 2021-07-14

## 2021-07-14 RX ORDER — PROPOFOL 10 MG/ML
VIAL (ML) INTRAVENOUS CONTINUOUS PRN
Status: DISCONTINUED | OUTPATIENT
Start: 2021-07-14 | End: 2021-07-14 | Stop reason: SURG

## 2021-07-14 RX ORDER — ONDANSETRON 2 MG/ML
4 INJECTION INTRAMUSCULAR; INTRAVENOUS AS NEEDED
Status: DISCONTINUED | OUTPATIENT
Start: 2021-07-14 | End: 2021-07-14 | Stop reason: HOSPADM

## 2021-07-14 RX ORDER — MEPERIDINE HYDROCHLORIDE 25 MG/ML
12.5 INJECTION INTRAMUSCULAR; INTRAVENOUS; SUBCUTANEOUS
Status: DISCONTINUED | OUTPATIENT
Start: 2021-07-14 | End: 2021-07-14 | Stop reason: HOSPADM

## 2021-07-14 RX ORDER — SODIUM CHLORIDE 0.9 % (FLUSH) 0.9 %
10 SYRINGE (ML) INJECTION AS NEEDED
Status: DISCONTINUED | OUTPATIENT
Start: 2021-07-14 | End: 2021-07-14 | Stop reason: HOSPADM

## 2021-07-14 RX ORDER — MIDAZOLAM HYDROCHLORIDE 1 MG/ML
0.5 INJECTION INTRAMUSCULAR; INTRAVENOUS
Status: DISCONTINUED | OUTPATIENT
Start: 2021-07-14 | End: 2021-07-14 | Stop reason: HOSPADM

## 2021-07-14 RX ORDER — FENTANYL CITRATE 50 UG/ML
50 INJECTION, SOLUTION INTRAMUSCULAR; INTRAVENOUS
Status: DISCONTINUED | OUTPATIENT
Start: 2021-07-14 | End: 2021-07-14 | Stop reason: HOSPADM

## 2021-07-14 RX ORDER — SODIUM CHLORIDE 0.9 % (FLUSH) 0.9 %
10 SYRINGE (ML) INJECTION EVERY 12 HOURS SCHEDULED
Status: DISCONTINUED | OUTPATIENT
Start: 2021-07-14 | End: 2021-07-14 | Stop reason: HOSPADM

## 2021-07-14 RX ADMIN — PROPOFOL 100 MCG/KG/MIN: 10 INJECTION, EMULSION INTRAVENOUS at 09:48

## 2021-07-14 RX ADMIN — SODIUM CHLORIDE, POTASSIUM CHLORIDE, SODIUM LACTATE AND CALCIUM CHLORIDE: 600; 310; 30; 20 INJECTION, SOLUTION INTRAVENOUS at 09:47

## 2021-07-14 NOTE — OP NOTE
ESOPHAGOGASTRODUODENOSCOPY WITH ANESTHESIA, COLONOSCOPY  Procedure Note    Rachana Justin  7/14/2021    Pre-op Diagnosis:   Mesenteric lymphadenopathy [R59.0]    Post-op Diagnosis:   Same, diverticulosis    Indications: See above    Procedure(s):  ESOPHAGOGASTRODUODENOSCOPY WITH BIOPSY   COLONOSCOPY    Surgeon(s):  Troy Elkins MD    Anesthesia: General    Staff:   Circulator: Ramonita Blood RN  Scrub Person: Rubén Fernando  Endo Technician: Rubén Fernando    Findings: Mild antral gastritis, diverticulosis, normal colon otherwise.    Operative Procedure: The patient was taken to the operating suite and placed in left lateral decubitus position.  Bilateral sequential compression devices were in place and IV anesthesia was administered.  Timeout procedure was performed.  The endoscope was inserted into the oropharynx and the esophagus was intubated.  The scope was advanced to the third portion of the duodenum.  The scope was slowly withdrawn evaluating all mucosal areas.  Duodenum was within normal limits.  The scope was withdrawn and the gastric lumen and there is no evidence of ulceration though there was mild antral gastritis sampled with biopsy forceps.  Retroflexion showed no hiatal hernia.  The patient had mild Candida deposition in the esophagus without ulceration or abnormality otherwise and biopsies were obtained with biopsy forceps.  The remainder of the esophageal mucosa was within normal limits. The endoscope was removed.  Digital rectal examination was negative.  The colonoscope was inserted and advanced to the cecum as evidenced by the ileocecal valve and appendiceal orifice.  The bowel prep was excellent.  The colonoscope was slowly removed and the entirety of the colonic mucosa was evaluated.  Colonoscopic findings included diverticular disease.  There was mild sigmoid diverticulosis.  The colonoscope was then removed and the patient was awakened from anesthesia and taken to recovery.   The patient tolerated the procedure well.    Estimated Blood Loss: minimal    Specimens:   Antral biopsy, esophageal biopsy                 Drains: none    Grafts or Implants: none    Complications: none    Recommendations: Treat Candida, follow-up to discuss surgical intervention for intra-abdominal mesenteric masses    Troy Elkins MD

## 2021-07-14 NOTE — ANESTHESIA PREPROCEDURE EVALUATION
Anesthesia Evaluation     Patient summary reviewed and Nursing notes reviewed   no history of anesthetic complications:  NPO Solid Status: > 8 hours  NPO Liquid Status: > 8 hours           Airway   Mallampati: II  TM distance: >3 FB  Neck ROM: full  No difficulty expected  Dental    (+) edentulous    Pulmonary - negative pulmonary ROS    breath sounds clear to auscultation  Cardiovascular   Exercise tolerance: poor (<4 METS)    Rhythm: regular  Rate: normal    (+) hypertension, dysrhythmias Atrial Fib, CHF , hyperlipidemia,       Neuro/Psych- negative ROS  GI/Hepatic/Renal/Endo    (+)  GERD,  diabetes mellitus type 2 well controlled,     Musculoskeletal     (+) arthralgias, chronic pain, gait problem, neck stiffness,   Abdominal   (+) scaphoid   Substance History - negative use     OB/GYN          Other   arthritis,      ROS/Med Hx Other: WT loss  GB stones                    Anesthesia Plan    ASA 3     general     intravenous induction     Anesthetic plan, all risks, benefits, and alternatives have been provided, discussed and informed consent has been obtained with: patient.  Use of blood products discussed with consented to blood products.   Plan discussed with CRNA.

## 2021-07-14 NOTE — ANESTHESIA POSTPROCEDURE EVALUATION
Patient: Rachana JOHNSON Nhan    Procedure Summary     Date: 07/14/21 [facility]    Anesthesia Start: 0943 Anesthesia Stop: 1005    Procedures:       ESOPHAGOGASTRODUODENOSCOPY WITH ANESTHESIA (N/A Esophagus)      COLONOSCOPY (N/A ) Diagnosis:       Mesenteric lymphadenopathy      (Mesenteric lymphadenopathy [R59.0])    Surgeons: Troy Elkins MD Provider: Isidro Reed MD    Anesthesia Type: general ASA Status: 3          Anesthesia Type: general    Vitals  Vitals Value Taken Time   /84 07/14/21 1020   Temp 97 °F (36.1 °C) 07/14/21 1005   Pulse 70 07/14/21 1020   Resp 20 07/14/21 1020   SpO2 98 % 07/14/21 1020           Post Anesthesia Care and Evaluation    Patient location during evaluation: bedside  Patient participation: complete - patient participated  Level of consciousness: awake and alert  Pain score: 1  Pain management: adequate  Airway patency: patent  Anesthetic complications: No anesthetic complications  PONV Status: none  Cardiovascular status: acceptable  Respiratory status: acceptable  Hydration status: acceptable

## 2021-07-16 LAB — LAB AP CASE REPORT: NORMAL

## 2021-07-27 ENCOUNTER — OFFICE VISIT (OUTPATIENT)
Dept: SURGERY | Facility: CLINIC | Age: 81
End: 2021-07-27

## 2021-07-27 VITALS — BODY MASS INDEX: 29.03 KG/M2 | HEIGHT: 67 IN | WEIGHT: 185 LBS

## 2021-07-27 DIAGNOSIS — R59.0 MESENTERIC LYMPHADENOPATHY: Primary | ICD-10-CM

## 2021-07-27 PROCEDURE — 99213 OFFICE O/P EST LOW 20 MIN: CPT | Performed by: SURGERY

## 2021-07-27 NOTE — PROGRESS NOTES
Subjective   Rachana Justin is a 80 y.o. female is being seen for consultation today at the request of Shabnam De La Rosa MD    Rachana Justin is a 80 y.o. female Seen in the emergency department for abdominal pain.  CT the abdomen pelvis at that time showed mesenteric mass in the right lower quadrant.  No known primary or history of cancer is noted.  No other symptomatology identified.  On PET CT the nodule is PET active in the left lower abdomen likely due to mobility of the mesentery.  No type B symptoms.  She is on anticoagulation with Eliquis.  PET CT negative for any other PET avid lesions and after tumor board discussion this likely represents lymphoma though GI malignancy has not been excluded as her most recent endoscopy is greater than 10 years ago.  Colonoscopy and EGD negative for GI source.      Past Medical History:   Diagnosis Date   • Ambulates with cane    • Arthritis    • Atrial fibrillation (CMS/HCC)    • CHF (congestive heart failure) (CMS/HCC)    • Diabetes mellitus (CMS/HCC)    • Elevated cholesterol    • Gallstones    • GERD (gastroesophageal reflux disease)    • Hyperlipidemia    • Hypertension    • UTI (urinary tract infection)        Family History   Problem Relation Age of Onset   • Breast cancer Sister    • Breast cancer Sister    • Breast cancer Sister         40's       Social History     Socioeconomic History   • Marital status:      Spouse name: Not on file   • Number of children: Not on file   • Years of education: Not on file   • Highest education level: Not on file   Tobacco Use   • Smoking status: Former Smoker     Packs/day: 0.50     Years: 10.00     Pack years: 5.00     Types: Cigarettes     Quit date: 3/1/1963     Years since quittin.4   • Smokeless tobacco: Never Used   Vaping Use   • Vaping Use: Never used   Substance and Sexual Activity   • Alcohol use: No   • Drug use: Never   • Sexual activity: Defer       Past Surgical History:   Procedure Laterality Date  "  • CATARACT EXTRACTION Bilateral    • CHOLECYSTECTOMY N/A 3/26/2021    Procedure: CHOLECYSTECTOMY LAPAROSCOPIC (REQUESTS MARIVEL NEWMAN);  Surgeon: Keagan Lopez MD;  Location: [facility];  Service: General;  Laterality: N/A;   • COLONOSCOPY N/A 7/14/2021    Procedure: COLONOSCOPY;  Surgeon: Troy Elkins MD;  Location: [facility];  Service: Gastroenterology;  Laterality: N/A;   • ENDOSCOPY N/A 7/14/2021    Procedure: ESOPHAGOGASTRODUODENOSCOPY WITH ANESTHESIA;  Surgeon: Troy Elkins MD;  Location: [facility];  Service: Gastroenterology;  Laterality: N/A;   • FRACTURE SURGERY      right foot orif   • PACEMAKER IMPLANTATION Left 04/2018       Review of Systems   Constitutional: Negative for activity change, appetite change, chills and fever.   HENT: Negative for sore throat and trouble swallowing.    Eyes: Negative for visual disturbance.   Respiratory: Negative for cough and shortness of breath.    Cardiovascular: Negative for chest pain and palpitations.   Gastrointestinal: Positive for abdominal pain. Negative for abdominal distention, blood in stool, constipation, diarrhea, nausea and vomiting.   Endocrine: Negative for cold intolerance and heat intolerance.   Genitourinary: Negative for dysuria.   Musculoskeletal: Negative for joint swelling.   Skin: Negative for color change, rash and wound.   Allergic/Immunologic: Negative for immunocompromised state.   Neurological: Negative for dizziness, seizures, weakness and headaches.   Hematological: Negative for adenopathy. Does not bruise/bleed easily.   Psychiatric/Behavioral: Negative for agitation and confusion.         Ht 170.2 cm (67.01\")   Wt 83.9 kg (185 lb)   BMI 28.97 kg/m²   Objective   Physical Exam  Constitutional:       Appearance: She is well-developed.   HENT:      Head: Normocephalic and atraumatic.   Eyes:      Conjunctiva/sclera: Conjunctivae normal.      Pupils: Pupils are equal, round, and reactive to light.   Neck:      Thyroid: " No thyromegaly.      Vascular: No JVD.      Trachea: No tracheal deviation.   Cardiovascular:      Rate and Rhythm: Normal rate and regular rhythm.      Heart sounds: No murmur heard.   No friction rub. No gallop.    Pulmonary:      Effort: Pulmonary effort is normal.      Breath sounds: Normal breath sounds.   Abdominal:      General: There is no distension.      Palpations: Abdomen is soft. There is no hepatomegaly or splenomegaly.      Tenderness: There is no abdominal tenderness.      Hernia: No hernia is present.   Musculoskeletal:         General: No deformity. Normal range of motion.      Cervical back: Neck supple.   Skin:     General: Skin is warm and dry.   Neurological:      Mental Status: She is alert and oriented to person, place, and time.               Assessment   Diagnoses and all orders for this visit:    1. Mesenteric lymphadenopathy (Primary)  -     Case Request; Standing  -     ampicillin-sulbactam (UNASYN) 3 g in sodium chloride 0.9 % 100 mL IVPB  -     Case Request    Other orders  -     Follow Anesthesia Guidelines / Protocol; Future  -     Provide NPO Instructions to Patient; Future  -     Chlorhexidine Skin Prep; Future  -     Follow Anesthesia Guidelines / Protocol; Standing  -     Verify NPO Status; Standing  -     Obtain informed consent; Standing  -     Verify / Perform Chlorhexidine Skin Prep; Standing  -     Verify / Perform Chlorhexidine Skin Prep if Indicated (If Not Already Completed); Standing      Rachana ALEX Justin is a 80 y.o. female with PET avid nodules in the mesentery of the lower abdomen.  No other identified areas of hypermetabolic disease and no primary tumor identified.  Patient will be discussed at tumor board to see best possible method for biopsy or removal of these lesions.  The patient has no evidence of GI source on upper and lower endoscopy.  I suspect lymphoma and she may benefit from resection of this area of mesentery which may be curative.  She will undergo  diagnostic laparoscopy with possible laparotomy and bowel resection versus biopsy.    Patient's Body mass index is 28.97 kg/m². indicating that she is obese (BMI >30). Obesity-related health conditions include the following: hypertension, coronary heart disease, dyslipidemias and GERD. Obesity is unchanged. BMI is is above average; BMI management plan is completed. We discussed portion control and increasing exercise..

## 2021-07-28 DIAGNOSIS — Z20.822 ENCOUNTER FOR PREPROCEDURE SCREENING LABORATORY TESTING FOR COVID-19: Primary | ICD-10-CM

## 2021-07-28 DIAGNOSIS — Z01.812 ENCOUNTER FOR PREPROCEDURE SCREENING LABORATORY TESTING FOR COVID-19: Primary | ICD-10-CM

## 2021-08-05 ENCOUNTER — TELEPHONE (OUTPATIENT)
Dept: SURGERY | Facility: CLINIC | Age: 81
End: 2021-08-05

## 2021-08-10 ENCOUNTER — LAB (OUTPATIENT)
Dept: LAB | Facility: HOSPITAL | Age: 81
End: 2021-08-10

## 2021-08-10 DIAGNOSIS — Z20.822 ENCOUNTER FOR PREPROCEDURE SCREENING LABORATORY TESTING FOR COVID-19: ICD-10-CM

## 2021-08-10 DIAGNOSIS — Z01.812 ENCOUNTER FOR PREPROCEDURE SCREENING LABORATORY TESTING FOR COVID-19: ICD-10-CM

## 2021-08-10 LAB — SARS-COV-2 RNA PNL SPEC NAA+PROBE: NOT DETECTED

## 2021-08-10 PROCEDURE — C9803 HOPD COVID-19 SPEC COLLECT: HCPCS

## 2021-08-10 PROCEDURE — U0004 COV-19 TEST NON-CDC HGH THRU: HCPCS | Performed by: SURGERY

## 2021-08-10 PROCEDURE — U0005 INFEC AGEN DETEC AMPLI PROBE: HCPCS | Performed by: SURGERY

## 2021-08-12 ENCOUNTER — ANESTHESIA EVENT (OUTPATIENT)
Dept: PERIOP | Facility: HOSPITAL | Age: 81
End: 2021-08-12

## 2021-08-12 ENCOUNTER — ANESTHESIA (OUTPATIENT)
Dept: PERIOP | Facility: HOSPITAL | Age: 81
End: 2021-08-12

## 2021-08-12 ENCOUNTER — HOSPITAL ENCOUNTER (INPATIENT)
Facility: HOSPITAL | Age: 81
LOS: 6 days | Discharge: HOME | End: 2021-08-18
Attending: SURGERY | Admitting: SURGERY

## 2021-08-12 ENCOUNTER — APPOINTMENT (OUTPATIENT)
Dept: GENERAL RADIOLOGY | Facility: HOSPITAL | Age: 81
End: 2021-08-12

## 2021-08-12 DIAGNOSIS — R19.07 GENERALIZED ABDOMINAL MASS: Primary | ICD-10-CM

## 2021-08-12 DIAGNOSIS — R59.0 MESENTERIC LYMPHADENOPATHY: ICD-10-CM

## 2021-08-12 LAB
GLUCOSE BLDC GLUCOMTR-MCNC: 162 MG/DL (ref 70–130)
GLUCOSE BLDC GLUCOMTR-MCNC: 221 MG/DL (ref 70–130)
GLUCOSE BLDC GLUCOMTR-MCNC: 341 MG/DL (ref 70–130)

## 2021-08-12 PROCEDURE — 44120 REMOVAL OF SMALL INTESTINE: CPT | Performed by: SURGERY

## 2021-08-12 PROCEDURE — 25010000002 NEOSTIGMINE 10 MG/10ML SOLUTION: Performed by: NURSE ANESTHETIST, CERTIFIED REGISTERED

## 2021-08-12 PROCEDURE — 88305 TISSUE EXAM BY PATHOLOGIST: CPT | Performed by: SURGERY

## 2021-08-12 PROCEDURE — 07BB0ZX EXCISION OF MESENTERIC LYMPHATIC, OPEN APPROACH, DIAGNOSTIC: ICD-10-PCS | Performed by: SURGERY

## 2021-08-12 PROCEDURE — 25010000002 DEXAMETHASONE PER 1 MG: Performed by: ANESTHESIOLOGY

## 2021-08-12 PROCEDURE — 88360 TUMOR IMMUNOHISTOCHEM/MANUAL: CPT | Performed by: SURGERY

## 2021-08-12 PROCEDURE — C1889 IMPLANT/INSERT DEVICE, NOC: HCPCS | Performed by: SURGERY

## 2021-08-12 PROCEDURE — 25010000002 ONDANSETRON PER 1 MG: Performed by: SURGERY

## 2021-08-12 PROCEDURE — 88309 TISSUE EXAM BY PATHOLOGIST: CPT | Performed by: SURGERY

## 2021-08-12 PROCEDURE — 0WJP4ZZ INSPECTION OF GASTROINTESTINAL TRACT, PERCUTANEOUS ENDOSCOPIC APPROACH: ICD-10-PCS | Performed by: SURGERY

## 2021-08-12 PROCEDURE — 94799 UNLISTED PULMONARY SVC/PX: CPT

## 2021-08-12 PROCEDURE — 25010000002 HYDRALAZINE PER 20 MG: Performed by: NURSE ANESTHETIST, CERTIFIED REGISTERED

## 2021-08-12 PROCEDURE — 82962 GLUCOSE BLOOD TEST: CPT

## 2021-08-12 PROCEDURE — 25010000002 DEXAMETHASONE PER 1 MG: Performed by: NURSE ANESTHETIST, CERTIFIED REGISTERED

## 2021-08-12 PROCEDURE — 25010000002 FENTANYL CITRATE (PF) 50 MCG/ML SOLUTION: Performed by: NURSE ANESTHETIST, CERTIFIED REGISTERED

## 2021-08-12 PROCEDURE — 88312 SPECIAL STAINS GROUP 1: CPT | Performed by: SURGERY

## 2021-08-12 PROCEDURE — 25010000003 AMPICILLIN-SULBACTAM PER 1.5 G: Performed by: SURGERY

## 2021-08-12 PROCEDURE — 25010000002 HYDROMORPHONE PER 4 MG: Performed by: NURSE ANESTHETIST, CERTIFIED REGISTERED

## 2021-08-12 PROCEDURE — 25010000003 MEPERIDINE PER 100 MG: Performed by: NURSE ANESTHETIST, CERTIFIED REGISTERED

## 2021-08-12 PROCEDURE — 0DBB0ZX EXCISION OF ILEUM, OPEN APPROACH, DIAGNOSTIC: ICD-10-PCS | Performed by: SURGERY

## 2021-08-12 PROCEDURE — 25010000002 ONDANSETRON PER 1 MG: Performed by: NURSE ANESTHETIST, CERTIFIED REGISTERED

## 2021-08-12 PROCEDURE — 88341 IMHCHEM/IMCYTCHM EA ADD ANTB: CPT | Performed by: SURGERY

## 2021-08-12 PROCEDURE — 88342 IMHCHEM/IMCYTCHM 1ST ANTB: CPT | Performed by: SURGERY

## 2021-08-12 DEVICE — PROXIMATE LINEAR CUTTER RELOAD (STNADARD) , BLUE, 55MM
Type: IMPLANTABLE DEVICE | Site: ILEUM | Status: FUNCTIONAL
Brand: PROXIMATE

## 2021-08-12 DEVICE — PROXIMATE RELOADABLE LINEAR CUTTER WITH SAFETY LOCK-OUT.  55MM LINEAR CUTTER.
Type: IMPLANTABLE DEVICE | Site: ILEUM | Status: FUNCTIONAL
Brand: PROXIMATE

## 2021-08-12 DEVICE — PROXIMATE RELOADABLE LINEAR STAPLER, 30MM
Type: IMPLANTABLE DEVICE | Site: ILEUM | Status: FUNCTIONAL
Brand: PROXIMATE

## 2021-08-12 RX ORDER — DEXTROSE, SODIUM CHLORIDE, AND POTASSIUM CHLORIDE 5; .45; .15 G/100ML; G/100ML; G/100ML
100 INJECTION INTRAVENOUS CONTINUOUS
Status: DISCONTINUED | OUTPATIENT
Start: 2021-08-12 | End: 2021-08-13

## 2021-08-12 RX ORDER — LABETALOL HYDROCHLORIDE 5 MG/ML
INJECTION, SOLUTION INTRAVENOUS AS NEEDED
Status: DISCONTINUED | OUTPATIENT
Start: 2021-08-12 | End: 2021-08-12 | Stop reason: SURG

## 2021-08-12 RX ORDER — SODIUM CHLORIDE, SODIUM LACTATE, POTASSIUM CHLORIDE, CALCIUM CHLORIDE 600; 310; 30; 20 MG/100ML; MG/100ML; MG/100ML; MG/100ML
INJECTION, SOLUTION INTRAVENOUS CONTINUOUS PRN
Status: DISCONTINUED | OUTPATIENT
Start: 2021-08-12 | End: 2021-08-12 | Stop reason: SURG

## 2021-08-12 RX ORDER — ROCURONIUM BROMIDE 10 MG/ML
INJECTION, SOLUTION INTRAVENOUS AS NEEDED
Status: DISCONTINUED | OUTPATIENT
Start: 2021-08-12 | End: 2021-08-12 | Stop reason: SURG

## 2021-08-12 RX ORDER — DEXAMETHASONE SODIUM PHOSPHATE 10 MG/ML
INJECTION INTRAMUSCULAR; INTRAVENOUS AS NEEDED
Status: DISCONTINUED | OUTPATIENT
Start: 2021-08-12 | End: 2021-08-12 | Stop reason: SURG

## 2021-08-12 RX ORDER — AMLODIPINE BESYLATE 5 MG/1
5 TABLET ORAL
Status: DISCONTINUED | OUTPATIENT
Start: 2021-08-12 | End: 2021-08-16

## 2021-08-12 RX ORDER — MORPHINE SULFATE 2 MG/ML
2 INJECTION, SOLUTION INTRAMUSCULAR; INTRAVENOUS EVERY 4 HOURS PRN
Status: DISCONTINUED | OUTPATIENT
Start: 2021-08-12 | End: 2021-08-18 | Stop reason: HOSPADM

## 2021-08-12 RX ORDER — DEXAMETHASONE SODIUM PHOSPHATE 4 MG/ML
INJECTION, SOLUTION INTRA-ARTICULAR; INTRALESIONAL; INTRAMUSCULAR; INTRAVENOUS; SOFT TISSUE
Status: COMPLETED | OUTPATIENT
Start: 2021-08-12 | End: 2021-08-12

## 2021-08-12 RX ORDER — FESOTERODINE FUMARATE 8 MG/1
8 TABLET, EXTENDED RELEASE ORAL
COMMUNITY

## 2021-08-12 RX ORDER — SODIUM CHLORIDE, SODIUM LACTATE, POTASSIUM CHLORIDE, CALCIUM CHLORIDE 600; 310; 30; 20 MG/100ML; MG/100ML; MG/100ML; MG/100ML
125 INJECTION, SOLUTION INTRAVENOUS ONCE
Status: COMPLETED | OUTPATIENT
Start: 2021-08-12 | End: 2021-08-12

## 2021-08-12 RX ORDER — HYDROMORPHONE HCL 110MG/55ML
PATIENT CONTROLLED ANALGESIA SYRINGE INTRAVENOUS AS NEEDED
Status: DISCONTINUED | OUTPATIENT
Start: 2021-08-12 | End: 2021-08-12 | Stop reason: SURG

## 2021-08-12 RX ORDER — LOSARTAN POTASSIUM 25 MG/1
25 TABLET ORAL DAILY
Status: DISCONTINUED | OUTPATIENT
Start: 2021-08-12 | End: 2021-08-12

## 2021-08-12 RX ORDER — HYDROCODONE BITARTRATE AND ACETAMINOPHEN 5; 325 MG/1; MG/1
1 TABLET ORAL EVERY 6 HOURS PRN
Status: DISCONTINUED | OUTPATIENT
Start: 2021-08-12 | End: 2021-08-18 | Stop reason: HOSPADM

## 2021-08-12 RX ORDER — ONDANSETRON 2 MG/ML
4 INJECTION INTRAMUSCULAR; INTRAVENOUS EVERY 6 HOURS PRN
Status: DISCONTINUED | OUTPATIENT
Start: 2021-08-12 | End: 2021-08-14

## 2021-08-12 RX ORDER — ATORVASTATIN CALCIUM 10 MG/1
10 TABLET, FILM COATED ORAL NIGHTLY
Status: DISCONTINUED | OUTPATIENT
Start: 2021-08-12 | End: 2021-08-18 | Stop reason: HOSPADM

## 2021-08-12 RX ORDER — FENTANYL CITRATE 50 UG/ML
INJECTION, SOLUTION INTRAMUSCULAR; INTRAVENOUS AS NEEDED
Status: DISCONTINUED | OUTPATIENT
Start: 2021-08-12 | End: 2021-08-12 | Stop reason: SURG

## 2021-08-12 RX ORDER — LOSARTAN POTASSIUM 50 MG/1
50 TABLET ORAL DAILY
Status: DISCONTINUED | OUTPATIENT
Start: 2021-08-12 | End: 2021-08-15

## 2021-08-12 RX ORDER — LOSARTAN POTASSIUM 50 MG/1
50 TABLET ORAL DAILY
COMMUNITY

## 2021-08-12 RX ORDER — SODIUM CHLORIDE 0.9 % (FLUSH) 0.9 %
10 SYRINGE (ML) INJECTION EVERY 12 HOURS SCHEDULED
Status: DISCONTINUED | OUTPATIENT
Start: 2021-08-12 | End: 2021-08-12 | Stop reason: HOSPADM

## 2021-08-12 RX ORDER — FAMOTIDINE 10 MG/ML
INJECTION, SOLUTION INTRAVENOUS AS NEEDED
Status: DISCONTINUED | OUTPATIENT
Start: 2021-08-12 | End: 2021-08-12 | Stop reason: SURG

## 2021-08-12 RX ORDER — HEPARIN SODIUM 5000 [USP'U]/ML
5000 INJECTION, SOLUTION INTRAVENOUS; SUBCUTANEOUS EVERY 8 HOURS SCHEDULED
Status: DISCONTINUED | OUTPATIENT
Start: 2021-08-13 | End: 2021-08-17

## 2021-08-12 RX ORDER — GLYCOPYRROLATE 0.2 MG/ML
INJECTION INTRAMUSCULAR; INTRAVENOUS AS NEEDED
Status: DISCONTINUED | OUTPATIENT
Start: 2021-08-12 | End: 2021-08-12 | Stop reason: SURG

## 2021-08-12 RX ORDER — MIDAZOLAM HYDROCHLORIDE 1 MG/ML
0.5 INJECTION INTRAMUSCULAR; INTRAVENOUS
Status: DISCONTINUED | OUTPATIENT
Start: 2021-08-12 | End: 2021-08-12 | Stop reason: HOSPADM

## 2021-08-12 RX ORDER — ETOMIDATE 2 MG/ML
INJECTION INTRAVENOUS AS NEEDED
Status: DISCONTINUED | OUTPATIENT
Start: 2021-08-12 | End: 2021-08-12 | Stop reason: SURG

## 2021-08-12 RX ORDER — OXYBUTYNIN CHLORIDE 5 MG/1
10 TABLET, EXTENDED RELEASE ORAL DAILY
Status: CANCELLED | OUTPATIENT
Start: 2021-08-12

## 2021-08-12 RX ORDER — MEPERIDINE HYDROCHLORIDE 25 MG/ML
INJECTION INTRAMUSCULAR; INTRAVENOUS; SUBCUTANEOUS AS NEEDED
Status: DISCONTINUED | OUTPATIENT
Start: 2021-08-12 | End: 2021-08-12 | Stop reason: SURG

## 2021-08-12 RX ORDER — MAGNESIUM HYDROXIDE 1200 MG/15ML
LIQUID ORAL AS NEEDED
Status: DISCONTINUED | OUTPATIENT
Start: 2021-08-12 | End: 2021-08-12 | Stop reason: HOSPADM

## 2021-08-12 RX ORDER — ASPIRIN 81 MG/1
81 TABLET, CHEWABLE ORAL DAILY
Status: DISCONTINUED | OUTPATIENT
Start: 2021-08-12 | End: 2021-08-17

## 2021-08-12 RX ORDER — CARVEDILOL 6.25 MG/1
12.5 TABLET ORAL 2 TIMES DAILY WITH MEALS
Status: DISCONTINUED | OUTPATIENT
Start: 2021-08-12 | End: 2021-08-16

## 2021-08-12 RX ORDER — OXYBUTYNIN CHLORIDE 5 MG/1
10 TABLET, EXTENDED RELEASE ORAL DAILY
Status: DISCONTINUED | OUTPATIENT
Start: 2021-08-12 | End: 2021-08-18 | Stop reason: HOSPADM

## 2021-08-12 RX ORDER — NALOXONE HCL 0.4 MG/ML
0.4 VIAL (ML) INJECTION
Status: DISCONTINUED | OUTPATIENT
Start: 2021-08-12 | End: 2021-08-18 | Stop reason: HOSPADM

## 2021-08-12 RX ORDER — SODIUM CHLORIDE 0.9 % (FLUSH) 0.9 %
10 SYRINGE (ML) INJECTION AS NEEDED
Status: DISCONTINUED | OUTPATIENT
Start: 2021-08-12 | End: 2021-08-12 | Stop reason: HOSPADM

## 2021-08-12 RX ORDER — ONDANSETRON 2 MG/ML
INJECTION INTRAMUSCULAR; INTRAVENOUS AS NEEDED
Status: DISCONTINUED | OUTPATIENT
Start: 2021-08-12 | End: 2021-08-12 | Stop reason: SURG

## 2021-08-12 RX ORDER — NEOSTIGMINE METHYLSULFATE 1 MG/ML
INJECTION, SOLUTION INTRAVENOUS AS NEEDED
Status: DISCONTINUED | OUTPATIENT
Start: 2021-08-12 | End: 2021-08-12 | Stop reason: SURG

## 2021-08-12 RX ORDER — HYDRALAZINE HYDROCHLORIDE 20 MG/ML
INJECTION INTRAMUSCULAR; INTRAVENOUS AS NEEDED
Status: DISCONTINUED | OUTPATIENT
Start: 2021-08-12 | End: 2021-08-12 | Stop reason: SURG

## 2021-08-12 RX ORDER — METOPROLOL TARTRATE 5 MG/5ML
INJECTION INTRAVENOUS AS NEEDED
Status: DISCONTINUED | OUTPATIENT
Start: 2021-08-12 | End: 2021-08-12 | Stop reason: SURG

## 2021-08-12 RX ORDER — BUPIVACAINE HYDROCHLORIDE 5 MG/ML
INJECTION, SOLUTION EPIDURAL; INTRACAUDAL
Status: COMPLETED | OUTPATIENT
Start: 2021-08-12 | End: 2021-08-12

## 2021-08-12 RX ORDER — ONDANSETRON 4 MG/1
4 TABLET, FILM COATED ORAL EVERY 6 HOURS PRN
Status: DISCONTINUED | OUTPATIENT
Start: 2021-08-12 | End: 2021-08-14

## 2021-08-12 RX ADMIN — HYDRALAZINE HYDROCHLORIDE 5 MG: 20 INJECTION INTRAMUSCULAR; INTRAVENOUS at 11:47

## 2021-08-12 RX ADMIN — FAMOTIDINE 20 MG: 10 INJECTION INTRAVENOUS at 08:45

## 2021-08-12 RX ADMIN — HYDROMORPHONE HYDROCHLORIDE 0.2 MG: 2 INJECTION, SOLUTION INTRAMUSCULAR; INTRAVENOUS; SUBCUTANEOUS at 10:11

## 2021-08-12 RX ADMIN — METOPROLOL TARTRATE 2.5 MG: 1 INJECTION, SOLUTION INTRAVENOUS at 10:28

## 2021-08-12 RX ADMIN — LABETALOL HYDROCHLORIDE 5 MG: 5 INJECTION, SOLUTION INTRAVENOUS at 11:04

## 2021-08-12 RX ADMIN — HYDROMORPHONE HYDROCHLORIDE 0.2 MG: 2 INJECTION, SOLUTION INTRAMUSCULAR; INTRAVENOUS; SUBCUTANEOUS at 09:42

## 2021-08-12 RX ADMIN — METOPROLOL TARTRATE 2 MG: 1 INJECTION, SOLUTION INTRAVENOUS at 09:17

## 2021-08-12 RX ADMIN — HYDROMORPHONE HYDROCHLORIDE 0.2 MG: 2 INJECTION, SOLUTION INTRAMUSCULAR; INTRAVENOUS; SUBCUTANEOUS at 09:23

## 2021-08-12 RX ADMIN — SODIUM CHLORIDE, POTASSIUM CHLORIDE, SODIUM LACTATE AND CALCIUM CHLORIDE 125 ML/HR: 600; 310; 30; 20 INJECTION, SOLUTION INTRAVENOUS at 08:16

## 2021-08-12 RX ADMIN — LOSARTAN POTASSIUM 50 MG: 50 TABLET, FILM COATED ORAL at 17:21

## 2021-08-12 RX ADMIN — ROCURONIUM BROMIDE 40 MG: 10 SOLUTION INTRAVENOUS at 08:51

## 2021-08-12 RX ADMIN — HYDROMORPHONE HYDROCHLORIDE 0.6 MG: 2 INJECTION, SOLUTION INTRAMUSCULAR; INTRAVENOUS; SUBCUTANEOUS at 10:29

## 2021-08-12 RX ADMIN — FENTANYL CITRATE 50 MCG: 50 INJECTION INTRAMUSCULAR; INTRAVENOUS at 10:32

## 2021-08-12 RX ADMIN — POTASSIUM CHLORIDE, DEXTROSE MONOHYDRATE AND SODIUM CHLORIDE 100 ML/HR: 150; 5; 450 INJECTION, SOLUTION INTRAVENOUS at 15:10

## 2021-08-12 RX ADMIN — HYDROMORPHONE HYDROCHLORIDE 0.2 MG: 2 INJECTION, SOLUTION INTRAMUSCULAR; INTRAVENOUS; SUBCUTANEOUS at 09:34

## 2021-08-12 RX ADMIN — DEXAMETHASONE SODIUM PHOSPHATE 4 MG: 10 INJECTION INTRAMUSCULAR; INTRAVENOUS at 08:51

## 2021-08-12 RX ADMIN — ONDANSETRON 4 MG: 2 INJECTION INTRAMUSCULAR; INTRAVENOUS at 12:43

## 2021-08-12 RX ADMIN — ATORVASTATIN CALCIUM 10 MG: 10 TABLET, FILM COATED ORAL at 19:58

## 2021-08-12 RX ADMIN — ETOMIDATE 10 MG: 2 INJECTION, SOLUTION INTRAVENOUS at 08:51

## 2021-08-12 RX ADMIN — FENTANYL CITRATE 100 MCG: 50 INJECTION INTRAMUSCULAR; INTRAVENOUS at 08:45

## 2021-08-12 RX ADMIN — HYDROMORPHONE HYDROCHLORIDE 0.2 MG: 2 INJECTION, SOLUTION INTRAMUSCULAR; INTRAVENOUS; SUBCUTANEOUS at 10:19

## 2021-08-12 RX ADMIN — BUPIVACAINE HYDROCHLORIDE 30 ML: 5 INJECTION, SOLUTION EPIDURAL; INTRACAUDAL; PERINEURAL at 09:03

## 2021-08-12 RX ADMIN — GLYCOPYRROLATE 0.4 MG: 0.2 INJECTION INTRAMUSCULAR; INTRAVENOUS at 10:08

## 2021-08-12 RX ADMIN — SODIUM CHLORIDE, POTASSIUM CHLORIDE, SODIUM LACTATE AND CALCIUM CHLORIDE: 600; 310; 30; 20 INJECTION, SOLUTION INTRAVENOUS at 09:40

## 2021-08-12 RX ADMIN — HYDROCODONE BITARTRATE AND ACETAMINOPHEN 1 TABLET: 5; 325 TABLET ORAL at 22:38

## 2021-08-12 RX ADMIN — HYDROMORPHONE HYDROCHLORIDE 0.2 MG: 2 INJECTION, SOLUTION INTRAMUSCULAR; INTRAVENOUS; SUBCUTANEOUS at 10:02

## 2021-08-12 RX ADMIN — FENTANYL CITRATE 50 MCG: 50 INJECTION INTRAMUSCULAR; INTRAVENOUS at 10:36

## 2021-08-12 RX ADMIN — NEOSTIGMINE 2 MG: 1 INJECTION INTRAVENOUS at 10:08

## 2021-08-12 RX ADMIN — HYDROMORPHONE HYDROCHLORIDE 0.2 MG: 2 INJECTION, SOLUTION INTRAMUSCULAR; INTRAVENOUS; SUBCUTANEOUS at 09:52

## 2021-08-12 RX ADMIN — AMPICILLIN SODIUM AND SULBACTAM SODIUM 3 G: 2; 1 INJECTION, POWDER, FOR SOLUTION INTRAMUSCULAR; INTRAVENOUS at 08:58

## 2021-08-12 RX ADMIN — CARVEDILOL 12.5 MG: 6.25 TABLET, FILM COATED ORAL at 17:21

## 2021-08-12 RX ADMIN — OXYBUTYNIN CHLORIDE 10 MG: 5 TABLET, EXTENDED RELEASE ORAL at 17:21

## 2021-08-12 RX ADMIN — SODIUM CHLORIDE, POTASSIUM CHLORIDE, SODIUM LACTATE AND CALCIUM CHLORIDE: 600; 310; 30; 20 INJECTION, SOLUTION INTRAVENOUS at 08:45

## 2021-08-12 RX ADMIN — ONDANSETRON 4 MG: 2 INJECTION INTRAMUSCULAR; INTRAVENOUS at 08:45

## 2021-08-12 RX ADMIN — DEXAMETHASONE SODIUM PHOSPHATE 4 MG: 4 INJECTION, SOLUTION INTRA-ARTICULAR; INTRALESIONAL; INTRAMUSCULAR; INTRAVENOUS; SOFT TISSUE at 09:03

## 2021-08-12 RX ADMIN — MEPERIDINE HYDROCHLORIDE 25 MG: 25 INJECTION INTRAMUSCULAR; INTRAVENOUS; SUBCUTANEOUS at 10:38

## 2021-08-12 NOTE — CASE MANAGEMENT/SOCIAL WORK
Discharge Planning Assessment  [facility]     Patient Name: Rachana Justin  MRN: [id number]  Today's Date: 8/12/2021    Admit Date: 8/12/2021    Discharge Needs Assessment     Row Name 08/12/21 1548       Living Environment    Lives With  spouse    Name(s) of Who Lives With Patient  Lives w/ spouse, Garth    Current Living Arrangements  home/apartment/condo    Primary Care Provided by  self    Provides Primary Care For  no one    Family Caregiver if Needed  child(adam), adult    Family Caregiver Names  dtr-Mariel    Quality of Family Relationships  helpful;involved;supportive    Able to Return to Prior Arrangements  yes       Resource/Environmental Concerns    Resource/Environmental Concerns  none    Transportation Concerns  car, none       Transition Planning    Patient/Family Anticipates Transition to  home with family    Patient/Family Anticipated Services at Transition  none    Transportation Anticipated  family or friend will provide       Discharge Needs Assessment    Equipment Currently Used at Home  cane, straight;walker, rolling    Concerns to be Addressed  no discharge needs identified    Anticipated Changes Related to Illness  none    Equipment Needed After Discharge  none        Discharge Plan     Row Name 08/12/21 0476       Plan    Plan Pt lives at home with spouse, Garth. Pt's daughter, Mariel and other children help at home. Pt does not utilize  at this time. Pt has cane and rolling walker. Pt's PCP is Dr. De La Rosa. Pt does not have a POA or living will. Pt's family to provide transportation home at discharge. SS to follow and assist.    Patient/Family in Agreement with Plan  yes        Demographic Summary     Row Name 08/12/21 1548       General Information    Referral Source  nursing    Reason for Consult  -- d/c planning per protocol r/t age may need HH        TORI Bird

## 2021-08-12 NOTE — ANESTHESIA PROCEDURE NOTES
Airway  Urgency: elective    Date/Time: 8/12/2021 8:54 AM  Airway not difficult    General Information and Staff    Patient location during procedure: OR  Anesthesiologist: Isidro Reed MD  CRNA: Yolande Smith CRNA    Indications and Patient Condition  Indications for airway management: airway protection    Preoxygenated: yes  MILS maintained throughout  Mask difficulty assessment: 0 - not attempted    Final Airway Details  Final airway type: endotracheal airway      Successful airway: ETT  Cuffed: yes   Successful intubation technique: direct laryngoscopy  Endotracheal tube insertion site: oral  Blade: Chavis  Blade size: 2  ETT size (mm): 7.0  Cormack-Lehane Classification: grade I - full view of glottis  Placement verified by: chest auscultation   Measured from: lips  ETT/EBT  to lips (cm): 21  Number of attempts at approach: 1  Assessment: lips, teeth, and gum same as pre-op and atraumatic intubation

## 2021-08-12 NOTE — NURSING NOTE
SSI Colon protocol followed.  Alcohol based skin prep used, bowel isolation technique used. There was no gross contamination. Staff re-gowned, re-gloved, and change instruments, bovie.

## 2021-08-12 NOTE — ANESTHESIA PROCEDURE NOTES
"Peripheral Block      Patient reassessed immediately prior to procedure    Patient location during procedure: OR  Start time: 8/12/2021 9:03 AM  Stop time: 8/12/2021 9:07 AM  Reason for block: at surgeon's request and post-op pain management  Performed by  CRNA: Yolande Smith CRNA  Preanesthetic Checklist  Completed: patient identified, IV checked, site marked, risks and benefits discussed, surgical consent, monitors and equipment checked, pre-op evaluation and timeout performed  Prep:  Pt Position: supine  Sterile barriers:cap, gloves, sterile barriers and mask  Prep: ChloraPrep  Patient monitoring: blood pressure monitoring, continuous pulse oximetry and EKG  Procedure  Nursing cardiac assessment comments yes: Sedation, GA, Spinal,Epidural   Performed under: general  Guidance:ultrasound guided  ULTRASOUND INTERPRETATION. Using ultrasound guidance a 20 G (20g 4\" Stimuplex) gauge needle was placed in close proximity to the nerve, at which point, under ultrasound guidance anesthetic was injected in the area of the nerve and spread of the anesthesia was seen on ultrasound in close proximity thereto.  There were no abnormalities seen on ultrasound; a digital image was taken; and the patient tolerated the procedure with no complications. Images:still images obtained    Laterality:Bilateral  Block Type:TAP  Injection Technique:single-shot  Needle Type:short-bevel  Needle Gauge:20 G  Resistance on Injection: none    Medications Used: bupivacaine PF (MARCAINE) injection 0.5%, 30 mL  dexamethasone (DECADRON) injection, 4 mg  Med admintered at 8/12/2021 9:03 AM      Medications  Preservative Free Saline:10ml  Comment:Block Injection:  Total volume divided equally between Right and Left block        Post Assessment  Injection Assessment: negative aspiration for heme, incremental injection and no paresthesia on injection  Patient Tolerance:comfortable throughout block  Complications:no  Additional Notes  The pt was in the " supine position under general anesthesia.    Under Ultrasound guidance, a BBraun 4inch 360 degree needle was advanced with Normal Saline hydro dissection of tissue.  The Internal Oblique and Transversus Abdominus muscles where visualized.  At or before the aponeurosis of Internal Oblique, local anesthetic spread was visualized in the Transversus Abdominus Plane. Injection was made incrementally with aspiration every 5 mls.  There was no  intravascular injection,  injection pressure was normal, there was no neural injection, and the procedure was completed without difficulty. The same procedure was completed for left and right sided tap blocks. Thank You.

## 2021-08-12 NOTE — PLAN OF CARE
Goal Outcome Evaluation:   New admit from OR. She has c/o nausea. PRN med given. Patient is resting at this time with no new complaints.

## 2021-08-12 NOTE — ANESTHESIA POSTPROCEDURE EVALUATION
Patient: Rachana Justin    Procedure Summary     Date: 08/12/21 Room / Location:  [facility] OR 02 /  [facility]    Anesthesia Start: 0848 Anesthesia Stop: 1043    Procedure: DIAGNOSTIC LAPAROSCOPY CONVERTED TO  OPEN @ 0918 WITH SMALL BOWEL RESECTION (N/A Abdomen) Diagnosis:       Mesenteric lymphadenopathy      (Mesenteric lymphadenopathy [R59.0])    Surgeons: Troy Elkins MD Provider: Isidro Reed MD    Anesthesia Type: general with block ASA Status: 3          Anesthesia Type: general with block    Vitals  Vitals Value Taken Time   /78 08/12/21 1204   Temp 97.1 °F (36.2 °C) 08/12/21 1134   Pulse 70 08/12/21 1204   Resp 12 08/12/21 1204   SpO2 97 % 08/12/21 1204           Post Anesthesia Care and Evaluation    Patient location during evaluation: PACU  Patient participation: complete - patient participated  Level of consciousness: awake  Pain score: 1  Pain management: adequate  Airway patency: patent  Anesthetic complications: No anesthetic complications  PONV Status: none  Cardiovascular status: acceptable  Respiratory status: acceptable  Hydration status: acceptable

## 2021-08-12 NOTE — ANESTHESIA PREPROCEDURE EVALUATION
Anesthesia Evaluation     Patient summary reviewed and Nursing notes reviewed   no history of anesthetic complications:  NPO Solid Status: > 8 hours  NPO Liquid Status: > 8 hours           Airway   Mallampati: II  TM distance: >3 FB  Neck ROM: full  No difficulty expected  Dental    (+) edentulous    Pulmonary - negative pulmonary ROS    breath sounds clear to auscultation  Cardiovascular   Exercise tolerance: poor (<4 METS)    Rhythm: regular  Rate: normal    (+) hypertension, dysrhythmias Atrial Fib, CHF , hyperlipidemia,       Neuro/Psych- negative ROS  GI/Hepatic/Renal/Endo    (+)  GERD,  diabetes mellitus type 2 well controlled,     Musculoskeletal     (+) arthralgias, chronic pain, gait problem, neck stiffness,   Abdominal   (+) scaphoid   Substance History - negative use     OB/GYN          Other   arthritis,      ROS/Med Hx Other: WT loss  GB stones                    Anesthesia Plan    ASA 3     general with block     intravenous induction     Anesthetic plan, all risks, benefits, and alternatives have been provided, discussed and informed consent has been obtained with: patient.  Use of blood products discussed with consented to blood products.   Plan discussed with CRNA.

## 2021-08-13 LAB
ANION GAP SERPL CALCULATED.3IONS-SCNC: 7.4 MMOL/L (ref 5–15)
ANION GAP SERPL CALCULATED.3IONS-SCNC: 9.8 MMOL/L (ref 5–15)
BASOPHILS # BLD AUTO: 0.01 10*3/MM3 (ref 0–0.2)
BASOPHILS NFR BLD AUTO: 0.1 % (ref 0–1.5)
BUN SERPL-MCNC: 28 MG/DL (ref 8–23)
BUN SERPL-MCNC: 35 MG/DL (ref 8–23)
BUN/CREAT SERPL: 26.7 (ref 7–25)
BUN/CREAT SERPL: 33.7 (ref 7–25)
CALCIUM SPEC-SCNC: 9.4 MG/DL (ref 8.6–10.5)
CALCIUM SPEC-SCNC: 9.5 MG/DL (ref 8.6–10.5)
CHLORIDE SERPL-SCNC: 100 MMOL/L (ref 98–107)
CHLORIDE SERPL-SCNC: 101 MMOL/L (ref 98–107)
CO2 SERPL-SCNC: 19.2 MMOL/L (ref 22–29)
CO2 SERPL-SCNC: 21.6 MMOL/L (ref 22–29)
CREAT SERPL-MCNC: 1.04 MG/DL (ref 0.57–1)
CREAT SERPL-MCNC: 1.05 MG/DL (ref 0.57–1)
DEPRECATED RDW RBC AUTO: 51.2 FL (ref 37–54)
EOSINOPHIL # BLD AUTO: 0 10*3/MM3 (ref 0–0.4)
EOSINOPHIL NFR BLD AUTO: 0 % (ref 0.3–6.2)
ERYTHROCYTE [DISTWIDTH] IN BLOOD BY AUTOMATED COUNT: 15.3 % (ref 12.3–15.4)
GFR SERPL CREATININE-BSD FRML MDRD: 50 ML/MIN/1.73
GFR SERPL CREATININE-BSD FRML MDRD: 51 ML/MIN/1.73
GLUCOSE BLDC GLUCOMTR-MCNC: 232 MG/DL (ref 70–130)
GLUCOSE BLDC GLUCOMTR-MCNC: 289 MG/DL (ref 70–130)
GLUCOSE BLDC GLUCOMTR-MCNC: 348 MG/DL (ref 70–130)
GLUCOSE BLDC GLUCOMTR-MCNC: 416 MG/DL (ref 70–130)
GLUCOSE BLDC GLUCOMTR-MCNC: 467 MG/DL (ref 70–130)
GLUCOSE SERPL-MCNC: 361 MG/DL (ref 65–99)
GLUCOSE SERPL-MCNC: 381 MG/DL (ref 65–99)
HBA1C MFR BLD: 8.2 % (ref 4.8–5.6)
HCT VFR BLD AUTO: 36 % (ref 34–46.6)
HGB BLD-MCNC: 11 G/DL (ref 12–15.9)
IMM GRANULOCYTES # BLD AUTO: 0.04 10*3/MM3 (ref 0–0.05)
IMM GRANULOCYTES NFR BLD AUTO: 0.3 % (ref 0–0.5)
LYMPHOCYTES # BLD AUTO: 0.83 10*3/MM3 (ref 0.7–3.1)
LYMPHOCYTES NFR BLD AUTO: 7.1 % (ref 19.6–45.3)
MCH RBC QN AUTO: 27.9 PG (ref 26.6–33)
MCHC RBC AUTO-ENTMCNC: 30.6 G/DL (ref 31.5–35.7)
MCV RBC AUTO: 91.4 FL (ref 79–97)
MONOCYTES # BLD AUTO: 0.75 10*3/MM3 (ref 0.1–0.9)
MONOCYTES NFR BLD AUTO: 6.4 % (ref 5–12)
NEUTROPHILS NFR BLD AUTO: 10.06 10*3/MM3 (ref 1.7–7)
NEUTROPHILS NFR BLD AUTO: 86.1 % (ref 42.7–76)
NRBC BLD AUTO-RTO: 0 /100 WBC (ref 0–0.2)
PLATELET # BLD AUTO: 137 10*3/MM3 (ref 140–450)
PMV BLD AUTO: 11.4 FL (ref 6–12)
POTASSIUM SERPL-SCNC: 5.1 MMOL/L (ref 3.5–5.2)
POTASSIUM SERPL-SCNC: 5.5 MMOL/L (ref 3.5–5.2)
POTASSIUM SERPL-SCNC: 5.5 MMOL/L (ref 3.5–5.2)
RBC # BLD AUTO: 3.94 10*6/MM3 (ref 3.77–5.28)
SODIUM SERPL-SCNC: 129 MMOL/L (ref 136–145)
SODIUM SERPL-SCNC: 130 MMOL/L (ref 136–145)
WBC # BLD AUTO: 11.69 10*3/MM3 (ref 3.4–10.8)

## 2021-08-13 PROCEDURE — 82962 GLUCOSE BLOOD TEST: CPT

## 2021-08-13 PROCEDURE — 25010000002 HEPARIN (PORCINE) PER 1000 UNITS: Performed by: SURGERY

## 2021-08-13 PROCEDURE — 93005 ELECTROCARDIOGRAM TRACING: CPT | Performed by: SURGERY

## 2021-08-13 PROCEDURE — 63710000001 INSULIN ASPART PER 5 UNITS: Performed by: SURGERY

## 2021-08-13 PROCEDURE — 80048 BASIC METABOLIC PNL TOTAL CA: CPT | Performed by: SURGERY

## 2021-08-13 PROCEDURE — 99024 POSTOP FOLLOW-UP VISIT: CPT | Performed by: SURGERY

## 2021-08-13 PROCEDURE — 25010000002 MORPHINE PER 10 MG: Performed by: SURGERY

## 2021-08-13 PROCEDURE — 94799 UNLISTED PULMONARY SVC/PX: CPT

## 2021-08-13 PROCEDURE — 63710000001 INSULIN REGULAR HUMAN PER 5 UNITS: Performed by: SURGERY

## 2021-08-13 PROCEDURE — 83036 HEMOGLOBIN GLYCOSYLATED A1C: CPT | Performed by: SURGERY

## 2021-08-13 PROCEDURE — 25010000002 CALCIUM GLUCONATE-NACL 1-0.675 GM/50ML-% SOLUTION: Performed by: SURGERY

## 2021-08-13 PROCEDURE — 85025 COMPLETE CBC W/AUTO DIFF WBC: CPT | Performed by: SURGERY

## 2021-08-13 PROCEDURE — 93010 ELECTROCARDIOGRAM REPORT: CPT | Performed by: INTERNAL MEDICINE

## 2021-08-13 PROCEDURE — 84132 ASSAY OF SERUM POTASSIUM: CPT | Performed by: SURGERY

## 2021-08-13 RX ORDER — DEXTROSE MONOHYDRATE 25 G/50ML
25 INJECTION, SOLUTION INTRAVENOUS
Status: DISCONTINUED | OUTPATIENT
Start: 2021-08-13 | End: 2021-08-16

## 2021-08-13 RX ORDER — CALCIUM GLUCONATE 20 MG/ML
1 INJECTION, SOLUTION INTRAVENOUS ONCE
Status: COMPLETED | OUTPATIENT
Start: 2021-08-13 | End: 2021-08-13

## 2021-08-13 RX ORDER — DEXTROSE MONOHYDRATE 25 G/50ML
25 INJECTION, SOLUTION INTRAVENOUS
Status: DISCONTINUED | OUTPATIENT
Start: 2021-08-13 | End: 2021-08-18 | Stop reason: HOSPADM

## 2021-08-13 RX ORDER — DEXTROSE AND SODIUM CHLORIDE 5; .45 G/100ML; G/100ML
100 INJECTION, SOLUTION INTRAVENOUS CONTINUOUS
Status: DISCONTINUED | OUTPATIENT
Start: 2021-08-13 | End: 2021-08-13

## 2021-08-13 RX ORDER — NICOTINE POLACRILEX 4 MG
15 LOZENGE BUCCAL
Status: DISCONTINUED | OUTPATIENT
Start: 2021-08-13 | End: 2021-08-16

## 2021-08-13 RX ORDER — SODIUM CHLORIDE 9 MG/ML
100 INJECTION, SOLUTION INTRAVENOUS CONTINUOUS
Status: DISCONTINUED | OUTPATIENT
Start: 2021-08-13 | End: 2021-08-17

## 2021-08-13 RX ORDER — NICOTINE POLACRILEX 4 MG
15 LOZENGE BUCCAL
Status: DISCONTINUED | OUTPATIENT
Start: 2021-08-13 | End: 2021-08-18 | Stop reason: HOSPADM

## 2021-08-13 RX ADMIN — LOSARTAN POTASSIUM 50 MG: 50 TABLET, FILM COATED ORAL at 08:11

## 2021-08-13 RX ADMIN — INSULIN ASPART 7 UNITS: 100 INJECTION, SOLUTION INTRAVENOUS; SUBCUTANEOUS at 16:41

## 2021-08-13 RX ADMIN — HEPARIN SODIUM 5000 UNITS: 5000 INJECTION INTRAVENOUS; SUBCUTANEOUS at 13:55

## 2021-08-13 RX ADMIN — SODIUM BICARBONATE 50 MEQ: 84 INJECTION INTRAVENOUS at 19:40

## 2021-08-13 RX ADMIN — OXYBUTYNIN CHLORIDE 10 MG: 5 TABLET, EXTENDED RELEASE ORAL at 08:11

## 2021-08-13 RX ADMIN — SODIUM CHLORIDE 100 ML/HR: 9 INJECTION, SOLUTION INTRAVENOUS at 18:35

## 2021-08-13 RX ADMIN — CALCIUM GLUCONATE 1 G: 20 INJECTION, SOLUTION INTRAVENOUS at 19:46

## 2021-08-13 RX ADMIN — POTASSIUM CHLORIDE, DEXTROSE MONOHYDRATE AND SODIUM CHLORIDE 100 ML/HR: 150; 5; 450 INJECTION, SOLUTION INTRAVENOUS at 01:18

## 2021-08-13 RX ADMIN — HEPARIN SODIUM 5000 UNITS: 5000 INJECTION INTRAVENOUS; SUBCUTANEOUS at 20:45

## 2021-08-13 RX ADMIN — ASPIRIN 81 MG: 81 TABLET, CHEWABLE ORAL at 08:11

## 2021-08-13 RX ADMIN — DEXTROSE AND SODIUM CHLORIDE 100 ML/HR: 5; 450 INJECTION, SOLUTION INTRAVENOUS at 16:41

## 2021-08-13 RX ADMIN — MORPHINE SULFATE 2 MG: 2 INJECTION, SOLUTION INTRAMUSCULAR; INTRAVENOUS at 02:32

## 2021-08-13 RX ADMIN — AMLODIPINE BESYLATE 5 MG: 5 TABLET ORAL at 08:11

## 2021-08-13 RX ADMIN — HEPARIN SODIUM 5000 UNITS: 5000 INJECTION INTRAVENOUS; SUBCUTANEOUS at 06:23

## 2021-08-13 RX ADMIN — ATORVASTATIN CALCIUM 10 MG: 10 TABLET, FILM COATED ORAL at 20:44

## 2021-08-13 RX ADMIN — INSULIN ASPART 8 UNITS: 100 INJECTION, SOLUTION INTRAVENOUS; SUBCUTANEOUS at 19:40

## 2021-08-13 RX ADMIN — INSULIN ASPART 7 UNITS: 100 INJECTION, SOLUTION INTRAVENOUS; SUBCUTANEOUS at 10:51

## 2021-08-13 RX ADMIN — HUMAN INSULIN 10 UNITS: 100 INJECTION, SOLUTION SUBCUTANEOUS at 21:53

## 2021-08-13 RX ADMIN — MORPHINE SULFATE 2 MG: 2 INJECTION, SOLUTION INTRAMUSCULAR; INTRAVENOUS at 06:27

## 2021-08-13 RX ADMIN — CARVEDILOL 12.5 MG: 6.25 TABLET, FILM COATED ORAL at 17:13

## 2021-08-13 RX ADMIN — CARVEDILOL 12.5 MG: 6.25 TABLET, FILM COATED ORAL at 08:11

## 2021-08-13 NOTE — PLAN OF CARE
Goal Outcome Evaluation:  Plan of Care Reviewed With: patient        Progress: improving   Pt c/o pain x2 this shift and was given PRN pain medicine which was effective. She is stable, will monitor.

## 2021-08-13 NOTE — PLAN OF CARE
Goal Outcome Evaluation:  Plan of Care Reviewed With: patient           Outcome Summary: Patient has done well she has sat up in the chair and ambulated in the hallway. No further complaints noted.

## 2021-08-13 NOTE — PROGRESS NOTES
Pt noted to be hyperglycemic, pseudohyponatremic (adjusted Na 135), hyperkalemic. Per nursing, patient is doing relatively well. No intervention performed on K 5.5.   Transitioned to resistant SSI. Changed fluids from D5-1/2 NS to NS. Hospitalist consulted for assistance with poorly controlled DM  EKG ordered, as well as HCO3 and Ca-gluconate. I anticipate addition of basal and her ordered mealtime insulin will treat her hyperkalemia.

## 2021-08-13 NOTE — NURSING NOTE
Notified Rochelle Loyola that patient will be moved to room 331. She stated she will notify other family.

## 2021-08-13 NOTE — CASE MANAGEMENT/SOCIAL WORK
Discharge Planning Assessment   [facility]     Patient Name: Rachana Justin  MRN: [id number]  Today's Date: 8/13/2021    Admit Date: 8/12/2021      Discharge Plan     Row Name 08/13/21 1506       Plan    Plan Pt lives with her spouse and plans to return home at discharge. Pt's children help at home as needed. Pt does not utilize home health services. Pt has a cane and a rolling walker at home. SS to follow.        TORI Jarquin

## 2021-08-13 NOTE — PROGRESS NOTES
Chief complaint: Mesenteric lymphadenopathy    Postoperative day 1: Small bowel resection for mesenteric lymphadenopathy requiring biopsy and diagnosis    Patient doing well this morning. She is sitting up tolerating clear liquids but having some belching awaiting bowel function    Afebrile, vital signs stable  Clear to auscultation bilaterally  Abdomen soft, appropriately tender, midline incision clean dry and intact    81-year-old diabetic female status post laparotomy for small bowel resection 2 remove significant mesenteric lymphadenopathy.  -Continue clear liquid diet  -Ambulate frequently  -Awaiting return of bowel function

## 2021-08-14 LAB
ALBUMIN SERPL-MCNC: 2.96 G/DL (ref 3.5–5.2)
ALBUMIN/GLOB SERPL: 1.5 G/DL
ALP SERPL-CCNC: 98 U/L (ref 39–117)
ALT SERPL W P-5'-P-CCNC: 7 U/L (ref 1–33)
ANION GAP SERPL CALCULATED.3IONS-SCNC: 5.6 MMOL/L (ref 5–15)
ANION GAP SERPL CALCULATED.3IONS-SCNC: 9.1 MMOL/L (ref 5–15)
AST SERPL-CCNC: 11 U/L (ref 1–32)
BASOPHILS # BLD AUTO: 0.01 10*3/MM3 (ref 0–0.2)
BASOPHILS NFR BLD AUTO: 0.1 % (ref 0–1.5)
BILIRUB SERPL-MCNC: 0.6 MG/DL (ref 0–1.2)
BUN SERPL-MCNC: 37 MG/DL (ref 8–23)
BUN SERPL-MCNC: 38 MG/DL (ref 8–23)
BUN/CREAT SERPL: 41.1 (ref 7–25)
BUN/CREAT SERPL: 44.2 (ref 7–25)
CALCIUM SPEC-SCNC: 9.2 MG/DL (ref 8.6–10.5)
CALCIUM SPEC-SCNC: 9.6 MG/DL (ref 8.6–10.5)
CHLORIDE SERPL-SCNC: 102 MMOL/L (ref 98–107)
CHLORIDE SERPL-SCNC: 99 MMOL/L (ref 98–107)
CO2 SERPL-SCNC: 21.9 MMOL/L (ref 22–29)
CO2 SERPL-SCNC: 23.4 MMOL/L (ref 22–29)
CREAT SERPL-MCNC: 0.86 MG/DL (ref 0.57–1)
CREAT SERPL-MCNC: 0.9 MG/DL (ref 0.57–1)
DEPRECATED RDW RBC AUTO: 52.4 FL (ref 37–54)
DEPRECATED RDW RBC AUTO: 52.7 FL (ref 37–54)
EOSINOPHIL # BLD AUTO: 0 10*3/MM3 (ref 0–0.4)
EOSINOPHIL NFR BLD AUTO: 0 % (ref 0.3–6.2)
ERYTHROCYTE [DISTWIDTH] IN BLOOD BY AUTOMATED COUNT: 15.5 % (ref 12.3–15.4)
ERYTHROCYTE [DISTWIDTH] IN BLOOD BY AUTOMATED COUNT: 15.5 % (ref 12.3–15.4)
FERRITIN SERPL-MCNC: 135.1 NG/ML (ref 13–150)
FOLATE SERPL-MCNC: 7.46 NG/ML (ref 4.78–24.2)
GFR SERPL CREATININE-BSD FRML MDRD: 60 ML/MIN/1.73
GFR SERPL CREATININE-BSD FRML MDRD: 63 ML/MIN/1.73
GLOBULIN UR ELPH-MCNC: 2 GM/DL
GLUCOSE BLDC GLUCOMTR-MCNC: 166 MG/DL (ref 70–130)
GLUCOSE BLDC GLUCOMTR-MCNC: 178 MG/DL (ref 70–130)
GLUCOSE BLDC GLUCOMTR-MCNC: 214 MG/DL (ref 70–130)
GLUCOSE BLDC GLUCOMTR-MCNC: 264 MG/DL (ref 70–130)
GLUCOSE SERPL-MCNC: 202 MG/DL (ref 65–99)
GLUCOSE SERPL-MCNC: 207 MG/DL (ref 65–99)
HCT VFR BLD AUTO: 26.4 % (ref 34–46.6)
HCT VFR BLD AUTO: 30.4 % (ref 34–46.6)
HGB BLD-MCNC: 7.9 G/DL (ref 12–15.9)
HGB BLD-MCNC: 9 G/DL (ref 12–15.9)
IMM GRANULOCYTES # BLD AUTO: 0.06 10*3/MM3 (ref 0–0.05)
IMM GRANULOCYTES NFR BLD AUTO: 0.5 % (ref 0–0.5)
IRON 24H UR-MRATE: 39 MCG/DL (ref 37–145)
IRON SATN MFR SERPL: 14 % (ref 20–50)
LYMPHOCYTES # BLD AUTO: 1.46 10*3/MM3 (ref 0.7–3.1)
LYMPHOCYTES NFR BLD AUTO: 13 % (ref 19.6–45.3)
MAGNESIUM SERPL-MCNC: 1.6 MG/DL (ref 1.6–2.4)
MCH RBC QN AUTO: 27.4 PG (ref 26.6–33)
MCH RBC QN AUTO: 27.6 PG (ref 26.6–33)
MCHC RBC AUTO-ENTMCNC: 29.6 G/DL (ref 31.5–35.7)
MCHC RBC AUTO-ENTMCNC: 29.9 G/DL (ref 31.5–35.7)
MCV RBC AUTO: 92.3 FL (ref 79–97)
MCV RBC AUTO: 92.4 FL (ref 79–97)
MONOCYTES # BLD AUTO: 0.73 10*3/MM3 (ref 0.1–0.9)
MONOCYTES NFR BLD AUTO: 6.5 % (ref 5–12)
NEUTROPHILS NFR BLD AUTO: 79.9 % (ref 42.7–76)
NEUTROPHILS NFR BLD AUTO: 8.96 10*3/MM3 (ref 1.7–7)
NRBC BLD AUTO-RTO: 0 /100 WBC (ref 0–0.2)
PHOSPHATE SERPL-MCNC: 1.7 MG/DL (ref 2.5–4.5)
PLATELET # BLD AUTO: 125 10*3/MM3 (ref 140–450)
PLATELET # BLD AUTO: 141 10*3/MM3 (ref 140–450)
PMV BLD AUTO: 11 FL (ref 6–12)
PMV BLD AUTO: 11.9 FL (ref 6–12)
POTASSIUM SERPL-SCNC: 4.7 MMOL/L (ref 3.5–5.2)
POTASSIUM SERPL-SCNC: 4.8 MMOL/L (ref 3.5–5.2)
PROT SERPL-MCNC: 5 G/DL (ref 6–8.5)
RBC # BLD AUTO: 2.86 10*6/MM3 (ref 3.77–5.28)
RBC # BLD AUTO: 3.29 10*6/MM3 (ref 3.77–5.28)
SODIUM SERPL-SCNC: 130 MMOL/L (ref 136–145)
SODIUM SERPL-SCNC: 131 MMOL/L (ref 136–145)
T3FREE SERPL-MCNC: 3.37 PG/ML (ref 2–4.4)
T4 FREE SERPL-MCNC: 1.62 NG/DL (ref 0.93–1.7)
TIBC SERPL-MCNC: 271 MCG/DL (ref 298–536)
TRANSFERRIN SERPL-MCNC: 182 MG/DL (ref 200–360)
TSH SERPL DL<=0.05 MIU/L-ACNC: 5.02 UIU/ML (ref 0.27–4.2)
VIT B12 BLD-MCNC: 376 PG/ML (ref 211–946)
WBC # BLD AUTO: 11.22 10*3/MM3 (ref 3.4–10.8)
WBC # BLD AUTO: 11.72 10*3/MM3 (ref 3.4–10.8)

## 2021-08-14 PROCEDURE — 83735 ASSAY OF MAGNESIUM: CPT | Performed by: SURGERY

## 2021-08-14 PROCEDURE — 82607 VITAMIN B-12: CPT | Performed by: PHYSICIAN ASSISTANT

## 2021-08-14 PROCEDURE — 84443 ASSAY THYROID STIM HORMONE: CPT | Performed by: INTERNAL MEDICINE

## 2021-08-14 PROCEDURE — 63710000001 INSULIN DETEMIR PER 5 UNITS: Performed by: INTERNAL MEDICINE

## 2021-08-14 PROCEDURE — 94799 UNLISTED PULMONARY SVC/PX: CPT

## 2021-08-14 PROCEDURE — 84100 ASSAY OF PHOSPHORUS: CPT | Performed by: SURGERY

## 2021-08-14 PROCEDURE — 84466 ASSAY OF TRANSFERRIN: CPT | Performed by: PHYSICIAN ASSISTANT

## 2021-08-14 PROCEDURE — 85025 COMPLETE CBC W/AUTO DIFF WBC: CPT | Performed by: PHYSICIAN ASSISTANT

## 2021-08-14 PROCEDURE — 93010 ELECTROCARDIOGRAM REPORT: CPT | Performed by: INTERNAL MEDICINE

## 2021-08-14 PROCEDURE — 99221 1ST HOSP IP/OBS SF/LOW 40: CPT | Performed by: PHYSICIAN ASSISTANT

## 2021-08-14 PROCEDURE — 93005 ELECTROCARDIOGRAM TRACING: CPT | Performed by: PHYSICIAN ASSISTANT

## 2021-08-14 PROCEDURE — 84439 ASSAY OF FREE THYROXINE: CPT | Performed by: PHYSICIAN ASSISTANT

## 2021-08-14 PROCEDURE — 25010000002 HEPARIN (PORCINE) PER 1000 UNITS: Performed by: SURGERY

## 2021-08-14 PROCEDURE — 25010000002 MAGNESIUM SULFATE IN D5W 1G/100ML (PREMIX) 1-5 GM/100ML-% SOLUTION: Performed by: SURGERY

## 2021-08-14 PROCEDURE — 82746 ASSAY OF FOLIC ACID SERUM: CPT | Performed by: PHYSICIAN ASSISTANT

## 2021-08-14 PROCEDURE — 63710000001 INSULIN ASPART PER 5 UNITS: Performed by: SURGERY

## 2021-08-14 PROCEDURE — 83540 ASSAY OF IRON: CPT | Performed by: PHYSICIAN ASSISTANT

## 2021-08-14 PROCEDURE — 84481 FREE ASSAY (FT-3): CPT | Performed by: PHYSICIAN ASSISTANT

## 2021-08-14 PROCEDURE — 99024 POSTOP FOLLOW-UP VISIT: CPT | Performed by: SURGERY

## 2021-08-14 PROCEDURE — 25010000002 MORPHINE PER 10 MG: Performed by: PHYSICIAN ASSISTANT

## 2021-08-14 PROCEDURE — 82962 GLUCOSE BLOOD TEST: CPT

## 2021-08-14 PROCEDURE — 85027 COMPLETE CBC AUTOMATED: CPT | Performed by: SURGERY

## 2021-08-14 PROCEDURE — 80053 COMPREHEN METABOLIC PANEL: CPT | Performed by: SURGERY

## 2021-08-14 PROCEDURE — 82728 ASSAY OF FERRITIN: CPT | Performed by: PHYSICIAN ASSISTANT

## 2021-08-14 RX ORDER — PANTOPRAZOLE SODIUM 40 MG/1
40 TABLET, DELAYED RELEASE ORAL
Status: DISCONTINUED | OUTPATIENT
Start: 2021-08-14 | End: 2021-08-18 | Stop reason: HOSPADM

## 2021-08-14 RX ORDER — PROCHLORPERAZINE EDISYLATE 5 MG/ML
5 INJECTION INTRAMUSCULAR; INTRAVENOUS EVERY 6 HOURS PRN
Status: DISCONTINUED | OUTPATIENT
Start: 2021-08-14 | End: 2021-08-18 | Stop reason: HOSPADM

## 2021-08-14 RX ORDER — CHOLECALCIFEROL (VITAMIN D3) 125 MCG
10 CAPSULE ORAL NIGHTLY PRN
Status: DISCONTINUED | OUTPATIENT
Start: 2021-08-14 | End: 2021-08-18 | Stop reason: HOSPADM

## 2021-08-14 RX ORDER — MAGNESIUM SULFATE HEPTAHYDRATE 40 MG/ML
2 INJECTION, SOLUTION INTRAVENOUS AS NEEDED
Status: DISCONTINUED | OUTPATIENT
Start: 2021-08-14 | End: 2021-08-18 | Stop reason: HOSPADM

## 2021-08-14 RX ORDER — ACETAMINOPHEN 325 MG/1
650 TABLET ORAL EVERY 6 HOURS PRN
Status: DISCONTINUED | OUTPATIENT
Start: 2021-08-14 | End: 2021-08-18 | Stop reason: HOSPADM

## 2021-08-14 RX ORDER — MAGNESIUM SULFATE 1 G/100ML
1 INJECTION INTRAVENOUS ONCE
Status: COMPLETED | OUTPATIENT
Start: 2021-08-14 | End: 2021-08-14

## 2021-08-14 RX ORDER — HYDROXYZINE HYDROCHLORIDE 25 MG/1
25 TABLET, FILM COATED ORAL 3 TIMES DAILY PRN
Status: DISCONTINUED | OUTPATIENT
Start: 2021-08-14 | End: 2021-08-18 | Stop reason: HOSPADM

## 2021-08-14 RX ORDER — MAGNESIUM SULFATE 1 G/100ML
1 INJECTION INTRAVENOUS AS NEEDED
Status: DISCONTINUED | OUTPATIENT
Start: 2021-08-14 | End: 2021-08-18 | Stop reason: HOSPADM

## 2021-08-14 RX ADMIN — POTASSIUM & SODIUM PHOSPHATES POWDER PACK 280-160-250 MG 2 PACKET: 280-160-250 PACK at 04:26

## 2021-08-14 RX ADMIN — OXYBUTYNIN CHLORIDE 10 MG: 5 TABLET, EXTENDED RELEASE ORAL at 08:20

## 2021-08-14 RX ADMIN — INSULIN ASPART 8 UNITS: 100 INJECTION, SOLUTION INTRAVENOUS; SUBCUTANEOUS at 10:45

## 2021-08-14 RX ADMIN — INSULIN ASPART 3 UNITS: 100 INJECTION, SOLUTION INTRAVENOUS; SUBCUTANEOUS at 17:32

## 2021-08-14 RX ADMIN — HYDROCODONE BITARTRATE AND ACETAMINOPHEN 1 TABLET: 5; 325 TABLET ORAL at 00:47

## 2021-08-14 RX ADMIN — SODIUM CHLORIDE 100 ML/HR: 9 INJECTION, SOLUTION INTRAVENOUS at 04:25

## 2021-08-14 RX ADMIN — HEPARIN SODIUM 5000 UNITS: 5000 INJECTION INTRAVENOUS; SUBCUTANEOUS at 14:23

## 2021-08-14 RX ADMIN — PANTOPRAZOLE SODIUM 40 MG: 40 TABLET, DELAYED RELEASE ORAL at 08:21

## 2021-08-14 RX ADMIN — INSULIN DETEMIR 15 UNITS: 100 INJECTION, SOLUTION SUBCUTANEOUS at 20:48

## 2021-08-14 RX ADMIN — ASPIRIN 81 MG: 81 TABLET, CHEWABLE ORAL at 08:22

## 2021-08-14 RX ADMIN — ATORVASTATIN CALCIUM 10 MG: 10 TABLET, FILM COATED ORAL at 20:07

## 2021-08-14 RX ADMIN — CARVEDILOL 12.5 MG: 6.25 TABLET, FILM COATED ORAL at 17:33

## 2021-08-14 RX ADMIN — INSULIN ASPART 5 UNITS: 100 INJECTION, SOLUTION INTRAVENOUS; SUBCUTANEOUS at 08:16

## 2021-08-14 RX ADMIN — HEPARIN SODIUM 5000 UNITS: 5000 INJECTION INTRAVENOUS; SUBCUTANEOUS at 05:49

## 2021-08-14 RX ADMIN — SODIUM CHLORIDE 100 ML/HR: 9 INJECTION, SOLUTION INTRAVENOUS at 17:32

## 2021-08-14 RX ADMIN — HEPARIN SODIUM 5000 UNITS: 5000 INJECTION INTRAVENOUS; SUBCUTANEOUS at 20:07

## 2021-08-14 RX ADMIN — LOSARTAN POTASSIUM 50 MG: 50 TABLET, FILM COATED ORAL at 08:20

## 2021-08-14 RX ADMIN — MAGNESIUM SULFATE HEPTAHYDRATE 1 G: 1 INJECTION, SOLUTION INTRAVENOUS at 08:17

## 2021-08-14 RX ADMIN — MORPHINE SULFATE 2 MG: 2 INJECTION, SOLUTION INTRAMUSCULAR; INTRAVENOUS at 05:13

## 2021-08-14 NOTE — PROGRESS NOTES
[facility] HOSPITALIST PROGRESS NOTE     Patient Identification:  Name:  Rachana Justin  Age:  81 y.o.  Sex:  female  :  1940  MRN:  [id number]  Visit Number:  [id number]  ROOM: [address] [place] [address]     Primary Care Provider:  Shabnam De La Rosa MD    Length of stay in inpatient status:  2    Subjective     Chief Compliant:  No chief complaint on file.      History of Presenting Illness:    Patient not passing gas or stool at this time. She reports abdomen slightly sore but she is otherwise doing well. Blood glucoses have been improved.     ROS:  Otherwise 10 point ROS negative other than documented above in HPI.     Objective     Current Hospital Meds:amLODIPine, 5 mg, Oral, Q24H  aspirin, 81 mg, Oral, Daily  atorvastatin, 10 mg, Oral, Nightly  carvedilol, 12.5 mg, Oral, BID With Meals  heparin (porcine), 5,000 Units, Subcutaneous, Q8H  insulin aspart, 0-14 Units, Subcutaneous, TID AC  insulin detemir, 15 Units, Subcutaneous, Nightly  losartan, 50 mg, Oral, Daily  oxybutynin XL, 10 mg, Oral, Daily  pantoprazole, 40 mg, Oral, Q AM    sodium chloride, 100 mL/hr, Last Rate: 100 mL/hr (21 0425)        Current Antimicrobial Therapy:  Anti-Infectives (From admission, onward)    Ordered     Dose/Rate Route Frequency Start Stop    21 0739  ampicillin-sulbactam (UNASYN) 3 g in sodium chloride 0.9 % 100 mL IVPB-VTB     Ordering Provider: Troy Elkins MD    3 g Intravenous Once 21 0741 21 0858        Current Diuretic Therapy:  Diuretics (From admission, onward)    None        ----------------------------------------------------------------------------------------------------------------------  Vital Signs:  Temp:  [97.5 °F (36.4 °C)-98.5 °F (36.9 °C)] 98.2 °F (36.8 °C)  Heart Rate:  [70-71] 70  Resp:  [18] 18  BP: (105-151)/(51-65) 105/51  SpO2:  [95 %-96 %] 95 %  on   ;   Device (Oxygen Therapy): room air  Body mass index is 28.98 kg/m².    Wt Readings from Last 3 Encounters:    08/12/21 83.9 kg (185 lb)   07/27/21 83.9 kg (185 lb)   07/14/21 83.9 kg (185 lb)     Intake & Output (last 3 days)       08/11 0701 - 08/12 0700 08/12 0701 - 08/13 0700 08/13 0701 - 08/14 0700 08/14 0701 - 08/15 0700    P.O.  960 280 720    I.V. (mL/kg)  1462 (17.4)      Total Intake(mL/kg)  2422 (28.9) 280 (3.3) 720 (8.6)    Urine (mL/kg/hr)  2180 3900 (1.9)     Emesis/NG output   0     Stool   0     Total Output  2180 3900     Net  +242 -3620 +720            Urine Unmeasured Occurrence   4 x 1 x    Stool Unmeasured Occurrence   0 x     Emesis Unmeasured Occurrence   1 x         Diet Full Liquid  ----------------------------------------------------------------------------------------------------------------------  Physical exam:  Constitutional:  Well-developed and well-nourished.  No respiratory distress.      HENT:  Head:  Normocephalic and atraumatic.  Mouth:  Moist mucous membranes.    Eyes:  Conjunctivae and EOM are normal. No scleral icterus.    Neck:  Neck supple.  No JVD present.    Cardiovascular:  Normal rate, regular rhythm and normal heart sounds with no murmur.  Pulmonary/Chest:  No respiratory distress, no wheezes, no crackles, with normal breath sounds and good air movement.  Abdominal:  Soft.  Slightly distended. Nontender.   Musculoskeletal:  No edema, no tenderness, and no deformity.  No red or swollen joints anywhere.    Neurological:  Alert and oriented to person, place, and time.  No cranial nerve deficit.  No tongue deviation.  No facial droop.  No slurred speech.   Skin:  Skin is warm and dry. No rash noted. No pallor.   Peripheral vascular:  Pulses in all 4 extremities with no clubbing, no cyanosis, no edema.  ----------------------------------------------------------------------------------------------------------------------  Tele:    ----------------------------------------------------------------------------------------------------------------------  Results from last 7 days   Lab  Units 08/14/21  0617 08/14/21 0212 08/13/21 0449   WBC 10*3/mm3 11.22* 11.72* 11.69*   HEMOGLOBIN g/dL 7.9* 9.0* 11.0*   HEMATOCRIT % 26.4* 30.4* 36.0   MCV fL 92.3 92.4 91.4   MCHC g/dL 29.9* 29.6* 30.6*   PLATELETS 10*3/mm3 141 125* 137*         Results from last 7 days   Lab Units 08/14/21  0617 08/14/21 0212 08/13/21  1925 08/13/21  1610 08/13/21  1610   SODIUM mmol/L 131* 130*  --   --  129*   POTASSIUM mmol/L 4.7 4.8 5.1   < > 5.5*   MAGNESIUM mg/dL  --  1.6  --   --   --    CHLORIDE mmol/L 102 99  --   --  100   CO2 mmol/L 23.4 21.9*  --   --  21.6*   BUN mg/dL 38* 37*  --   --  35*   CREATININE mg/dL 0.86 0.90  --   --  1.04*   EGFR IF NONAFRICN AM mL/min/1.73 63 60*  --   --  51*   CALCIUM mg/dL 9.2 9.6  --   --  9.5   PHOSPHORUS mg/dL  --  1.7*  --   --   --    GLUCOSE mg/dL 207* 202*  --   --  361*   ALBUMIN g/dL 2.96*  --   --   --   --    BILIRUBIN mg/dL 0.6  --   --   --   --    ALK PHOS U/L 98  --   --   --   --    AST (SGOT) U/L 11  --   --   --   --    ALT (SGPT) U/L 7  --   --   --   --     < > = values in this interval not displayed.   Estimated Creatinine Clearance: 55.2 mL/min (by C-G formula based on SCr of 0.86 mg/dL).  No results found for: AMMONIA              Hemoglobin A1C   Date/Time Value Ref Range Status   08/13/2021 0449 8.20 (H) 4.80 - 5.60 % Final     Glucose   Date/Time Value Ref Range Status   08/14/2021 1032 264 (H) 70 - 130 mg/dL Final     Comment:     Meter: [id number] : [id number] Flex Shipman   08/14/2021 0654 214 (H) 70 - 130 mg/dL Final     Comment:     Meter: [id number] : [id number] maribel coello   08/13/2021 2050 232 (H) 70 - 130 mg/dL Final     Comment:     Meter: [id number] : [id number] JOHN SMITH   08/13/2021 1929 289 (H) 70 - 130 mg/dL Final     Comment:     Meter: [id number] : [id number] JOHN SMITH   08/13/2021 1607 348 (H) 70 - 130 mg/dL Final     Comment:     Meter: [id number] : [id number] Flex Shipman   08/13/2021 1153 416 (C)  70 - 130 mg/dL Final     Comment:     Result Not Confirmed Meter: [id number] : [id number] anne lang   08/13/2021 1043 467 (C) 70 - 130 mg/dL Final     Comment:     RN Notified Meter: [id number] : [id number] anne lang   08/12/2021 2240 341 (H) 70 - 130 mg/dL Final     Comment:     Meter: [id number] : [id number] KATHERYN CORNELL     Lab Results   Component Value Date    TSH 5.020 (H) 08/14/2021    FREET4 1.62 08/14/2021     No results found for: PREGTESTUR, PREGSERUM, HCG, HCGQUANT  Pain Management Panel     Pain Management Panel Latest Ref Rng & Units 5/29/2021    AMPHETAMINES SCREEN, URINE Negative Negative    BARBITURATES SCREEN Negative Negative    BENZODIAZEPINE SCREEN, URINE Negative Negative    BUPRENORPHINEUR Negative Negative    COCAINE SCREEN, URINE Negative Negative    METHADONE SCREEN, URINE Negative Negative        Brief Urine Lab Results  (Last result in the past 365 days)      Color   Clarity   Blood   Leuk Est   Nitrite   Protein   CREAT   Urine HCG        05/29/21 0406 Yellow Clear Negative Small (1+) Negative Trace             No results found for: BLOODCX  No results found for: URINECX  No results found for: WOUNDCX  No results found for: STOOLCX  No results found for: RESPCX  No results found for: AFBCX        I have personally looked at the labs and they are summarized above.  ----------------------------------------------------------------------------------------------------------------------  Detailed radiology reports for the last 24 hours:    Imaging Results (Last 24 Hours)     ** No results found for the last 24 hours. **        Assessment & Plan    #Mesenteric lymphadenopathy s/p resection of proximal ileum and enlarged lymph nodes   - Managed by primary team. Biopsy pending.     #DM II  - Better controlled today with moderate-high dose SSI  - Patient takes 50 units of basal insulin at home, will restart at 15 as she is on CLD and currently in 200's BG.     #Electrolyte  abnormalities including hypomagnesemia, hypophosphatemia, hyperkalemia (resolved)  - Replaced per protocol. Potassium wnl. Repeat in AM.     #CKD  - Cr at baseline     #Chronic afib  - Rate controlled.   - Currently on SubQ heparin for prophylaxis. Timing on when to restart eliquis per surgery. Currently patient with worsening anemia.     #Postoperative anemia  - Hemoglobin trended down from 11 preoperatively to 7.9.   - Iron studies consistent with combination of AOCD and PAULINE.   - No active signs of bleeding. Will continue to monitor.     #Chronic diastolic heart failure   #Moderate MVR  #Moderate to severe TVR  #HTN  #HLD  - Continue amlodipine, coreg, atorvastatin, losartan.     Hx of COVID 19 infection in 6/21. Now PCR negative.     Currently getting 100 cc/hr. As PO intake improves would consider stopping to prevent volume overload.     Thank you for allowing us to take part in the care of the patient. Please call with any questions.       VTE Prophylaxis:   Mechanical Order History:     None      Pharmalogical Order History:      Ordered     Dose Route Frequency Stop    08/12/21 1223  heparin (porcine) 5000 UNIT/ML injection 5,000 Units      5,000 Units SC Every 8 Hours Scheduled --                  Jamaal Franco MD  [facility]  08/14/21  16:48 EDT

## 2021-08-14 NOTE — DISCHARGE INSTRUCTIONS
You have been enrolled into the transition from hospital to home program.  A nurse (Deshaun) will be contacting you after you discharge to home to set up a time to come out to your home for a follow-up visit.  If you have any questions or concerns you can call this number anytime and a nurse will contact you:  [facility]  [phone].

## 2021-08-14 NOTE — PLAN OF CARE
Goal Outcome Evaluation:  Plan of Care Reviewed With: patient        Progress: no change  Outcome Summary: Pt has rested in bed throughout shift. Rea removed per order. PRN pain med given per MAR.

## 2021-08-14 NOTE — PLAN OF CARE
Goal Outcome Evaluation:  Plan of Care Reviewed With: patient           Outcome Summary: Patient has done well this shift she has rested throughout the day. She also ambulated in the hallway, sat up on the side of the bed and sat up in the chair today as well.

## 2021-08-14 NOTE — H&P
"                           [facility] Medicine Services  CONSULT NOTE     Consults    Patient Identification:  Name:  Rachana Justin  Age:  81 y.o.  Sex:  female  :  1940  MRN:  [id number]  Visit Number:  [id number]  Primary care provider:  Shabnam De La Rosa MD    Length of stay:  2    Subjective     Chief Complaint:  Abdominal pain    History of presenting illness:     Rachana Justin is a 81 y.o. female admitted by general surgery, Dr. Elkins.  Hospitalist services were consulted for uncontrolled type 2 diabetes mellitus, hyponatremia, and hyperkalemia.     The patient was admitted by general surgery, Dr. Elkins, on 2021 to undergo a diagnostic laparoscopy.  Large mesenteric lymph nodes were found at the root of the mesentery of a 56 cm segment of the proximal ileum that was then resected including the massively enlarged nodes.  There were multiple reactive nodes and mildly enlarged nodes that were palpated in the root of the mesentery but were unable to be resected due to fear compromise of the small bowel blood supply.  The patient tolerated the procedure well and had minimal blood loss.    The patient reports that since her procedure, she has been experiencing diffuse abdominal pain that she describes as a \"soreness.\" She states she has no appetite and has been unable to drink very much as it makes her feel nauseous. She has experienced several episodes of emesis but denies any hematemesis. She has not had a bowel movement since her procedure. Her narayanan catheter was removed at midnight and she has had 3900 of urine output. She denies any chills, diaphoresis, shortness of breath, coughing, wheezing, chest pain, dysuria, dizziness, or lightheadedness. She states her glucose runs in the 200s at home.     Present during exam: BRANDT Brothers   ---------------------------------------------------------------------------------------------------------------------  Review of Systems "   Constitutional: Positive for appetite change (Decrease). Negative for chills, diaphoresis, fatigue and fever.   HENT: Negative for congestion, sinus pain and sore throat.    Eyes: Negative for photophobia and visual disturbance.   Respiratory: Negative for cough, chest tightness, shortness of breath and wheezing.    Cardiovascular: Negative for chest pain and palpitations.   Gastrointestinal: Positive for abdominal pain (Diffuse soreness), nausea and vomiting. Negative for constipation and diarrhea.   Genitourinary: Negative for dysuria, frequency and urgency.   Musculoskeletal: Negative for arthralgias, gait problem and myalgias.   Skin: Negative for rash and wound.   Neurological: Negative for dizziness, syncope, weakness, light-headedness and headaches.   Psychiatric/Behavioral: Negative for confusion and decreased concentration. The patient is not nervous/anxious.         Otherwise 10-system ROS reviewed and is negative except as mentioned in the HPI.  ---------------------------------------------------------------------------------------------------------------------   Past History:  Past Medical History:   Diagnosis Date   • Ambulates with cane    • Arthritis    • Atrial fibrillation (CMS/HCC)    • COVID-19 virus infection 7/24/2020   • Diabetes mellitus (CMS/HCC)    • Gallstones    • GERD (gastroesophageal reflux disease)    • Hyperlipidemia    • Hypertension    • Mesenteric lymphadenopathy 6/23/2021    Added automatically from request for surgery [id number]   • Pulmonary hypertension (CMS/HCC)    • Status post laparoscopic cholecystectomy 3/9/2021     Past Surgical History:   Procedure Laterality Date   • CATARACT EXTRACTION Bilateral    • CHOLECYSTECTOMY N/A 3/26/2021    Procedure: CHOLECYSTECTOMY LAPAROSCOPIC (REQUESTS MARIVEL NEWMAN);  Surgeon: Keagan Lopez MD;  Location: [facility];  Service: General;  Laterality: N/A;   • COLONOSCOPY N/A 7/14/2021    Procedure: COLONOSCOPY;  Surgeon: Troy Elkins  MD Yoshi;  Location: [facility] OR;  Service: Gastroenterology;  Laterality: N/A;   • ENDOSCOPY N/A 2021    Procedure: ESOPHAGOGASTRODUODENOSCOPY WITH ANESTHESIA;  Surgeon: Troy Elkins MD;  Location: [facility] OR;  Service: Gastroenterology;  Laterality: N/A;   • FRACTURE SURGERY      right foot orif   • PACEMAKER IMPLANTATION Left 2018     Family History   Problem Relation Age of Onset   • Breast cancer Sister    • Breast cancer Sister    • Breast cancer Sister         40's     Social History     Socioeconomic History   • Marital status:      Spouse name: Not on file   • Number of children: Not on file   • Years of education: Not on file   • Highest education level: Not on file   Tobacco Use   • Smoking status: Former Smoker     Packs/day: 0.50     Years: 10.00     Pack years: 5.00     Types: Cigarettes     Quit date: 3/1/1963     Years since quittin.4   • Smokeless tobacco: Never Used   Vaping Use   • Vaping Use: Never used   Substance and Sexual Activity   • Alcohol use: No   • Drug use: Never   • Sexual activity: Defer     ---------------------------------------------------------------------------------------------------------------------   Allergies:  Patient has no known allergies.  ---------------------------------------------------------------------------------------------------------------------   Prior to Admission Medications     Prescriptions Last Dose Informant Patient Reported? Taking?    fesoterodine fumarate (TOVIAZ ER) 8 MG tablet sustained-release 24 hour tablet 2021 Pharmacy Yes Yes    Take 8 mg by mouth Daily.    losartan (COZAAR) 50 MG tablet 2021 Pharmacy Yes Yes    Take 50 mg by mouth Daily.    omeprazole (priLOSEC) 40 MG capsule Past Month Pharmacy Yes Yes    Take 40 mg by mouth Daily As Needed (stomach).    amLODIPine (NORVASC) 5 MG tablet 2021 Pharmacy No No    Take 1 tablet by mouth Daily.    apixaban (ELIQUIS) 5 MG tablet tablet 2021 Pharmacy  Yes No    Take 5 mg by mouth 2 (Two) Times a Day. ON HOLD PER DR CEJA    aspirin 81 MG chewable tablet 8/12/2021 Self Yes No    Chew 81 mg Daily.    atorvastatin (LIPITOR) 10 MG tablet 8/11/2021 Pharmacy No No    Take 1 tablet by mouth Every Night.    carvedilol (COREG) 12.5 MG tablet 8/12/2021 Pharmacy Yes No    Take 12.5 mg by mouth 2 (Two) Times a Day With Meals.    furosemide (LASIX) 40 MG tablet 8/9/2021 Pharmacy Yes No    Take 40 mg by mouth 3 (Three) Times a Week if Needed (FLUID RETENTION).    Insulin Glargine, 1 Unit Dial, (TOUJEO) 300 UNIT/ML solution pen-injector injection 8/11/2021 Pharmacy Yes No    Inject 50 Units under the skin into the appropriate area as directed Daily.    potassium chloride ER (K-TAB) 20 MEQ tablet controlled-release ER tablet 8/9/2021 Pharmacy Yes No    Take 20 mEq by mouth 3 (Three) Times a Week. Prior to [facility] Admission, Patient was on: only takes when she takes Furosemide    SITagliptin (JANUVIA) 100 MG tablet 8/11/2021 Pharmacy Yes No    Take 100 mg by mouth Daily.        ---------------------------------------------------------------------------------------------------------------------     Objective      Procedures:  Diagnostic laparoscopy converted to open with small bowel resection on 8/12/2021    [facility] Meds:  amLODIPine, 5 mg, Oral, Q24H  aspirin, 81 mg, Oral, Daily  atorvastatin, 10 mg, Oral, Nightly  carvedilol, 12.5 mg, Oral, BID With Meals  heparin (porcine), 5,000 Units, Subcutaneous, Q8H  insulin aspart, 0-14 Units, Subcutaneous, TID AC  losartan, 50 mg, Oral, Daily  oxybutynin XL, 10 mg, Oral, Daily  potassium & sodium phosphates, 2 packet, Oral, Once      sodium chloride, 100 mL/hr, Last Rate: 100 mL/hr (08/13/21 3188)      ---------------------------------------------------------------------------------------------------------------------   Vital Signs:  Temp:  [97.5 °F (36.4 °C)-98.5 °F (36.9 °C)] 98.5 °F (36.9 °C)  Heart Rate:  [70] 70  Resp:  [16-18]  18  BP: (118-151)/(59-76) 151/65  No data found.  SpO2 Percentage    08/13/21 1900 08/13/21 2122 08/14/21 0300   SpO2: 96% 96% 95%     SpO2:  [95 %-96 %] 95 %  on   ;   Device (Oxygen Therapy): room air    Body mass index is 28.98 kg/m².  Wt Readings from Last 3 Encounters:   08/12/21 83.9 kg (185 lb)   07/27/21 83.9 kg (185 lb)   07/14/21 83.9 kg (185 lb)        Intake/Output Summary (Last 24 hours) at 8/14/2021 0358  Last data filed at 8/13/2021 2300  Gross per 24 hour   Intake 1742 ml   Output 3900 ml   Net -2158 ml     Diet Full Liquid; Consistent Carbohydrate  ---------------------------------------------------------------------------------------------------------------------   Physical exam:  Physical Exam  Constitutional:       General: She is awake.      Appearance: Normal appearance. She is well-developed and overweight.   HENT:      Head: Normocephalic and atraumatic.   Eyes:      General: Lids are normal.         Right eye: No discharge.         Left eye: No discharge.   Cardiovascular:      Rate and Rhythm: Normal rate and regular rhythm.      Pulses: Normal pulses. No decreased pulses.           Dorsalis pedis pulses are 2+ on the right side and 2+ on the left side.        Posterior tibial pulses are 2+ on the right side and 2+ on the left side.      Heart sounds: Normal heart sounds. No murmur heard.   No friction rub. No gallop.    Pulmonary:      Effort: Pulmonary effort is normal. No tachypnea or bradypnea.      Breath sounds: Normal breath sounds. No decreased breath sounds, wheezing, rhonchi or rales.   Abdominal:      General: Bowel sounds are normal. There is distension.      Palpations: Abdomen is soft.      Tenderness: There is generalized abdominal tenderness.   Musculoskeletal:      Right lower leg: No edema.      Left lower leg: No edema.   Skin:     Findings: No abrasion, ecchymosis or erythema.          Neurological:      General: No focal deficit present.      Mental Status: She is alert  and oriented to person, place, and time. Mental status is at baseline.   Psychiatric:         Speech: Speech normal.         Behavior: Behavior is cooperative.         Cognition and Memory: Cognition normal.       ---------------------------------------------------------------------------------------------------------------------   EKG:   EKG from 8/13/2021 reviewed.  Pending cardiology read.  Per my evaluation, ventricular paced rhythm, left axis deviation, heart rate 70, QTc 483 MS.    Telemetry:  Patient is not currently on telemetry    I have personally reviewed the EKG  ---------------------------------------------------------------------------------------------------------------------             Results from last 7 days   Lab Units 08/14/21 0212 08/13/21 0449   WBC 10*3/mm3 11.72* 11.69*   HEMOGLOBIN g/dL 9.0* 11.0*   HEMATOCRIT % 30.4* 36.0   MCV fL 92.4 91.4   MCHC g/dL 29.6* 30.6*   PLATELETS 10*3/mm3 125* 137*     Results from last 7 days   Lab Units 08/14/21 0212 08/13/21  1925 08/13/21  1610 08/13/21 0449 08/13/21 0449   SODIUM mmol/L 130*  --  129*  --  130*   POTASSIUM mmol/L 4.8 5.1 5.5*   < > 5.5*   MAGNESIUM mg/dL 1.6  --   --   --   --    CHLORIDE mmol/L 99  --  100  --  101   CO2 mmol/L 21.9*  --  21.6*  --  19.2*   BUN mg/dL 37*  --  35*  --  28*   CREATININE mg/dL 0.90  --  1.04*  --  1.05*   EGFR IF NONAFRICN AM mL/min/1.73 60*  --  51*  --  50*   CALCIUM mg/dL 9.6  --  9.5  --  9.4   GLUCOSE mg/dL 202*  --  361*  --  381*    < > = values in this interval not displayed.   Estimated Creatinine Clearance: 52.7 mL/min (by C-G formula based on SCr of 0.9 mg/dL).  No results found for: AMMONIA    Hemoglobin A1C   Date/Time Value Ref Range Status   08/13/2021 0449 8.20 (H) 4.80 - 5.60 % Final     Glucose   Date/Time Value Ref Range Status   08/13/2021 2050 232 (H) 70 - 130 mg/dL Final     Comment:     Meter: [id number] : [id number] JOHN SMITH   08/13/2021 1929 289 (H) 70 - 130 mg/dL Final      Comment:     Meter: [id number] : [id number] JOHN SMITH   08/13/2021 1607 348 (H) 70 - 130 mg/dL Final     Comment:     Meter: [id number] : [id number] Flex Shipman   08/13/2021 1153 416 (C) 70 - 130 mg/dL Final     Comment:     Result Not Confirmed Meter: [id number] : [id number] anne lang   08/13/2021 1043 467 (C) 70 - 130 mg/dL Final     Comment:     RN Notified Meter: [id number] : [id number] anne lang   08/12/2021 2240 341 (H) 70 - 130 mg/dL Final     Comment:     Meter: [id number] : [id number] KATHERYN CORNELL   08/12/2021 1154 221 (H) 70 - 130 mg/dL Final     Comment:     Meter: [id number] : [id number] Mele Nu   08/12/2021 0810 162 (H) 70 - 130 mg/dL Final     Comment:     Meter: [id number] : [id number] Vijay CARLIN     Lab Results   Component Value Date    HGBA1C 8.20 (H) 08/13/2021     Lab Results   Component Value Date    TSH 5.020 (H) 08/14/2021     Pain Management Panel     Pain Management Panel Latest Ref Rng & Units 5/29/2021    AMPHETAMINES SCREEN, URINE Negative Negative    BARBITURATES SCREEN Negative Negative    BENZODIAZEPINE SCREEN, URINE Negative Negative    BUPRENORPHINEUR Negative Negative    COCAINE SCREEN, URINE Negative Negative    METHADONE SCREEN, URINE Negative Negative        I have personally reviewed the above laboratory results.   ---------------------------------------------------------------------------------------------------------------------  Imaging Results (Last 7 Days)     Procedure Component Value Units Date/Time    FL redd endo (surgery) [[id number]] Resulted: 08/12/21 1000     Updated: 08/12/21 1000    Narrative:      This procedure was auto-finalized with no dictation required.        I have personally reviewed the above radiology results.   ----------------------------------------------------------------------------------------------------------------------    Assessment/Plan       #Mesenteric lymphadenopathy status post  resection of proximal ileum and massively enlarged nodes  -Continue treatment per primary  -Clear liquid, consistent carb diet  -P.o. Norco 5 mg and and IV morphine 2 mg as needed for pain; we will add holding parameters to prevent oversedation, respiratory depression, and/or hypotension  -We will switch IV Zofran to IV Compazine in setting of prolonged QTC    #Insulin-dependent type 2 diabetes mellitus  -Hemoglobin A1c 8.20  -Patient has already received subcutaneous regular insulin 10 units and subcutaneous NovoLog 8 units  -Glucose on a.m. labs 202  -We will obtain Accu-Cheks 4 times daily before meals and at bedtime  -Since patient is on a clear liquid, consistent carb diet and has not been eating much, will monitor closely  -Hypoglycemia protocol in place  -Hold home oral hypoglycemics to prevent hypoglycemia  -Corrected sodium for hyperglycemia is 132  -Continue with maintenance NS at 100 mL/h  -Random urine sodium ordered    #Hyperkalemia, now resolved  #Hypomagnesemia  #Hypophosphatemia  #Prolonged QTC  -Potassium was initially 5.5 on 8/13/2021; potassium now 4.8  -Improved after receiving IV calcium gluconate 1 g, IV sodium bicarb 50 mEq, and subcutaneous regular insulin 10 units and subcutaneous Novolog 8 units   -Magnesium and phosphorous replacement protocol ordered   -Repeat a.m. EKG for QTc monitoring  -Avoid QTC prolonging agents as much possible    #Elevated TSH  -TSH 5.020  -Obtain free T3-T4    #Normocytic anemia  #Thrombocytopenia  #History of iron deficiency anemia  -H&H stable; repeat a.m. CBC monitor H&H closely  -Obtain iron, folate, ferritin, iron panel, vitamin B12; replace as necessary  -If hemoglobin less than 7 or platelets less than 50,000 with active bleeding, will transfuse  -Obtain peripheral blood smear    #CKD stage II/IIIa  -Baseline creatinine appears to be around 0.9-1.0; creatinine admission 0.9  -Repeat a.m. CMP monitor renal function closely    #Chronic atrial  fibrillation  #Status post PPM  #Chronic anticoagulation with Eliquis  -Currently rate controlled  -Subcutaneous heparin for VTE prophylaxis  -Plan to transition to Eliquis once appropriate  -No overt signs of bleeding at this time; H&H stable    #Chronic diastolic CHF  #Mild AVS, trace AVR, moderate MVR, moderate to severe TVR  #Mild MAC  -Appears euvolemic on exam  -Echo from 7/2020 reviewed, EF 61 to 65%  -Monitor for signs of volume overload with daily weights, strict I's and O's    #Essential hypertension  -Continue home antihypertensive agents; will add appropriate holding parameters to prevent bradycardia/hypotension  -Continue monitor VS per hospital protocol    #Hyperlipidemia  -Continue home statin    #Moderate pulmonary hypertension    #History of COVID-19 (June 2021)  -COVID-19 negative    F/E/N: IV  mL/h.  Replace electrolytes as necessary.  Full liquid diet, consistent carb  -----------------------  Thank you for the consult and allowing us to participate in the care of your patient, Hospitalist Services will continue to follow. Please do not hesitate to call with any questions or concerns.      Sadie Yo PA-C  Hospitalist Service -- [facility]       08/14/21  03:58 EDT    Attestation: I personally discussed the patient's history of presenting illness, physical examination, assessment, and plan with my attending physician, Dr. Kecia MD who also examined the patient on day of consultation.

## 2021-08-14 NOTE — NURSING NOTE
"Transitional Care Note    Enrolled in [facility] Transitional Care Note    Enrolled in [facility] Transitional Care Services under theTCM Model to be followed for 6 weeks post discharge.  [facility] will assist with support and education at time of transition home from hospital.  Hospital  will follow throughout the stay at [facility].  Home  will visit within 48 hours of discharge and follow with home vitals and telephone contact for 6 weeks.      Patient admitted to [facility] via Emergency Department, complaints of abd pain.  Admitted for treatment of mesenteric lymphadenopathy.    Patient contacted via phone.      Explained transition to home program and Patient is agreeable to home visits.  Home  will be Deshaun Miguel RN    Clinical Assessment Instrument Scores:  >Short Portable Mental Status 10, normal mental function  >Geriatric Depression Scale 3, normal  >IADL 7, Independent with ADL's  >JANSEN-ADL 6, Independent with self-care  >Subjective Health Rating \"fair\"  >General Anxiety Scale  0    "

## 2021-08-14 NOTE — PROGRESS NOTES
"Patient Name:  Rachana Justin  YOB: 1940  [id number]    Surgery Progress Note    Date of visit: 8/14/2021    Subjective   Subjective: Postop day 2  Rea removed at midnight. Patient states she got up to void twice after  Pain is overall controlled. Feeling \"rumblings\" in her abdomen without any flatus or bowel function yet. She is belching some but no vomiting         Objective     Objective:     /51   Pulse 70   Temp 98.2 °F (36.8 °C) (Oral)   Resp 18   Ht 170.2 cm (67\")   Wt 83.9 kg (185 lb)   SpO2 95%   BMI 28.98 kg/m²     Intake/Output Summary (Last 24 hours) at 8/14/2021 0922  Last data filed at 8/13/2021 2300  Gross per 24 hour   Intake 260 ml   Output 2750 ml   Net -2490 ml       Constitution: No acute distress  CV:  Rhythm  regular and rate regular   L:  Symmetric chest expansion. No respiratory distress  Abd:   soft, nondistended, appropriately tender to palpation. Incisions are clean, dry and intact with distance mild surrounding ecchymosis  Ext:  No cyanosis, clubbing, edema    Recent labs that are back at this time have been reviewed.   White blood cell count stable at 11. Hemoglobin downtrending slightly to 7.9 from 9  Glucoses have improved to the low 200s from near 400 yesterday  Potassium is now 4.7          Assessment/Plan     Assessment/ Plan:  81-year-old diabetic female status post exploratory laparotomy and small bowel resection for mesenteric adenopathy.    Full liquids at this time, due to patient request. I cautioned her on avoiding p.o. intake if she is belching or nauseous.  Awaiting return of bowel function    Glucose control has improved with small changes in her insulin regimen as well as removing D5 from her IV fluids. Hospitalist had been consulted for assistance    Hyperkalemia improved        Arthur Marquez MD  [facility] Surgery  8/14/2021  09:22 EDT    "

## 2021-08-15 ENCOUNTER — APPOINTMENT (OUTPATIENT)
Dept: GENERAL RADIOLOGY | Facility: HOSPITAL | Age: 81
End: 2021-08-15

## 2021-08-15 LAB
ACANTHOCYTES BLD QL SMEAR: ABNORMAL
ANION GAP SERPL CALCULATED.3IONS-SCNC: 8.4 MMOL/L (ref 5–15)
ANION GAP SERPL CALCULATED.3IONS-SCNC: 8.6 MMOL/L (ref 5–15)
ANISOCYTOSIS BLD QL: ABNORMAL
BUN SERPL-MCNC: 42 MG/DL (ref 8–23)
BUN SERPL-MCNC: 46 MG/DL (ref 8–23)
BUN/CREAT SERPL: 44.2 (ref 7–25)
BUN/CREAT SERPL: 50.5 (ref 7–25)
BURR CELLS BLD QL SMEAR: ABNORMAL
CALCIUM SPEC-SCNC: 9.1 MG/DL (ref 8.6–10.5)
CALCIUM SPEC-SCNC: 9.2 MG/DL (ref 8.6–10.5)
CHLORIDE SERPL-SCNC: 103 MMOL/L (ref 98–107)
CHLORIDE SERPL-SCNC: 103 MMOL/L (ref 98–107)
CO2 SERPL-SCNC: 19.6 MMOL/L (ref 22–29)
CO2 SERPL-SCNC: 22.4 MMOL/L (ref 22–29)
CREAT SERPL-MCNC: 0.91 MG/DL (ref 0.57–1)
CREAT SERPL-MCNC: 0.95 MG/DL (ref 0.57–1)
DEPRECATED RDW RBC AUTO: 52.5 FL (ref 37–54)
DEPRECATED RDW RBC AUTO: 52.9 FL (ref 37–54)
DEPRECATED RDW RBC AUTO: 56.3 FL (ref 37–54)
EOSINOPHIL # BLD MANUAL: 0.08 10*3/MM3 (ref 0–0.4)
EOSINOPHIL # BLD MANUAL: 0.35 10*3/MM3 (ref 0–0.4)
EOSINOPHIL NFR BLD MANUAL: 1 % (ref 0.3–6.2)
EOSINOPHIL NFR BLD MANUAL: 3 % (ref 0.3–6.2)
ERYTHROCYTE [DISTWIDTH] IN BLOOD BY AUTOMATED COUNT: 15.8 % (ref 12.3–15.4)
ERYTHROCYTE [DISTWIDTH] IN BLOOD BY AUTOMATED COUNT: 15.8 % (ref 12.3–15.4)
ERYTHROCYTE [DISTWIDTH] IN BLOOD BY AUTOMATED COUNT: 16 % (ref 12.3–15.4)
GFR SERPL CREATININE-BSD FRML MDRD: 56 ML/MIN/1.73
GFR SERPL CREATININE-BSD FRML MDRD: 59 ML/MIN/1.73
GLUCOSE BLDC GLUCOMTR-MCNC: 121 MG/DL (ref 70–130)
GLUCOSE BLDC GLUCOMTR-MCNC: 190 MG/DL (ref 70–130)
GLUCOSE BLDC GLUCOMTR-MCNC: 200 MG/DL (ref 70–130)
GLUCOSE BLDC GLUCOMTR-MCNC: 217 MG/DL (ref 70–130)
GLUCOSE SERPL-MCNC: 126 MG/DL (ref 65–99)
GLUCOSE SERPL-MCNC: 188 MG/DL (ref 65–99)
HCT VFR BLD AUTO: 22.4 % (ref 34–46.6)
HCT VFR BLD AUTO: 22.7 % (ref 34–46.6)
HCT VFR BLD AUTO: 27.5 % (ref 34–46.6)
HGB BLD-MCNC: 7 G/DL (ref 12–15.9)
HGB BLD-MCNC: 7 G/DL (ref 12–15.9)
HGB BLD-MCNC: 8 G/DL (ref 12–15.9)
HYPOCHROMIA BLD QL: ABNORMAL
HYPOCHROMIA BLD QL: ABNORMAL
LYMPHOCYTES # BLD MANUAL: 1.28 10*3/MM3 (ref 0.7–3.1)
LYMPHOCYTES # BLD MANUAL: 2.35 10*3/MM3 (ref 0.7–3.1)
LYMPHOCYTES NFR BLD MANUAL: 16 % (ref 19.6–45.3)
LYMPHOCYTES NFR BLD MANUAL: 20 % (ref 19.6–45.3)
LYMPHOCYTES NFR BLD MANUAL: 5 % (ref 5–12)
LYMPHOCYTES NFR BLD MANUAL: 9 % (ref 5–12)
MACROCYTES BLD QL SMEAR: ABNORMAL
MAGNESIUM SERPL-MCNC: 1.7 MG/DL (ref 1.6–2.4)
MAGNESIUM SERPL-MCNC: 1.8 MG/DL (ref 1.6–2.4)
MCH RBC QN AUTO: 27.6 PG (ref 26.6–33)
MCH RBC QN AUTO: 28.3 PG (ref 26.6–33)
MCH RBC QN AUTO: 28.6 PG (ref 26.6–33)
MCHC RBC AUTO-ENTMCNC: 29.1 G/DL (ref 31.5–35.7)
MCHC RBC AUTO-ENTMCNC: 30.8 G/DL (ref 31.5–35.7)
MCHC RBC AUTO-ENTMCNC: 31.3 G/DL (ref 31.5–35.7)
MCV RBC AUTO: 89.4 FL (ref 79–97)
MCV RBC AUTO: 91.4 FL (ref 79–97)
MCV RBC AUTO: 97.2 FL (ref 79–97)
MONOCYTES # BLD AUTO: 0.4 10*3/MM3 (ref 0.1–0.9)
MONOCYTES # BLD AUTO: 1.06 10*3/MM3 (ref 0.1–0.9)
NEUTROPHILS # BLD AUTO: 6.23 10*3/MM3 (ref 1.7–7)
NEUTROPHILS # BLD AUTO: 7.99 10*3/MM3 (ref 1.7–7)
NEUTROPHILS NFR BLD MANUAL: 64 % (ref 42.7–76)
NEUTROPHILS NFR BLD MANUAL: 78 % (ref 42.7–76)
NEUTS BAND NFR BLD MANUAL: 4 % (ref 0–5)
PHOSPHATE SERPL-MCNC: 1.9 MG/DL (ref 2.5–4.5)
PHOSPHATE SERPL-MCNC: 2.3 MG/DL (ref 2.5–4.5)
PLAT MORPH BLD: NORMAL
PLAT MORPH BLD: NORMAL
PLATELET # BLD AUTO: 123 10*3/MM3 (ref 140–450)
PLATELET # BLD AUTO: 150 10*3/MM3 (ref 140–450)
PLATELET # BLD AUTO: 154 10*3/MM3 (ref 140–450)
PMV BLD AUTO: 10.2 FL (ref 6–12)
PMV BLD AUTO: 10.9 FL (ref 6–12)
PMV BLD AUTO: 12.3 FL (ref 6–12)
POTASSIUM SERPL-SCNC: 4 MMOL/L (ref 3.5–5.2)
POTASSIUM SERPL-SCNC: 4.7 MMOL/L (ref 3.5–5.2)
QT INTERVAL: 448 MS
QT INTERVAL: 456 MS
QTC INTERVAL: 483 MS
QTC INTERVAL: 492 MS
RBC # BLD AUTO: 2.45 10*6/MM3 (ref 3.77–5.28)
RBC # BLD AUTO: 2.54 10*6/MM3 (ref 3.77–5.28)
RBC # BLD AUTO: 2.83 10*6/MM3 (ref 3.77–5.28)
SCAN SLIDE: NORMAL
SODIUM SERPL-SCNC: 131 MMOL/L (ref 136–145)
SODIUM SERPL-SCNC: 134 MMOL/L (ref 136–145)
WBC # BLD AUTO: 11.75 10*3/MM3 (ref 3.4–10.8)
WBC # BLD AUTO: 7.99 10*3/MM3 (ref 3.4–10.8)
WBC # BLD AUTO: 8.49 10*3/MM3 (ref 3.4–10.8)

## 2021-08-15 PROCEDURE — 25010000002 PROCHLORPERAZINE 10 MG/2ML SOLUTION: Performed by: PHYSICIAN ASSISTANT

## 2021-08-15 PROCEDURE — 25010000002 HEPARIN (PORCINE) PER 1000 UNITS: Performed by: SURGERY

## 2021-08-15 PROCEDURE — 80048 BASIC METABOLIC PNL TOTAL CA: CPT | Performed by: INTERNAL MEDICINE

## 2021-08-15 PROCEDURE — 99024 POSTOP FOLLOW-UP VISIT: CPT | Performed by: SURGERY

## 2021-08-15 PROCEDURE — 25010000002 MAGNESIUM SULFATE IN D5W 1G/100ML (PREMIX) 1-5 GM/100ML-% SOLUTION: Performed by: SURGERY

## 2021-08-15 PROCEDURE — 83735 ASSAY OF MAGNESIUM: CPT | Performed by: SURGERY

## 2021-08-15 PROCEDURE — 85007 BL SMEAR W/DIFF WBC COUNT: CPT | Performed by: SURGERY

## 2021-08-15 PROCEDURE — 84100 ASSAY OF PHOSPHORUS: CPT | Performed by: INTERNAL MEDICINE

## 2021-08-15 PROCEDURE — 85027 COMPLETE CBC AUTOMATED: CPT | Performed by: SURGERY

## 2021-08-15 PROCEDURE — 85025 COMPLETE CBC W/AUTO DIFF WBC: CPT | Performed by: SURGERY

## 2021-08-15 PROCEDURE — 94799 UNLISTED PULMONARY SVC/PX: CPT

## 2021-08-15 PROCEDURE — 85025 COMPLETE CBC W/AUTO DIFF WBC: CPT | Performed by: INTERNAL MEDICINE

## 2021-08-15 PROCEDURE — 25010000002 MAGNESIUM SULFATE IN D5W 1G/100ML (PREMIX) 1-5 GM/100ML-% SOLUTION: Performed by: PHYSICIAN ASSISTANT

## 2021-08-15 PROCEDURE — 82962 GLUCOSE BLOOD TEST: CPT

## 2021-08-15 PROCEDURE — 85007 BL SMEAR W/DIFF WBC COUNT: CPT | Performed by: INTERNAL MEDICINE

## 2021-08-15 PROCEDURE — 63710000001 INSULIN ASPART PER 5 UNITS: Performed by: SURGERY

## 2021-08-15 PROCEDURE — 93010 ELECTROCARDIOGRAM REPORT: CPT | Performed by: INTERNAL MEDICINE

## 2021-08-15 PROCEDURE — 84100 ASSAY OF PHOSPHORUS: CPT | Performed by: SURGERY

## 2021-08-15 PROCEDURE — 99232 SBSQ HOSP IP/OBS MODERATE 35: CPT | Performed by: INTERNAL MEDICINE

## 2021-08-15 PROCEDURE — 80048 BASIC METABOLIC PNL TOTAL CA: CPT | Performed by: SURGERY

## 2021-08-15 PROCEDURE — 74018 RADEX ABDOMEN 1 VIEW: CPT

## 2021-08-15 PROCEDURE — 93005 ELECTROCARDIOGRAM TRACING: CPT | Performed by: SURGERY

## 2021-08-15 RX ORDER — MAGNESIUM SULFATE 1 G/100ML
1 INJECTION INTRAVENOUS ONCE
Status: DISCONTINUED | OUTPATIENT
Start: 2021-08-15 | End: 2021-08-18 | Stop reason: SDUPTHER

## 2021-08-15 RX ORDER — MAGNESIUM SULFATE 1 G/100ML
1 INJECTION INTRAVENOUS ONCE
Status: COMPLETED | OUTPATIENT
Start: 2021-08-15 | End: 2021-08-15

## 2021-08-15 RX ADMIN — INSULIN ASPART 5 UNITS: 100 INJECTION, SOLUTION INTRAVENOUS; SUBCUTANEOUS at 08:44

## 2021-08-15 RX ADMIN — CARVEDILOL 12.5 MG: 6.25 TABLET, FILM COATED ORAL at 08:45

## 2021-08-15 RX ADMIN — MAGNESIUM SULFATE HEPTAHYDRATE 1 G: 1 INJECTION, SOLUTION INTRAVENOUS at 18:14

## 2021-08-15 RX ADMIN — OXYBUTYNIN CHLORIDE 10 MG: 5 TABLET, EXTENDED RELEASE ORAL at 08:44

## 2021-08-15 RX ADMIN — SODIUM PHOSPHATE, MONOBASIC, MONOHYDRATE AND SODIUM PHOSPHATE, DIBASIC, ANHYDROUS 15 MMOL: 276; 142 INJECTION, SOLUTION INTRAVENOUS at 04:35

## 2021-08-15 RX ADMIN — HEPARIN SODIUM 5000 UNITS: 5000 INJECTION INTRAVENOUS; SUBCUTANEOUS at 05:37

## 2021-08-15 RX ADMIN — SODIUM CHLORIDE 100 ML/HR: 9 INJECTION, SOLUTION INTRAVENOUS at 18:13

## 2021-08-15 RX ADMIN — PANTOPRAZOLE SODIUM 40 MG: 40 TABLET, DELAYED RELEASE ORAL at 05:37

## 2021-08-15 RX ADMIN — PROCHLORPERAZINE EDISYLATE 5 MG: 5 INJECTION INTRAMUSCULAR; INTRAVENOUS at 02:31

## 2021-08-15 RX ADMIN — LOSARTAN POTASSIUM 50 MG: 50 TABLET, FILM COATED ORAL at 08:44

## 2021-08-15 RX ADMIN — ATORVASTATIN CALCIUM 10 MG: 10 TABLET, FILM COATED ORAL at 20:07

## 2021-08-15 RX ADMIN — AMLODIPINE BESYLATE 5 MG: 5 TABLET ORAL at 08:45

## 2021-08-15 RX ADMIN — SODIUM PHOSPHATE, MONOBASIC, MONOHYDRATE AND SODIUM PHOSPHATE, DIBASIC, ANHYDROUS 15 MMOL: 276; 142 INJECTION, SOLUTION INTRAVENOUS at 22:04

## 2021-08-15 RX ADMIN — SODIUM CHLORIDE 500 ML: 9 INJECTION, SOLUTION INTRAVENOUS at 18:53

## 2021-08-15 RX ADMIN — ASPIRIN 81 MG: 81 TABLET, CHEWABLE ORAL at 08:45

## 2021-08-15 RX ADMIN — INSULIN ASPART 3 UNITS: 100 INJECTION, SOLUTION INTRAVENOUS; SUBCUTANEOUS at 11:29

## 2021-08-15 RX ADMIN — CARVEDILOL 12.5 MG: 6.25 TABLET, FILM COATED ORAL at 18:14

## 2021-08-15 RX ADMIN — MAGNESIUM SULFATE HEPTAHYDRATE 1 G: 1 INJECTION, SOLUTION INTRAVENOUS at 03:27

## 2021-08-15 NOTE — PROGRESS NOTES
"Patient Name:  Rachana Justin  YOB: 1940  [id number]    Surgery Progress Note    Date of visit: 8/15/2021    Subjective   Subjective:   Postop day 3  Patient developed vomiting overnight and intermittent nausea.  This morning on rounds she had not had any further vomiting and nausea was controlled with medication.  However, the patient does report she has been passing a small amount of flatus.  Did not ambulate as much yesterday.         Objective     Objective:     /58 (BP Location: Left arm, Patient Position: Lying)   Pulse 70   Temp 98.3 °F (36.8 °C) (Oral)   Resp 18   Ht 170.2 cm (67\")   Wt 90.4 kg (199 lb 6.4 oz) Comment: RN JOHN AWARE  SpO2 98%   BMI 31.23 kg/m²     Intake/Output Summary (Last 24 hours) at 8/15/2021 0826  Last data filed at 8/15/2021 0300  Gross per 24 hour   Intake 780 ml   Output 700 ml   Net 80 ml       Constitution: No acute distress  CV:  Rhythm  regular and rate regular   L:  Symmetric chest expansion. No respiratory distress  Abd:   soft, mildly distended, appropriately tender to palpation.  Incisions are clean, dry and intact with some mild ecchymosis surrounding  Ext:  No cyanosis, clubbing, edema    Recent labs that are back at this time have been reviewed.     White blood cell count 11.7 from 11.22  Glucoses have ranged from 1   Phos is low this morning at 1.9  Hemoglobin stable at 8       Assessment/Plan     Assessment/ Plan:  81-year-old diabetic female status post exploratory laparotomy and small bowel resection for mesenteric adenopathy complicated by postoperative ileus    -Diet changed to n.p.o. with sips of water, ice chips and popsicles.  I am encouraged that the patient is passing a small amount of flatus.  -Continue p.o. pain control          Arthur Marquez MD  [facility] Surgery  8/15/2021  08:26 EDT    "

## 2021-08-15 NOTE — PLAN OF CARE
Goal Outcome Evaluation:           Progress: improving  Outcome Summary: Patient resting in bed. Family at bedside. Patient has ambulated to bathroom multiple times today. No acute changes or complaints noted.

## 2021-08-15 NOTE — PLAN OF CARE
Goal Outcome Evaluation:  Plan of Care Reviewed With: patient        Progress: declining  Outcome Summary: Pt rang out needing to go to restroom. Upon getting to room patient said she was going to puke after sitting up. Pt's emesis was dark brown in color. PA notified and orders for KUB placed. PRN compazine given. Will cont to monitor.

## 2021-08-15 NOTE — PROGRESS NOTES
"    [facility] HOSPITALIST PROGRESS NOTE     Patient Identification:  Name:  Rachana Justin  Age:  81 y.o.  Sex:  female  :  1940  MRN:  [id number]  Visit Number:  [id number]  ROOM: [address] [place] [address]     Primary Care Provider:  Shabnam De La Rosa MD    Length of stay in inpatient status:  3    Subjective     Chief Compliant:  No chief complaint on file.      History of Presenting Illness:    Patient had some abdominal pain overnight. Reports it is better today. Reports passing gas and \"bubbles\" but no stool.     ROS:  Otherwise 10 point ROS negative other than documented above in HPI.     Objective     Current Hospital Meds:amLODIPine, 5 mg, Oral, Q24H  aspirin, 81 mg, Oral, Daily  atorvastatin, 10 mg, Oral, Nightly  carvedilol, 12.5 mg, Oral, BID With Meals  heparin (porcine), 5,000 Units, Subcutaneous, Q8H  insulin aspart, 0-14 Units, Subcutaneous, TID AC  losartan, 50 mg, Oral, Daily  oxybutynin XL, 10 mg, Oral, Daily  pantoprazole, 40 mg, Oral, Q AM    sodium chloride, 100 mL/hr, Last Rate: 100 mL/hr (21 1732)        Current Antimicrobial Therapy:  Anti-Infectives (From admission, onward)    Ordered     Dose/Rate Route Frequency Start Stop    21 0739  ampicillin-sulbactam (UNASYN) 3 g in sodium chloride 0.9 % 100 mL IVPB-VTB     Ordering Provider: Tryo Elkins MD    3 g Intravenous Once 21 0741 21 0858        Current Diuretic Therapy:  Diuretics (From admission, onward)    None        ----------------------------------------------------------------------------------------------------------------------  Vital Signs:  Temp:  [97.7 °F (36.5 °C)-98.4 °F (36.9 °C)] 98.3 °F (36.8 °C)  Heart Rate:  [69-70] 70  Resp:  [18] 18  BP: (126-163)/(55-58) 148/58  SpO2:  [96 %-98 %] 98 %  on   ;   Device (Oxygen Therapy): room air  Body mass index is 31.23 kg/m².    Wt Readings from Last 3 Encounters:   08/15/21 90.4 kg (199 lb 6.4 oz)   21 83.9 kg (185 lb)   21 83.9 kg " (185 lb)     Intake & Output (last 3 days)       08/12 0701 - 08/13 0700 08/13 0701 - 08/14 0700 08/14 0701 - 08/15 0700 08/15 0701 - 08/16 0700    P.O.  0    I.V. (mL/kg) 1462 (17.4)       Total Intake(mL/kg) 2422 (28.9) 280 (3.3) 1140 (12.6) 0 (0)    Urine (mL/kg/hr) 2180 3900 (1.9) 700 (0.3)     Emesis/NG output  0      Stool  0 0     Total Output 2180 3900 700     Net +242 -3620 +440 0            Urine Unmeasured Occurrence  4 x 1 x     Stool Unmeasured Occurrence  0 x 0 x     Emesis Unmeasured Occurrence  1 x          NPO Diet NPO Except: Ice Chips, Sips With Meds  ----------------------------------------------------------------------------------------------------------------------  Physical exam:  Constitutional:  Well-developed and well-nourished.  No respiratory distress.      HENT:  Head:  Normocephalic and atraumatic.  Mouth:  Moist mucous membranes.    Eyes:  Conjunctivae and EOM are normal. No scleral icterus.    Neck:  Neck supple.  No JVD present.    Cardiovascular:  Normal rate, regular rhythm and normal heart sounds with no murmur.  Pulmonary/Chest:  No respiratory distress, no wheezes, no crackles, with normal breath sounds and good air movement.  Abdominal:  Soft.  Bowel sounds are normal.  No distension and no tenderness.   Musculoskeletal:  No edema, no tenderness, and no deformity.  No red or swollen joints anywhere.    Neurological:  Alert and oriented to person, place, and time.  No cranial nerve deficit.  No tongue deviation.  No facial droop.  No slurred speech.   Skin:  Skin is warm and dry. No rash noted. No pallor.   Peripheral vascular:  Pulses in all 4 extremities with no clubbing, no cyanosis, no edema.  ----------------------------------------------------------------------------------------------------------------------  Tele:    ----------------------------------------------------------------------------------------------------------------------  Results from last 7 days    Lab Units 08/15/21  0040 08/14/21  0617 08/14/21  0212   WBC 10*3/mm3 11.75* 11.22* 11.72*   HEMOGLOBIN g/dL 8.0* 7.9* 9.0*   HEMATOCRIT % 27.5* 26.4* 30.4*   MCV fL 97.2* 92.3 92.4   MCHC g/dL 29.1* 29.9* 29.6*   PLATELETS 10*3/mm3 123* 141 125*         Results from last 7 days   Lab Units 08/15/21  0040 08/14/21  0617 08/14/21 0212   SODIUM mmol/L 131* 131* 130*   POTASSIUM mmol/L 4.7 4.7 4.8   MAGNESIUM mg/dL 1.8  --  1.6   CHLORIDE mmol/L 103 102 99   CO2 mmol/L 19.6* 23.4 21.9*   BUN mg/dL 46* 38* 37*   CREATININE mg/dL 0.91 0.86 0.90   EGFR IF NONAFRICN AM mL/min/1.73 59* 63 60*   CALCIUM mg/dL 9.1 9.2 9.6   PHOSPHORUS mg/dL 1.9*  --  1.7*   GLUCOSE mg/dL 188* 207* 202*   ALBUMIN g/dL  --  2.96*  --    BILIRUBIN mg/dL  --  0.6  --    ALK PHOS U/L  --  98  --    AST (SGOT) U/L  --  11  --    ALT (SGPT) U/L  --  7  --    Estimated Creatinine Clearance: 54.1 mL/min (by C-G formula based on SCr of 0.91 mg/dL).  No results found for: AMMONIA              Hemoglobin A1C   Date/Time Value Ref Range Status   08/13/2021 0449 8.20 (H) 4.80 - 5.60 % Final     Glucose   Date/Time Value Ref Range Status   08/15/2021 1105 190 (H) 70 - 130 mg/dL Final     Comment:     Meter: [id number] : [id number] AMEYA CALL   08/15/2021 0641 217 (H) 70 - 130 mg/dL Final     Comment:     Meter: [id number] : [id number] crystal coello   08/14/2021 2006 178 (H) 70 - 130 mg/dL Final     Comment:     Meter: [id number] : [id number] JOHN SMITH   08/14/2021 1702 166 (H) 70 - 130 mg/dL Final     Comment:     Meter: [id number] : [id number] Flex Shipman   08/14/2021 1032 264 (H) 70 - 130 mg/dL Final     Comment:     Meter: [id number] : [id number] Flex Shipman   08/14/2021 0654 214 (H) 70 - 130 mg/dL Final     Comment:     Meter: [id number] : [id number] maribel coello   08/13/2021 2050 232 (H) 70 - 130 mg/dL Final     Comment:     Meter: [id number] : [id number] JOHN SMITH   08/13/2021 1929 289 (H) 70 - 130  mg/dL Final     Comment:     Meter: [id number] : [id number] JOHN SMITH     Lab Results   Component Value Date    TSH 5.020 (H) 08/14/2021    FREET4 1.62 08/14/2021     No results found for: PREGTESTUR, PREGSERUM, HCG, HCGQUANT  Pain Management Panel     Pain Management Panel Latest Ref Rng & Units 5/29/2021    AMPHETAMINES SCREEN, URINE Negative Negative    BARBITURATES SCREEN Negative Negative    BENZODIAZEPINE SCREEN, URINE Negative Negative    BUPRENORPHINEUR Negative Negative    COCAINE SCREEN, URINE Negative Negative    METHADONE SCREEN, URINE Negative Negative        Brief Urine Lab Results  (Last result in the past 365 days)      Color   Clarity   Blood   Leuk Est   Nitrite   Protein   CREAT   Urine HCG        05/29/21 0406 Yellow Clear Negative Small (1+) Negative Trace             No results found for: BLOODCX  No results found for: URINECX  No results found for: WOUNDCX  No results found for: STOOLCX  No results found for: RESPCX  No results found for: AFBCX        I have personally looked at the labs and they are summarized above.  ----------------------------------------------------------------------------------------------------------------------  Detailed radiology reports for the last 24 hours:    Imaging Results (Last 24 Hours)     Procedure Component Value Units Date/Time    XR Abdomen KUB [[id number]] Collected: 08/15/21 0356     Updated: 08/15/21 0358    Narrative:      CR Abdomen 1 Vw    INDICATION:   Abdominal pain. Bowel obstruction.    COMPARISON:   None available    FINDINGS:  AP radiograph(s) of the abdomen. The renal shadows are symmetric. No renal calculi. No bladder calculi.    The bowel gas pattern is remarkable for moderate gaseous distention of small bowel loops especially toward the left side of the abdomen with mild distention of the colon. The finding is nonspecific and could reflect either a partial mechanical  obstruction involving the distal colon or small bowel, or could  represent an ileus. Postoperative changes are seen in the right side of the abdomen. No acute osseous abnormalities.      Impression:      Nonspecific bowel gas pattern with distention of both small and large bowel. The small bowel distention is relatively greater. This could reflect either distal colonic or small bowel obstruction as well as ileus.    Signer Name: Tre Campos MD   Signed: 8/15/2021 3:56 AM   Workstation Name: [facility]-    [facility]        Assessment & Plan    #Mesenteric lymphadenopathy s/p resection of proximal ileum and enlarged lymph nodes   #Postoperative ileus   - Plain film overnight revealed distention of small and large bowel.   - Patient passing gas but has not had BM since procedure.   - Managed by primary team. NPO. Biopsy pending.     #DM II  - Better controlled  with moderate-high dose SSI  - Patient takes 50 units of basal insulin at home, Restarted at 15 yesterday. Patient now NPO, will hold this evening's dose.     #Electrolyte abnormalities including hypomagnesemia, hypophosphatemia, hyperkalemia (resolved)  - Replaced per protocol. Potassium wnl. Repeat in AM.     #CKD  - Cr at baseline     #Chronic afib  - Rate controlled.   - Currently on SubQ heparin for prophylaxis. Timing on when to restart eliquis per surgery. Anemia stable today.     #Postoperative anemia  - Hemoglobin trended down from 11 preoperatively to 7.9. Currently stable at 8.0.   - Iron studies consistent with combination of AOCD and PAULINE.   - No active signs of bleeding. Will continue to monitor.     #Chronic diastolic heart failure   #Moderate MVR  #Moderate to severe TVR  #HTN  #HLD  - Continue amlodipine, coreg, atorvastatin, losartan.     Hx of COVID 19 infection in 6/21. Now PCR negative.     Currently getting 100 cc/hr. As PO intake improves would consider stopping to prevent volume overload.     Thank you for allowing us to take part in the care of the patient. Please call with  any questions.     VTE Prophylaxis:   Mechanical Order History:     None      Pharmalogical Order History:      Ordered     Dose Route Frequency Stop    08/12/21 1223  heparin (porcine) 5000 UNIT/ML injection 5,000 Units      5,000 Units SC Every 8 Hours Scheduled --                  Jamaal Franco MD  [facility]  08/15/21  15:11 EDT

## 2021-08-15 NOTE — PROGRESS NOTES
"    [facility] Medicine Services  CROSS COVER NOTE    Contacted by SAM No stating that the patient was vomiting what appeared to be gastric contents. I evaluated the patient at bedside and there was dark brown, liquid emesis in a basin next to the bed. No evidence of hematemesis or coffee ground emesis noted. The patient reports that she is feeling \"much better\" after vomiting and is still experiencing some abdominal tenderness after her procedure. She has yet to have a bowel movement but has been passing gas.    Her bowel sounds are hyperactive and her abdomen is distended and mildly firm to palpation. She exhibits tenderness around her surgical incisions, which appear to be healing well.     I have ordered a STAT KUB to be obtained and we will give her a dose of her PRN Compazine.     Update: X-ray KUB shows nonspecific bowel gas pattern with distention of both small and large bowel, small bowel distention is relatively greater; could reflect either distal colonic or small bowel obstruction as well as ileus.         I reevaluated the patient at bedside and she was asleep and appeared to be resting well.  I did not wake her.  SAM No states she has had no further episodes of emesis.  Should the patient have repeated episodes of vomiting, can consider placing an NG tube.  She is currently on a full liquid diet.      Marah Yo PA-C  Hospitalist Service -- [facility]   Pager: [phone]    08/15/21  03:43 EDT      "

## 2021-08-16 LAB
ABO GROUP BLD: NORMAL
ABO GROUP BLD: NORMAL
ACANTHOCYTES BLD QL SMEAR: NORMAL
ANION GAP SERPL CALCULATED.3IONS-SCNC: 8.3 MMOL/L (ref 5–15)
ANISOCYTOSIS BLD QL: NORMAL
BLD GP AB SCN SERPL QL: NEGATIVE
BUN SERPL-MCNC: 39 MG/DL (ref 8–23)
BUN/CREAT SERPL: 43.3 (ref 7–25)
CALCIUM SPEC-SCNC: 8.6 MG/DL (ref 8.6–10.5)
CHLORIDE SERPL-SCNC: 107 MMOL/L (ref 98–107)
CO2 SERPL-SCNC: 22.7 MMOL/L (ref 22–29)
CREAT SERPL-MCNC: 0.9 MG/DL (ref 0.57–1)
DEPRECATED RDW RBC AUTO: 52.7 FL (ref 37–54)
EOSINOPHIL # BLD MANUAL: 0.14 10*3/MM3 (ref 0–0.4)
EOSINOPHIL NFR BLD MANUAL: 2 % (ref 0.3–6.2)
ERYTHROCYTE [DISTWIDTH] IN BLOOD BY AUTOMATED COUNT: 15.9 % (ref 12.3–15.4)
GFR SERPL CREATININE-BSD FRML MDRD: 60 ML/MIN/1.73
GLUCOSE BLDC GLUCOMTR-MCNC: 178 MG/DL (ref 70–130)
GLUCOSE BLDC GLUCOMTR-MCNC: 188 MG/DL (ref 70–130)
GLUCOSE BLDC GLUCOMTR-MCNC: 212 MG/DL (ref 70–130)
GLUCOSE BLDC GLUCOMTR-MCNC: 260 MG/DL (ref 70–130)
GLUCOSE SERPL-MCNC: 164 MG/DL (ref 65–99)
HCT VFR BLD AUTO: 21.8 % (ref 34–46.6)
HGB BLD-MCNC: 6.8 G/DL (ref 12–15.9)
HGB BLD-MCNC: 7 G/DL (ref 12–15.9)
HYPOCHROMIA BLD QL: NORMAL
LYMPHOCYTES # BLD MANUAL: 1.56 10*3/MM3 (ref 0.7–3.1)
LYMPHOCYTES NFR BLD MANUAL: 22 % (ref 19.6–45.3)
LYMPHOCYTES NFR BLD MANUAL: 7 % (ref 5–12)
MAGNESIUM SERPL-MCNC: 1.9 MG/DL (ref 1.6–2.4)
MCH RBC QN AUTO: 28.6 PG (ref 26.6–33)
MCHC RBC AUTO-ENTMCNC: 31.2 G/DL (ref 31.5–35.7)
MCV RBC AUTO: 91.6 FL (ref 79–97)
MONOCYTES # BLD AUTO: 0.5 10*3/MM3 (ref 0.1–0.9)
NEUTROPHILS # BLD AUTO: 4.91 10*3/MM3 (ref 1.7–7)
NEUTROPHILS NFR BLD MANUAL: 67 % (ref 42.7–76)
NEUTS BAND NFR BLD MANUAL: 2 % (ref 0–5)
PHOSPHATE SERPL-MCNC: 2.8 MG/DL (ref 2.5–4.5)
PLAT MORPH BLD: NORMAL
PLATELET # BLD AUTO: 158 10*3/MM3 (ref 140–450)
PMV BLD AUTO: 11 FL (ref 6–12)
POTASSIUM SERPL-SCNC: 4.1 MMOL/L (ref 3.5–5.2)
QT INTERVAL: 484 MS
QTC INTERVAL: 522 MS
RBC # BLD AUTO: 2.38 10*6/MM3 (ref 3.77–5.28)
RH BLD: POSITIVE
RH BLD: POSITIVE
SCAN SLIDE: NORMAL
SODIUM SERPL-SCNC: 138 MMOL/L (ref 136–145)
T&S EXPIRATION DATE: NORMAL
WBC # BLD AUTO: 7.11 10*3/MM3 (ref 3.4–10.8)

## 2021-08-16 PROCEDURE — 86900 BLOOD TYPING SEROLOGIC ABO: CPT | Performed by: SURGERY

## 2021-08-16 PROCEDURE — 84100 ASSAY OF PHOSPHORUS: CPT | Performed by: INTERNAL MEDICINE

## 2021-08-16 PROCEDURE — 86923 COMPATIBILITY TEST ELECTRIC: CPT

## 2021-08-16 PROCEDURE — 80048 BASIC METABOLIC PNL TOTAL CA: CPT | Performed by: INTERNAL MEDICINE

## 2021-08-16 PROCEDURE — 25010000002 MAGNESIUM SULFATE IN D5W 1G/100ML (PREMIX) 1-5 GM/100ML-% SOLUTION: Performed by: SURGERY

## 2021-08-16 PROCEDURE — 85018 HEMOGLOBIN: CPT | Performed by: PHYSICIAN ASSISTANT

## 2021-08-16 PROCEDURE — 63710000001 INSULIN ASPART PER 5 UNITS: Performed by: SURGERY

## 2021-08-16 PROCEDURE — 82962 GLUCOSE BLOOD TEST: CPT

## 2021-08-16 PROCEDURE — 86850 RBC ANTIBODY SCREEN: CPT | Performed by: SURGERY

## 2021-08-16 PROCEDURE — 86901 BLOOD TYPING SEROLOGIC RH(D): CPT | Performed by: SURGERY

## 2021-08-16 PROCEDURE — 85007 BL SMEAR W/DIFF WBC COUNT: CPT | Performed by: INTERNAL MEDICINE

## 2021-08-16 PROCEDURE — 83735 ASSAY OF MAGNESIUM: CPT | Performed by: SURGERY

## 2021-08-16 PROCEDURE — 99232 SBSQ HOSP IP/OBS MODERATE 35: CPT | Performed by: PHYSICIAN ASSISTANT

## 2021-08-16 PROCEDURE — 86900 BLOOD TYPING SEROLOGIC ABO: CPT

## 2021-08-16 PROCEDURE — 86901 BLOOD TYPING SEROLOGIC RH(D): CPT

## 2021-08-16 PROCEDURE — 94799 UNLISTED PULMONARY SVC/PX: CPT

## 2021-08-16 PROCEDURE — 99024 POSTOP FOLLOW-UP VISIT: CPT | Performed by: SURGERY

## 2021-08-16 PROCEDURE — 85025 COMPLETE CBC W/AUTO DIFF WBC: CPT | Performed by: INTERNAL MEDICINE

## 2021-08-16 RX ORDER — CARVEDILOL 6.25 MG/1
6.25 TABLET ORAL 2 TIMES DAILY WITH MEALS
Status: DISCONTINUED | OUTPATIENT
Start: 2021-08-16 | End: 2021-08-18 | Stop reason: HOSPADM

## 2021-08-16 RX ORDER — MAGNESIUM SULFATE 1 G/100ML
1 INJECTION INTRAVENOUS ONCE
Status: COMPLETED | OUTPATIENT
Start: 2021-08-16 | End: 2021-08-16

## 2021-08-16 RX ADMIN — INSULIN ASPART 3 UNITS: 100 INJECTION, SOLUTION INTRAVENOUS; SUBCUTANEOUS at 08:31

## 2021-08-16 RX ADMIN — ATORVASTATIN CALCIUM 10 MG: 10 TABLET, FILM COATED ORAL at 20:35

## 2021-08-16 RX ADMIN — PANTOPRAZOLE SODIUM 40 MG: 40 TABLET, DELAYED RELEASE ORAL at 05:26

## 2021-08-16 RX ADMIN — MAGNESIUM SULFATE HEPTAHYDRATE 1 G: 1 INJECTION, SOLUTION INTRAVENOUS at 04:51

## 2021-08-16 RX ADMIN — INSULIN ASPART 5 UNITS: 100 INJECTION, SOLUTION INTRAVENOUS; SUBCUTANEOUS at 11:10

## 2021-08-16 RX ADMIN — SODIUM CHLORIDE 100 ML/HR: 9 INJECTION, SOLUTION INTRAVENOUS at 17:01

## 2021-08-16 RX ADMIN — OXYBUTYNIN CHLORIDE 10 MG: 5 TABLET, EXTENDED RELEASE ORAL at 08:32

## 2021-08-16 RX ADMIN — INSULIN ASPART 5 UNITS: 100 INJECTION, SOLUTION INTRAVENOUS; SUBCUTANEOUS at 17:01

## 2021-08-16 NOTE — PLAN OF CARE
Goal Outcome Evaluation:  Plan of Care Reviewed With: patient        Progress: no change  Outcome Summary: Pt rested in bed throughout shift. Had one BM that had some bright red blood present. No complaints. Magnesium being replaced per protocol.

## 2021-08-16 NOTE — PLAN OF CARE
Goal Outcome Evaluation:           Progress: no change  Outcome Summary: Patient resting in bed. No complaints at this time. No acute changes.

## 2021-08-16 NOTE — CASE MANAGEMENT/SOCIAL WORK
Discharge Planning Assessment   [place]     Patient Name: Rachana Justin  MRN: [id number]  Today's Date: 8/16/2021    Admit Date: 8/12/2021    Discharge Plan     Row Name 08/16/21 1525       Plan    Plan SS spoke to pt and she plans to return home at discharge. Pt lives with her spouse. Pt has good family support. Pt does not utilize home health services. Pt has a cane and a rolling walker at home. SS to follow.        TORI Jarquin

## 2021-08-16 NOTE — PROGRESS NOTES
Chief complaint: Mesenteric lymphadenopathy    Postoperative day 4: Exploratory laparotomy, small bowel resection    Patient doing well this morning.  Having bowel movements.  No further blood in bowel movements.  She remains hemodynamically stable.    Afebrile, vital signs stable  Clear to auscultation bilaterally  Abdomen soft, appropriately tender, incision clean dry and intact    81-year-old female postoperative day 4 following exploratory laparotomy with small bowel resection for large bulky mesenteric lymphadenopathy.  -Advance to soft diet  -Ambulate  -Anticipate discharge in 24 to 48 hours  -Continue to replace electrolytes as indicated

## 2021-08-16 NOTE — PROGRESS NOTES
[facility] HOSPITALIST PROGRESS NOTE     Patient Identification:  Name:  Rachana Justin  Age:  81 y.o.  Sex:  female  :  1940  MRN:  [id number]  Visit Number:  [id number]  Primary Care Provider:  Shabnam De La Rosa MD    Date of admission: 2021  Length of stay:  4    ----------------------------------------------------------------------------------------------------------------------  Subjective     Chief Complaint: Consult for IDDM, hyponatremia, hyperkalemia.     Subjective/Interval History:    81 y.o. female who was admitted on 2021 by general surgery to undergo a diagnostic laparoscopy secondary to large mesenteric lymphadenopathy. Large mesenteric lymph nodes were found at the root of the mesentery of a 56 cm segment of the proximal ileum that was then resected including the massively enlarged nodes.  There were multiple reactive nodes and mildly enlarged nodes that were palpated in the root of the mesentery but were unable to be resected due to fear compromise of the small bowel blood supply. The patient tolerated the procedure well and had minimal blood loss. Hospitalist services were consulted secondary to uncontrolled type 2 diabetes mellitus with hyperglycemia, hyponatremia, and hyperkalemia.    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 For complete admission information, please see history and physical.     Procedures/Scans:  1. Diagnostic laparoscopy converted to open with small bowel resection, Dr. Elkins, 2021.    Today, the patient is sitting up in chair.  Reports that she is doing well.  Her abdomen is sore, but denies any significant pain.  Denies any  "shortness of breath or cough.  Discussed with RNJailene.  Patient had a bowel movement yesterday or the day prior which had large amounts of bright red blood.  This was her first BM postop.  No further bleeding, but no further bowel movements reported.  H/H slightly decreased from yesterday on a.m. labs, overall stable at 7.0 on repeat.  BP has been borderline low this morning, a.m. antihypertensives held and she did receive a 500 mL normal saline bolus last evening..  No other new events or concerns reported. She would like to eat, this is pending surgery's decision on advancing diet.     Review of Systems   Constitutional: Negative for chills and fever.   Respiratory: Negative for cough, shortness of breath and wheezing.    Cardiovascular: Negative for chest pain and leg swelling.   Gastrointestinal: Positive for abdominal pain (\"Soreness at surgical site\") and blood in stool. Negative for nausea and vomiting.   Genitourinary: Negative for difficulty urinating and dysuria.     Present during exam: Patient's daughter present at bedside.  ----------------------------------------------------------------------------------------------------------------------  Objective   Current Hospital Meds:  amLODIPine, 5 mg, Oral, Q24H  aspirin, 81 mg, Oral, Daily  atorvastatin, 10 mg, Oral, Nightly  carvedilol, 12.5 mg, Oral, BID With Meals  heparin (porcine), 5,000 Units, Subcutaneous, Q8H  insulin aspart, 0-14 Units, Subcutaneous, TID AC  magnesium sulfate in D5W 1g/100mL (PREMIX), 1 g, Intravenous, Once  oxybutynin XL, 10 mg, Oral, Daily  pantoprazole, 40 mg, Oral, Q AM      sodium chloride, 100 mL/hr, Last Rate: 100 mL/hr (08/15/21 2436)      ----------------------------------------------------------------------------------------------------------------------  Vital Signs:  Temp:  [97.4 °F (36.3 °C)-98.4 °F (36.9 °C)] 97.9 °F (36.6 °C)  Heart Rate:  [70-98] 70  Resp:  [16] 16  BP: ()/(44-52) 110/51  No data found.  SpO2 " Percentage    08/15/21 2150 08/16/21 0300 08/16/21 0600   SpO2: 95% 95% 93%     SpO2:  [93 %-98 %] 93 %  on   ;   Device (Oxygen Therapy): room air    Body mass index is 30.85 kg/m².  Wt Readings from Last 3 Encounters:   08/16/21 89.4 kg (197 lb)   07/27/21 83.9 kg (185 lb)   07/14/21 83.9 kg (185 lb)        Intake/Output Summary (Last 24 hours) at 8/16/2021 0910  Last data filed at 8/16/2021 0630  Gross per 24 hour   Intake 7176.44 ml   Output 600 ml   Net 6576.44 ml     Diet Clear Liquid  ----------------------------------------------------------------------------------------------------------------------  Physical exam:  Constitutional: Vital signs reviewed. Well-developed and well-nourished. Sitting up in chair.  No respiratory distress on room air.   HENT:  Head:  Normocephalic and atraumatic.  Mouth:  Moist mucous membranes.    Eyes:  Conjunctivae and EOM are normal. No scleral icterus. No erythema or drainage.  Neck:  Neck supple.   Cardiovascular:  Normal rate, regular rhythm and normal heart sounds with no murmur.  Pulmonary/Chest:  No respiratory distress, no wheezes, no crackles, with normal breath sounds and good air movement.  Abdominal: Postop abdomen with soreness around surgical site. Soft. Bowel sounds are decreased, but present x4.  No distension.  Musculoskeletal:  No edema, no tenderness, and no deformity.  No red or swollen joints anywhere.    Neurological:  Alert and oriented to person, place, and time. No facial droop.  No slurred speech.   Skin:  Skin is warm and dry. No rash noted. No pallor.   Peripheral vascular: No clubbing, no cyanosis, no edema.  Genitourinary: No narayanan catheter in place.     I have reviewed and updated the physical exam as needed to reflect that of 08/16/21  ----------------------------------------------------------------------------------------------------------------------  Tele: Not on telemetry monitoring.      ----------------------------------------------------------------------------------------------------------------------            Results from last 7 days   Lab Units 08/16/21  0213 08/16/21  0054 08/15/21  2126 08/15/21  1652 08/15/21  1652   WBC 10*3/mm3  --  7.11 7.99  --  8.49   HEMOGLOBIN g/dL 7.0* 6.8* 7.0*   < > 7.0*   HEMATOCRIT %  --  21.8* 22.4*  --  22.7*   MCV fL  --  91.6 91.4  --  89.4   MCHC g/dL  --  31.2* 31.3*  --  30.8*   PLATELETS 10*3/mm3  --  158 150  --  154    < > = values in this interval not displayed.     Results from last 7 days   Lab Units 08/16/21  0054 08/15/21  1652 08/15/21  1630 08/15/21  0040 08/14/21  0617 08/14/21  0617   SODIUM mmol/L 138 134*  --  131*   < > 131*   POTASSIUM mmol/L 4.1 4.0  --  4.7   < > 4.7   MAGNESIUM mg/dL 1.9  --  1.7 1.8  --   --    CHLORIDE mmol/L 107 103  --  103   < > 102   CO2 mmol/L 22.7 22.4  --  19.6*   < > 23.4   BUN mg/dL 39* 42*  --  46*   < > 38*   CREATININE mg/dL 0.90 0.95  --  0.91   < > 0.86   EGFR IF NONAFRICN AM mL/min/1.73 60* 56*  --  59*   < > 63   CALCIUM mg/dL 8.6 9.2  --  9.1   < > 9.2   GLUCOSE mg/dL 164* 126*  --  188*   < > 207*   ALBUMIN g/dL  --   --   --   --   --  2.96*   BILIRUBIN mg/dL  --   --   --   --   --  0.6   ALK PHOS U/L  --   --   --   --   --  98   AST (SGOT) U/L  --   --   --   --   --  11   ALT (SGPT) U/L  --   --   --   --   --  7    < > = values in this interval not displayed.   Estimated Creatinine Clearance: 54.4 mL/min (by C-G formula based on SCr of 0.9 mg/dL).  No results found for: AMMONIA    Glucose   Date/Time Value Ref Range Status   08/16/2021 0638 188 (H) 70 - 130 mg/dL Final     Comment:     Meter: [id number] : [id number] AZIZA PERAZA   08/15/2021 2010 200 (H) 70 - 130 mg/dL Final     Comment:     Meter: [id number] : [id number] JOHN SMITH   08/15/2021 1659 121 70 - 130 mg/dL Final     Comment:     Meter: [id number] : [id number] AMEYA CALL   08/15/2021 1105 190 (H) 70 - 130 mg/dL  Final     Comment:     Meter: [id number] : [id number] AMEYA CALL   08/15/2021 0641 217 (H) 70 - 130 mg/dL Final     Comment:     Meter: [id number] : [id number] maribel asherd   08/14/2021 2006 178 (H) 70 - 130 mg/dL Final     Comment:     Meter: [id number] : [id number] JOHN SMITH   08/14/2021 1702 166 (H) 70 - 130 mg/dL Final     Comment:     Meter: [id number] : [id number] Flex Shipman   08/14/2021 1032 264 (H) 70 - 130 mg/dL Final     Comment:     Meter: [id number] : [id number] Flex Shipman     Lab Results   Component Value Date    HGBA1C 8.20 (H) 08/13/2021     Lab Results   Component Value Date    TSH 5.020 (H) 08/14/2021    FREET4 1.62 08/14/2021     No results found for: BLOODCX  No results found for: URINECX  No results found for: WOUNDCX  No results found for: STOOLCX  No results found for: RESPCX    Pain Management Panel     Pain Management Panel Latest Ref Rng & Units 5/29/2021    AMPHETAMINES SCREEN, URINE Negative Negative    BARBITURATES SCREEN Negative Negative    BENZODIAZEPINE SCREEN, URINE Negative Negative    BUPRENORPHINEUR Negative Negative    COCAINE SCREEN, URINE Negative Negative    METHADONE SCREEN, URINE Negative Negative        I have personally reviewed the above laboratory results for 08/16/21  ----------------------------------------------------------------------------------------------------------------------  Imaging Results (Last 24 Hours)     ** No results found for the last 24 hours. **        ----------------------------------------------------------------------------------------------------------------------  Assessment/Plan     Mesenteric lymphadenopathy status post resection approximately 8/12, by Dr. Elkins  Postoperative ileus  · Reports bowel movement 8/15 bright red blood present.  Continues to pass flatus.  · Being managed by the primary team.   · Pathology report pending.   · Patient on clear liquid diet.     Type II DM, insulin requiring,  uncontrolled with hyperglycemia   · Hemoglobin A1c 8.2%.   · Glucose 121-188 last 24 hrs.   · Continue SSI 0-14U.   · Consult DM educator if not completed this admission.     Multiple electrolyte abnormalities  Hypomagnesemia   Hypophosphatemia   Hyperkalemia   Hyponatremia   · Potassium and sodium normalized.   · Replace mag and phos PRN per protocol.     Postoperative anemia  · Hemoglobin trended down from 11 preoperatively to 7.0.   · Iron studies consistent with combination of AOCD and PAULINE.   · Bright red blood in BM reported from 08/15 (documented on 08/16 however, per patient BM was left in commode overnight and was from 08/15).   · Hemoglobin down to 6.8, stable from yesterday at 7.0 on repeat.   · Transfuse for hemoglobin <7 or symptomatic anemia. Denies dizziness, shortness of breath, chest pains.     CKD stage II/IIIa  · Baseline creatinine 0.9-1.0.  · Creatinine remains at baseline.   · BUN elevated, improving. May be related to postop GI blood loss.   · BMP in AM.     Chronic afib  · Rate controlled.   · Currently on SubQ heparin for prophylaxis. H/H low, but stable from last few days.   · Timing on when to restart eliquis per surgery.    Chronic diastolic heart failure   Moderate MVR  Moderate to severe TVR  HLD  · Will consider decreasing/discontinuing IV fluids as diet is advanced.   · Currently on clear liquid diet.   · Monitor for volume overload.     Essential hypertension   · BP borderline low last evening with SBP in the 90s, improved with IVF bolus.   · Will hold amlodipine for now and decrease coreg to 6.25mg with continued holding parameters.     Hx of COVID-19 infection in 6/21  · Now PCR negative.     DVT Prophylaxis  · Subq heparin.     The patient is high risk due to the following diagnoses/reasons:     I have discussed the patient's assessment and plan with the patient, SAM Dent, and my attending physician, Dr. Kurtz.    Disposition:   · Per primary team.     Discharge needs:  • None at  this time.     AQUILINO Gray  08/16/21  09:10 EDT

## 2021-08-17 LAB
ANION GAP SERPL CALCULATED.3IONS-SCNC: 9.3 MMOL/L (ref 5–15)
BASOPHILS # BLD AUTO: 0.01 10*3/MM3 (ref 0–0.2)
BASOPHILS NFR BLD AUTO: 0.2 % (ref 0–1.5)
BUN SERPL-MCNC: 24 MG/DL (ref 8–23)
BUN/CREAT SERPL: 28.2 (ref 7–25)
CALCIUM SPEC-SCNC: 8.1 MG/DL (ref 8.6–10.5)
CHLORIDE SERPL-SCNC: 108 MMOL/L (ref 98–107)
CO2 SERPL-SCNC: 17.7 MMOL/L (ref 22–29)
CREAT SERPL-MCNC: 0.85 MG/DL (ref 0.57–1)
DEPRECATED RDW RBC AUTO: 55.1 FL (ref 37–54)
EOSINOPHIL # BLD AUTO: 0.12 10*3/MM3 (ref 0–0.4)
EOSINOPHIL NFR BLD AUTO: 1.9 % (ref 0.3–6.2)
ERYTHROCYTE [DISTWIDTH] IN BLOOD BY AUTOMATED COUNT: 16 % (ref 12.3–15.4)
GFR SERPL CREATININE-BSD FRML MDRD: 64 ML/MIN/1.73
GLUCOSE BLDC GLUCOMTR-MCNC: 165 MG/DL (ref 70–130)
GLUCOSE BLDC GLUCOMTR-MCNC: 189 MG/DL (ref 70–130)
GLUCOSE BLDC GLUCOMTR-MCNC: 194 MG/DL (ref 70–130)
GLUCOSE BLDC GLUCOMTR-MCNC: 247 MG/DL (ref 70–130)
GLUCOSE SERPL-MCNC: 166 MG/DL (ref 65–99)
HCT VFR BLD AUTO: 21.3 % (ref 34–46.6)
HCT VFR BLD AUTO: 26.1 % (ref 34–46.6)
HGB BLD-MCNC: 6.3 G/DL (ref 12–15.9)
HGB BLD-MCNC: 7.9 G/DL (ref 12–15.9)
IMM GRANULOCYTES # BLD AUTO: 0.03 10*3/MM3 (ref 0–0.05)
IMM GRANULOCYTES NFR BLD AUTO: 0.5 % (ref 0–0.5)
LYMPHOCYTES # BLD AUTO: 1.38 10*3/MM3 (ref 0.7–3.1)
LYMPHOCYTES NFR BLD AUTO: 22.4 % (ref 19.6–45.3)
MAGNESIUM SERPL-MCNC: 1.7 MG/DL (ref 1.6–2.4)
MCH RBC QN AUTO: 28 PG (ref 26.6–33)
MCHC RBC AUTO-ENTMCNC: 29.6 G/DL (ref 31.5–35.7)
MCV RBC AUTO: 94.7 FL (ref 79–97)
MONOCYTES # BLD AUTO: 0.55 10*3/MM3 (ref 0.1–0.9)
MONOCYTES NFR BLD AUTO: 8.9 % (ref 5–12)
NEUTROPHILS NFR BLD AUTO: 4.08 10*3/MM3 (ref 1.7–7)
NEUTROPHILS NFR BLD AUTO: 66.1 % (ref 42.7–76)
NRBC BLD AUTO-RTO: 0 /100 WBC (ref 0–0.2)
PLATELET # BLD AUTO: 151 10*3/MM3 (ref 140–450)
PMV BLD AUTO: 10.4 FL (ref 6–12)
POTASSIUM SERPL-SCNC: 3.7 MMOL/L (ref 3.5–5.2)
RBC # BLD AUTO: 2.25 10*6/MM3 (ref 3.77–5.28)
SODIUM SERPL-SCNC: 135 MMOL/L (ref 136–145)
WBC # BLD AUTO: 6.17 10*3/MM3 (ref 3.4–10.8)

## 2021-08-17 PROCEDURE — 82962 GLUCOSE BLOOD TEST: CPT

## 2021-08-17 PROCEDURE — 99024 POSTOP FOLLOW-UP VISIT: CPT | Performed by: SURGERY

## 2021-08-17 PROCEDURE — 83735 ASSAY OF MAGNESIUM: CPT | Performed by: SURGERY

## 2021-08-17 PROCEDURE — 80048 BASIC METABOLIC PNL TOTAL CA: CPT | Performed by: PHYSICIAN ASSISTANT

## 2021-08-17 PROCEDURE — 85018 HEMOGLOBIN: CPT | Performed by: PHYSICIAN ASSISTANT

## 2021-08-17 PROCEDURE — 86900 BLOOD TYPING SEROLOGIC ABO: CPT

## 2021-08-17 PROCEDURE — 99233 SBSQ HOSP IP/OBS HIGH 50: CPT | Performed by: PHYSICIAN ASSISTANT

## 2021-08-17 PROCEDURE — 63710000001 INSULIN ASPART PER 5 UNITS: Performed by: SURGERY

## 2021-08-17 PROCEDURE — 85025 COMPLETE CBC W/AUTO DIFF WBC: CPT | Performed by: PHYSICIAN ASSISTANT

## 2021-08-17 PROCEDURE — 36430 TRANSFUSION BLD/BLD COMPNT: CPT

## 2021-08-17 PROCEDURE — P9016 RBC LEUKOCYTES REDUCED: HCPCS

## 2021-08-17 PROCEDURE — 85014 HEMATOCRIT: CPT | Performed by: PHYSICIAN ASSISTANT

## 2021-08-17 RX ADMIN — INSULIN ASPART 3 UNITS: 100 INJECTION, SOLUTION INTRAVENOUS; SUBCUTANEOUS at 17:59

## 2021-08-17 RX ADMIN — OXYBUTYNIN CHLORIDE 10 MG: 5 TABLET, EXTENDED RELEASE ORAL at 08:41

## 2021-08-17 RX ADMIN — CARVEDILOL 6.25 MG: 6.25 TABLET, FILM COATED ORAL at 08:41

## 2021-08-17 RX ADMIN — ATORVASTATIN CALCIUM 10 MG: 10 TABLET, FILM COATED ORAL at 21:06

## 2021-08-17 RX ADMIN — PANTOPRAZOLE SODIUM 40 MG: 40 TABLET, DELAYED RELEASE ORAL at 06:36

## 2021-08-17 RX ADMIN — INSULIN ASPART 3 UNITS: 100 INJECTION, SOLUTION INTRAVENOUS; SUBCUTANEOUS at 08:41

## 2021-08-17 RX ADMIN — CARVEDILOL 6.25 MG: 6.25 TABLET, FILM COATED ORAL at 17:59

## 2021-08-17 RX ADMIN — INSULIN ASPART 5 UNITS: 100 INJECTION, SOLUTION INTRAVENOUS; SUBCUTANEOUS at 11:39

## 2021-08-17 NOTE — CONSULTS
"Diabetes Education  Assessment/Teaching    Patient Name:  Rachana Justin  YOB: 1940  MRN: [id number]  Admit Date:  8/12/2021      Assessment Date:  8/17/2021    Most Recent Value   General Information    Height  170.2 cm (67\")   Height Method  Stated   Weight  89.7 kg (197 lb 12.8 oz)   Weight Method  Bed scale   Pregnancy Assessment   Diabetes History   Education Preferences   Nutrition Information   Assessment Topics   DM Goals            Most Recent Value   DM Education Needs   Meter  Has own   Frequency of Testing  2 times a day   Medication  Insulin, Oral   Problem Solving  Hypoglycemia, Hyperglycemia, Sick days, Signs, Symptoms, Treatment   Reducing Risks  Cardiovascular, Immunizations, Foot care, Dental exam, Eye exam, Blood pressure, Lipids, A1C testing, Neuropathy, Retinopathy   Healthy Eating  Basic meal plan provided   Physical Activity  Walking   Physical Activity Frequency  Occasionally   Healthy Coping  Appropriate   Discharge Plan  Home, Follow-up with PCP   Motivation  Moderate   Teaching Method  Explanation, Discussion, Handouts   Patient Response  Verbalized understanding            Other Comments:  A1C 8.2 Patient did not wear a mask. Patient was seen, educated and received ADA handouts on diet, activity, checking blood glucose, taking medication as prescribed, checking feet daily and S/S of hypo/hyperglycemia. Patient stated that she had no questions at this time. If any concerns please re-consult or call. Thank you.         Electronically signed by:  Mariel Alcaraz RN  08/17/21 14:15 EDT  "

## 2021-08-17 NOTE — PLAN OF CARE
Problem: Adult Inpatient Plan of Care  Goal: Plan of Care Review  Outcome: Ongoing, Progressing  Flowsheets  Taken 8/17/2021 0305 by Jeanette Cristobal RN  Progress: no change  Outcome Summary: No acute changes. Patient appears to be resting well at this time.  Taken 8/16/2021 0228 by Oneal Cristina RN  Plan of Care Reviewed With: patient  Goal: Patient-Specific Goal (Individualized)  Outcome: Ongoing, Progressing  Goal: Absence of Hospital-Acquired Illness or Injury  Outcome: Ongoing, Progressing  Intervention: Identify and Manage Fall Risk  Recent Flowsheet Documentation  Taken 8/17/2021 0100 by Jeanette Cristobal RN  Safety Promotion/Fall Prevention: safety round/check completed  Taken 8/16/2021 2300 by Jeanette Cristobal RN  Safety Promotion/Fall Prevention: safety round/check completed  Taken 8/16/2021 2100 by Jeanette Cristobal RN  Safety Promotion/Fall Prevention: safety round/check completed  Taken 8/16/2021 2051 by Jeanette Cristobal RN  Safety Promotion/Fall Prevention: safety round/check completed  Taken 8/16/2021 1900 by Jeanette Cristobal RN  Safety Promotion/Fall Prevention: safety round/check completed  Intervention: Prevent Skin Injury  Recent Flowsheet Documentation  Taken 8/16/2021 2051 by Jeanette Cristobal RN  Skin Protection: adhesive use limited  Goal: Optimal Comfort and Wellbeing  Outcome: Ongoing, Progressing  Intervention: Provide Person-Centered Care  Recent Flowsheet Documentation  Taken 8/16/2021 2051 by Jeanette Cristobal RN  Trust Relationship/Rapport:   thoughts/feelings acknowledged   reassurance provided   questions encouraged   questions answered   empathic listening provided   choices provided   care explained   emotional support provided  Goal: Readiness for Transition of Care  Outcome: Ongoing, Progressing   Goal Outcome Evaluation:           Progress: no change  Outcome Summary: No acute changes. Patient appears to be resting well at this  time.

## 2021-08-17 NOTE — PROGRESS NOTES
[facility] HOSPITALIST PROGRESS NOTE     Patient Identification:  Name:  Rachana Justin  Age:  81 y.o.  Sex:  female  :  1940  MRN:  [id number]  Visit Number:  [id number]  Primary Care Provider:  Shabnam De La Rosa MD    Date of admission: 2021  Length of stay:  5    ----------------------------------------------------------------------------------------------------------------------  Subjective     Chief Complaint: Consult for IDDM, hyponatremia, hyperkalemia.     Subjective/Interval History:    81 y.o. female who was admitted on 2021 by general surgery to undergo a diagnostic laparoscopy secondary to large mesenteric lymphadenopathy. Large mesenteric lymph nodes were found at the root of the mesentery of a 56 cm segment of the proximal ileum that was then resected including the massively enlarged nodes.  There were multiple reactive nodes and mildly enlarged nodes that were palpated in the root of the mesentery but were unable to be resected due to fear compromise of the small bowel blood supply. The patient tolerated the procedure well and had minimal blood loss. Hospitalist services were consulted secondary to uncontrolled type 2 diabetes mellitus with hyperglycemia, hyponatremia, and hyperkalemia.    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 For complete admission information, please see history and physical.     Procedures/Scans:  1. Diagnostic laparoscopy converted to open with small bowel resection, Dr. Elkins, .  2. Transfusion 1 unit PRBCs, .    AM labs showed decline in hemoglobin to 6.3 this AM and night shift hospitalist PA ordered 1 unit PRBCs which is  "currently being transfused. She denies any chest pains, shortness of breath, or dizziness. Overall, she is feeling some better. She is tolerating a regular diet. She had another small BM yesterday with a small amount of bright red blood present. No other bleeding reported. Discussed with RNSadie, no other new events or concerns reported.     Review of Systems   Constitutional: Negative for chills and fever.   Respiratory: Negative for cough, shortness of breath and wheezing.    Cardiovascular: Negative for chest pain and leg swelling.   Gastrointestinal: Positive for abdominal pain (\"Soreness at surgical site\") and blood in stool. Negative for nausea and vomiting.   Genitourinary: Negative for difficulty urinating and dysuria.     ----------------------------------------------------------------------------------------------------------------------  Objective   Current Hospital Meds:  atorvastatin, 10 mg, Oral, Nightly  carvedilol, 6.25 mg, Oral, BID With Meals  insulin aspart, 0-14 Units, Subcutaneous, TID AC  magnesium sulfate in D5W 1g/100mL (PREMIX), 1 g, Intravenous, Once  oxybutynin XL, 10 mg, Oral, Daily  pantoprazole, 40 mg, Oral, Q AM      sodium chloride, 100 mL/hr, Last Rate: 100 mL/hr (08/16/21 1701)      ----------------------------------------------------------------------------------------------------------------------  Vital Signs:  Temp:  [98.2 °F (36.8 °C)-98.6 °F (37 °C)] 98.5 °F (36.9 °C)  Heart Rate:  [70-99] 70  Resp:  [14-18] 18  BP: ()/(44-63) 150/62  Mean Arterial Pressure (Non-Invasive) for the past 24 hrs (Last 3 readings):   Noninvasive MAP (mmHg)   08/17/21 0726 85     SpO2 Percentage    08/16/21 2300 08/17/21 0300 08/17/21 0726   SpO2: 97% 96% 95%     SpO2:  [95 %-99 %] 95 %  on   ;   Device (Oxygen Therapy): room air    Body mass index is 30.98 kg/m².  Wt Readings from Last 3 Encounters:   08/17/21 89.7 kg (197 lb 12.8 oz)   07/27/21 83.9 kg (185 lb)   07/14/21 83.9 kg (185 " lb)        Intake/Output Summary (Last 24 hours) at 8/17/2021 0805  Last data filed at 8/17/2021 0300  Gross per 24 hour   Intake 720 ml   Output no documentation   Net 720 ml     Diet Regular; GI Soft  ----------------------------------------------------------------------------------------------------------------------  Physical exam:  Constitutional: Vital signs reviewed. Well-developed and well-nourished. Sitting up in chair.  No respiratory distress on room air.   HENT:  Head:  Normocephalic and atraumatic.  Mouth:  Moist mucous membranes.    Eyes:  Conjunctivae and EOM are normal. No scleral icterus. No erythema or drainage.  Neck:  Neck supple.   Cardiovascular:  Normal rate, regularly rhythm and normal heart sounds with no murmur.  Pulmonary/Chest:  No respiratory distress, no wheezes, no crackles, with normal breath sounds and good air movement.  Abdominal: Postop abdomen with soreness around surgical site. Soft. Bowel sounds present x4.   Musculoskeletal:  No edema, no tenderness, and no deformity.  No red or swollen joints anywhere.    Neurological:  Alert and oriented to person, place, and time. No facial droop.  No slurred speech.   Skin:  Skin is warm and dry. No rash noted. No pallor.   Peripheral vascular: No clubbing, no cyanosis, no edema.  Genitourinary: No narayanan catheter in place.     I have reviewed and updated the physical exam as needed to reflect that of 08/17/21  ----------------------------------------------------------------------------------------------------------------------  Tele: Not on telemetry monitoring.     ----------------------------------------------------------------------------------------------------------------------            Results from last 7 days   Lab Units 08/17/21  0526 08/16/21  0213 08/16/21  0054 08/15/21  2126 08/15/21  2126   WBC 10*3/mm3 6.17  --  7.11  --  7.99   HEMOGLOBIN g/dL 6.3* 7.0* 6.8*   < > 7.0*   HEMATOCRIT % 21.3*  --  21.8*  --  22.4*   MCV fL  94.7  --  91.6  --  91.4   MCHC g/dL 29.6*  --  31.2*  --  31.3*   PLATELETS 10*3/mm3 151  --  158  --  150    < > = values in this interval not displayed.     Results from last 7 days   Lab Units 08/17/21  0526 08/16/21  0054 08/15/21  1652 08/15/21  1630 08/15/21  0040 08/14/21  0617   SODIUM mmol/L 135* 138 134*  --    < > 131*   POTASSIUM mmol/L 3.7 4.1 4.0  --    < > 4.7   MAGNESIUM mg/dL 1.7 1.9  --  1.7   < >  --    CHLORIDE mmol/L 108* 107 103  --    < > 102   CO2 mmol/L 17.7* 22.7 22.4  --    < > 23.4   BUN mg/dL 24* 39* 42*  --    < > 38*   CREATININE mg/dL 0.85 0.90 0.95  --    < > 0.86   EGFR IF NONAFRICN AM mL/min/1.73 64 60* 56*  --    < > 63   CALCIUM mg/dL 8.1* 8.6 9.2  --    < > 9.2   GLUCOSE mg/dL 166* 164* 126*  --    < > 207*   ALBUMIN g/dL  --   --   --   --   --  2.96*   BILIRUBIN mg/dL  --   --   --   --   --  0.6   ALK PHOS U/L  --   --   --   --   --  98   AST (SGOT) U/L  --   --   --   --   --  11   ALT (SGPT) U/L  --   --   --   --   --  7    < > = values in this interval not displayed.   Estimated Creatinine Clearance: 57.6 mL/min (by C-G formula based on SCr of 0.85 mg/dL).  No results found for: AMMONIA    Glucose   Date/Time Value Ref Range Status   08/17/2021 0654 194 (H) 70 - 130 mg/dL Final     Comment:     Meter: [id number] : [id number] mayne mary   08/16/2021 2038 178 (H) 70 - 130 mg/dL Final     Comment:     Meter: [id number] : [id number] stephane carter   08/16/2021 1633 212 (H) 70 - 130 mg/dL Final     Comment:     Meter: [id number] : [id number] AMEYA CALL   08/16/2021 1049 260 (H) 70 - 130 mg/dL Final     Comment:     Meter: [id number] : [id number] AMEYA CALL   08/16/2021 0638 188 (H) 70 - 130 mg/dL Final     Comment:     Meter: [id number] : [id number] AZIZA PERAZA   08/15/2021 2010 200 (H) 70 - 130 mg/dL Final     Comment:     Meter: [id number] : [id number] JOHN SMITH   08/15/2021 1659 121 70 - 130 mg/dL Final     Comment:     Meter: [id number]  : [id number] AMEYA CALL   08/15/2021 1105 190 (H) 70 - 130 mg/dL Final     Comment:     Meter: [id number] : [id number] AMEYA CALL     Lab Results   Component Value Date    HGBA1C 8.20 (H) 08/13/2021     Lab Results   Component Value Date    TSH 5.020 (H) 08/14/2021    FREET4 1.62 08/14/2021     No results found for: BLOODCX  No results found for: URINECX  No results found for: WOUNDCX  No results found for: STOOLCX  No results found for: RESPCX    Pain Management Panel     Pain Management Panel Latest Ref Rng & Units 5/29/2021    AMPHETAMINES SCREEN, URINE Negative Negative    BARBITURATES SCREEN Negative Negative    BENZODIAZEPINE SCREEN, URINE Negative Negative    BUPRENORPHINEUR Negative Negative    COCAINE SCREEN, URINE Negative Negative    METHADONE SCREEN, URINE Negative Negative        I have personally reviewed the above laboratory results for 08/17/21  ----------------------------------------------------------------------------------------------------------------------  Imaging Results (Last 24 Hours)     ** No results found for the last 24 hours. **        ----------------------------------------------------------------------------------------------------------------------  Assessment/Plan     Mesenteric lymphadenopathy status post resection approximately 8/12, by Dr. Elkins  Postoperative ileus  · Being managed by the primary team.   · Pathology report pending.   · Diet has been advanced and tolerating well so far.   · Patient has had 2 BMs postop with some bright red blood present.     Postoperative anemia  · Iron studies consistent with combination of AOCD and PAULINE.   · Hemoglobin down to 6.3 this AM, 1 unit PRBCs currently being transfused.   · Will hold DVT dose subq heparin and add SCDs for DVT prophylaxis for now.   · Hold home aspirin as patient denies any known history of CAD, PAD, and CVA.   · Transfuse for hemoglobin <7 or symptomatic anemia. Denies dizziness, shortness of breath, chest  pains.    Type II DM, insulin requiring, uncontrolled with hyperglycemia   · Hemoglobin A1c 8.2%. DM ed consulted.   · Glucose 166-260 last 24 hrs.   · Continue SSI 0-14U.   · Continue to monitor glucose checks, may consider restarting long acting insulin at a lower dose than what she was taking at home as her PO intake is increasing.     Multiple electrolyte abnormalities  Hypomagnesemia   Hypophosphatemia   Hyperkalemia   Hyponatremia   · Potassium and sodium normalized.   · Replace mag and phos PRN per protocol.     CKD stage II/IIIa  · Baseline creatinine 0.9-1.0.  · Creatinine remains at baseline.   · BUN elevated, improving. May be related to postop GI blood loss.   · BMP in AM.     Chronic afib  · Rate controlled.   · Coreg dose reduced 2/2 hypotension.   · Timing on when to restart eliquis per surgery.    Chronic diastolic heart failure   Moderate MVR  Moderate to severe TVR  HLD  · Monitor for volume overload.   · BP improved, BUN declining, tolerating regular diet. Will discontinue IVF for now and monitor BP closely.     Essential hypertension   · Amlodipine held for now and dose of coreg decreased to 6.25mg BID with continued holding parameters.     Hx of COVID-19 infection in 6/21  · Now PCR negative.     DVT Prophylaxis  · Changed from subq heparin to SCDs given continued decline in hemoglobin requiring blood transfusion.     I have discussed the patient's assessment and plan with the patient, SAM Noel, and my attending physician, Dr. Kurtz.    Disposition:   · Per primary team.     Discharge needs:  • None at this time.     AQUILINO Gray  08/17/21  08:05 EDT

## 2021-08-17 NOTE — PROGRESS NOTES
Chief complaint: Mesenteric lymphadenopathy     Postoperative day 5: Exploratory laparotomy, small bowel resection     Patient doing well this morning.  Having bowel movements.  No bright blood in stools.  Tolerating soft diet.  Patient hemoglobin low but she is hemodynamically stable this morning tolerating transfusion of 1 unit.    Afebrile, vital signs stable  Clear to auscultation bilaterally  Abdomen soft, appropriately tender, incision clean dry and intact     81-year-old female postoperative day 5 following exploratory laparotomy with small bowel resection for large bulky mesenteric lymphadenopathy.  -Continue soft diet  -Ambulate  -Anticipate discharge in 24 to 48 hours  -Continue to replace electrolytes as indicated  -Trend hemoglobin daily

## 2021-08-17 NOTE — PROGRESS NOTES
[facility] Medicine Services  CROSS COVER NOTE    Contacted by SAM Reid stating of the patient's hemoglobin is 6.3 on a.m. labs, which has trended down from 11 preoperatively. She is asymptomatic; denies any dizziness, shortness of breath, or chest pain. /60.  RN states that the patient has been having dark, bloody stools,  which primary team is aware of.  I have ordered 1 unit of PRBC to be transfused.  We will obtain repeat H&H 2 hours after transfusion.    Marah Yo PA-C  Hospitalist Service -- [facility]   Pager: [phone]    08/17/21  06:01 EDT

## 2021-08-17 NOTE — PLAN OF CARE
Goal Outcome Evaluation:      Pt resting in chair. Has been up in chair majority of the shift. Very pleasant. Hasn't had any new complaints. Continuing to monitor.

## 2021-08-18 VITALS
WEIGHT: 197 LBS | RESPIRATION RATE: 18 BRPM | OXYGEN SATURATION: 98 % | HEART RATE: 70 BPM | DIASTOLIC BLOOD PRESSURE: 54 MMHG | BODY MASS INDEX: 30.92 KG/M2 | TEMPERATURE: 99 F | HEIGHT: 67 IN | SYSTOLIC BLOOD PRESSURE: 125 MMHG

## 2021-08-18 LAB
ANION GAP SERPL CALCULATED.3IONS-SCNC: 8.7 MMOL/L (ref 5–15)
BASOPHILS # BLD AUTO: 0 10*3/MM3 (ref 0–0.2)
BASOPHILS NFR BLD AUTO: 0 % (ref 0–1.5)
BH BB BLOOD EXPIRATION DATE: NORMAL
BH BB BLOOD TYPE BARCODE: 5100
BH BB DISPENSE STATUS: NORMAL
BH BB PRODUCT CODE: NORMAL
BH BB UNIT NUMBER: NORMAL
BUN SERPL-MCNC: 18 MG/DL (ref 8–23)
BUN/CREAT SERPL: 19.6 (ref 7–25)
CALCIUM SPEC-SCNC: 8.6 MG/DL (ref 8.6–10.5)
CHLORIDE SERPL-SCNC: 104 MMOL/L (ref 98–107)
CO2 SERPL-SCNC: 19.3 MMOL/L (ref 22–29)
CREAT SERPL-MCNC: 0.92 MG/DL (ref 0.57–1)
CROSSMATCH INTERPRETATION: NORMAL
DEPRECATED RDW RBC AUTO: 51.7 FL (ref 37–54)
EOSINOPHIL # BLD AUTO: 0 10*3/MM3 (ref 0–0.4)
EOSINOPHIL NFR BLD AUTO: 0 % (ref 0.3–6.2)
ERYTHROCYTE [DISTWIDTH] IN BLOOD BY AUTOMATED COUNT: 15.9 % (ref 12.3–15.4)
GFR SERPL CREATININE-BSD FRML MDRD: 58 ML/MIN/1.73
GLUCOSE BLDC GLUCOMTR-MCNC: 179 MG/DL (ref 70–130)
GLUCOSE BLDC GLUCOMTR-MCNC: 297 MG/DL (ref 70–130)
GLUCOSE SERPL-MCNC: 189 MG/DL (ref 65–99)
HCT VFR BLD AUTO: 24.5 % (ref 34–46.6)
HGB BLD-MCNC: 7.5 G/DL (ref 12–15.9)
IMM GRANULOCYTES # BLD AUTO: 0.03 10*3/MM3 (ref 0–0.05)
IMM GRANULOCYTES NFR BLD AUTO: 0.5 % (ref 0–0.5)
LYMPHOCYTES # BLD AUTO: 1.36 10*3/MM3 (ref 0.7–3.1)
LYMPHOCYTES NFR BLD AUTO: 20.5 % (ref 19.6–45.3)
MAGNESIUM SERPL-MCNC: 1.9 MG/DL (ref 1.6–2.4)
MCH RBC QN AUTO: 28.1 PG (ref 26.6–33)
MCHC RBC AUTO-ENTMCNC: 30.6 G/DL (ref 31.5–35.7)
MCV RBC AUTO: 91.8 FL (ref 79–97)
MONOCYTES # BLD AUTO: 0.68 10*3/MM3 (ref 0.1–0.9)
MONOCYTES NFR BLD AUTO: 10.2 % (ref 5–12)
NEUTROPHILS NFR BLD AUTO: 4.58 10*3/MM3 (ref 1.7–7)
NEUTROPHILS NFR BLD AUTO: 68.8 % (ref 42.7–76)
NRBC BLD AUTO-RTO: 0 /100 WBC (ref 0–0.2)
PLATELET # BLD AUTO: 173 10*3/MM3 (ref 140–450)
PMV BLD AUTO: 10.4 FL (ref 6–12)
POTASSIUM SERPL-SCNC: 3.7 MMOL/L (ref 3.5–5.2)
RBC # BLD AUTO: 2.67 10*6/MM3 (ref 3.77–5.28)
SODIUM SERPL-SCNC: 132 MMOL/L (ref 136–145)
UNIT  ABO: NORMAL
UNIT  RH: NORMAL
WBC # BLD AUTO: 6.65 10*3/MM3 (ref 3.4–10.8)

## 2021-08-18 PROCEDURE — 82962 GLUCOSE BLOOD TEST: CPT

## 2021-08-18 PROCEDURE — 25010000002 MAGNESIUM SULFATE IN D5W 1G/100ML (PREMIX) 1-5 GM/100ML-% SOLUTION: Performed by: PHYSICIAN ASSISTANT

## 2021-08-18 PROCEDURE — 99024 POSTOP FOLLOW-UP VISIT: CPT | Performed by: SURGERY

## 2021-08-18 PROCEDURE — 83735 ASSAY OF MAGNESIUM: CPT | Performed by: PHYSICIAN ASSISTANT

## 2021-08-18 PROCEDURE — 63710000001 INSULIN ASPART PER 5 UNITS: Performed by: SURGERY

## 2021-08-18 PROCEDURE — 85025 COMPLETE CBC W/AUTO DIFF WBC: CPT | Performed by: PHYSICIAN ASSISTANT

## 2021-08-18 PROCEDURE — 99232 SBSQ HOSP IP/OBS MODERATE 35: CPT | Performed by: NURSE PRACTITIONER

## 2021-08-18 PROCEDURE — 80048 BASIC METABOLIC PNL TOTAL CA: CPT | Performed by: PHYSICIAN ASSISTANT

## 2021-08-18 RX ORDER — MAGNESIUM SULFATE 1 G/100ML
1 INJECTION INTRAVENOUS ONCE
Status: COMPLETED | OUTPATIENT
Start: 2021-08-18 | End: 2021-08-18

## 2021-08-18 RX ORDER — IBUPROFEN 600 MG/1
600 TABLET ORAL EVERY 6 HOURS PRN
Qty: 30 TABLET | Refills: 0 | Status: SHIPPED | OUTPATIENT
Start: 2021-08-18 | End: 2021-08-18

## 2021-08-18 RX ORDER — HYDROCODONE BITARTRATE AND ACETAMINOPHEN 5; 325 MG/1; MG/1
1 TABLET ORAL EVERY 8 HOURS PRN
Qty: 8 TABLET | Refills: 0 | Status: SHIPPED | OUTPATIENT
Start: 2021-08-18

## 2021-08-18 RX ORDER — DOCUSATE SODIUM 100 MG/1
100 CAPSULE, LIQUID FILLED ORAL 2 TIMES DAILY PRN
Qty: 60 CAPSULE | Refills: 0 | Status: SHIPPED | OUTPATIENT
Start: 2021-08-18 | End: 2021-10-12 | Stop reason: SDUPTHER

## 2021-08-18 RX ORDER — ACETAMINOPHEN 325 MG/1
650 TABLET ORAL EVERY 4 HOURS PRN
Qty: 30 TABLET | Refills: 0 | Status: SHIPPED | OUTPATIENT
Start: 2021-08-18

## 2021-08-18 RX ADMIN — INSULIN ASPART 8 UNITS: 100 INJECTION, SOLUTION INTRAVENOUS; SUBCUTANEOUS at 11:23

## 2021-08-18 RX ADMIN — INSULIN ASPART 3 UNITS: 100 INJECTION, SOLUTION INTRAVENOUS; SUBCUTANEOUS at 08:34

## 2021-08-18 RX ADMIN — MAGNESIUM SULFATE 1 G: 1 INJECTION INTRAVENOUS at 08:51

## 2021-08-18 RX ADMIN — OXYBUTYNIN CHLORIDE 10 MG: 5 TABLET, EXTENDED RELEASE ORAL at 08:34

## 2021-08-18 RX ADMIN — CARVEDILOL 6.25 MG: 6.25 TABLET, FILM COATED ORAL at 08:35

## 2021-08-18 RX ADMIN — PANTOPRAZOLE SODIUM 40 MG: 40 TABLET, DELAYED RELEASE ORAL at 05:13

## 2021-08-18 NOTE — CASE MANAGEMENT/SOCIAL WORK
Discharge Planning Assessment   [facility]     Patient Name: Rachana Justin  MRN: [id number]  Today's Date: 8/18/2021    Admit Date: 8/12/2021      Discharge Plan     Row Name 08/18/21 1033       Plan    Final Discharge Disposition Code  01 - home or self-care    Final Note Pt is being discharged home today. No needs identified.        TORI Jarquin

## 2021-08-18 NOTE — DISCHARGE SUMMARY
Date of Discharge:  8/18/2021    Discharge Diagnosis: Mesenteric lymphadenopathy    Presenting Problem/History of Present Illness  Active Hospital Problems    Diagnosis  POA   • **Mesenteric lymphadenopathy [R59.0]  Unknown      Resolved Hospital Problems   No resolved problems to display.          Hospital Course  Patient is a 81 y.o. female presented with mesenteric lymphadenopathy.  She underwent diagnostic laparoscopy followed by open small bowel resection.  The patient postoperatively developed ileus and some anemia as well as electrolyte abnormalities that responded to blood transfusion and electrolyte replacement.  She developed resumption of bowel function and is ready to be discharged home after resumption of anticoagulation.    Procedures Performed    Procedure(s):  DIAGNOSTIC LAPAROSCOPY CONVERTED TO  OPEN @ 0918 WITH SMALL BOWEL RESECTION  -------------------       Consults:   Consults     No orders found from 7/14/2021 to 8/13/2021.            Vital Signs  Temp:  [97.6 °F (36.4 °C)-99 °F (37.2 °C)] 99 °F (37.2 °C)  Heart Rate:  [70-71] 70  Resp:  [14-18] 18  BP: (123-126)/(45-54) 125/54    Discharge Disposition  Home or Self Care    Discharge Medications     Discharge Medications      New Medications      Instructions Start Date   acetaminophen 325 MG tablet  Commonly known as: TYLENOL   650 mg, Oral, Every 4 Hours PRN      docusate sodium 100 MG capsule  Commonly known as: Colace   100 mg, Oral, 2 Times Daily PRN         Changes to Medications      Instructions Start Date   HYDROcodone-acetaminophen 5-325 MG per tablet  Commonly known as: NORCO  What changed: when to take this   1 tablet, Oral, Every 8 Hours PRN         Continue These Medications      Instructions Start Date   amLODIPine 5 MG tablet  Commonly known as: NORVASC   5 mg, Oral, Every 24 Hours Scheduled      apixaban 5 MG tablet tablet  Commonly known as: ELIQUIS   5 mg, Oral, 2 Times Daily, ON HOLD PER DR CEJA      aspirin 81 MG  chewable tablet   81 mg, Oral, Daily      atorvastatin 10 MG tablet  Commonly known as: LIPITOR   10 mg, Oral, Nightly      carvedilol 12.5 MG tablet  Commonly known as: COREG   12.5 mg, Oral, 2 Times Daily With Meals      fesoterodine fumarate 8 MG tablet sustained-release 24 hour tablet  Commonly known as: TOVIAZ ER   8 mg, Oral, Every 24 Hours Scheduled      furosemide 40 MG tablet  Commonly known as: LASIX   40 mg, Oral, 3 Times Weekly PRN      Insulin Glargine (1 Unit Dial) 300 UNIT/ML solution pen-injector injection  Commonly known as: TOUJEO   50 Units, Subcutaneous, Daily      losartan 50 MG tablet  Commonly known as: COZAAR   50 mg, Oral, Daily      omeprazole 40 MG capsule  Commonly known as: priLOSEC   40 mg, Oral, Daily PRN      potassium chloride ER 20 MEQ tablet controlled-release ER tablet  Commonly known as: K-TAB   20 mEq, Oral, 3 Times Weekly, Prior to [facility] Admission, Patient was on: only takes when she takes Furosemide      SITagliptin 100 MG tablet  Commonly known as: JANUVIA   100 mg, Oral, Daily             Discharge Diet:   Diet Instructions     Advance Diet as Tolerated         Consistent carbohydrate diet as tolerated.            Activity at Discharge:   Activity Instructions     Discharge activity      No lifting over 10 lbs for 4 weeks  No driving while on narcotics            Follow-up Appointments  Future Appointments   Date Time Provider Department Center   9/1/2021 10:00 AM Troy Elkins MD [facility]     Additional Instructions for the Follow-ups that You Need to Schedule     Discharge Follow-up with Specialty: 1 Week   As directed      Follow Up: 1 Week               Test Results Pending at Discharge  Pending Labs     Order Current Status    Tissue Pathology Exam In process           Troy Elkins MD  08/18/21  16:47 EDT    Time: Discharge 15 min

## 2021-08-18 NOTE — PROGRESS NOTES
Patient Identification:  Name:  Rachana Justin  Age:  81 y.o.  Sex:  female  :  1940  MRN:  [id number]  Visit Number:  [id number]  Primary Care Provider:  Shabnam De La Rosa MD    Length of stay:  6    Chief Complaint: f/u blood sugar, potassium     Mrs. Justin is a 81 year old female patient who was admitted to [facility] on 21 by general surgery to undergo a diagnostic lap secondary to a large mesenteric lymphadenopathy. The hospitalist were consulted for assistance in her uncontrolled DM Type 2, hyponatremia and hyperkalemia.     Subjective:      Mrs. Justin on my exam today is doing well, her daughter is at bedside. She denies any abdominal pain, no nausea, vomiting or diarrhea. She denies any bloody stools. She is sitting up on the side of the bed in no distress, discussed with SAM Dent.  ----------------------------------------------------------------------------------------------------------------------  Current Hospital Meds:  atorvastatin, 10 mg, Oral, Nightly  carvedilol, 6.25 mg, Oral, BID With Meals  insulin aspart, 0-14 Units, Subcutaneous, TID AC  oxybutynin XL, 10 mg, Oral, Daily  pantoprazole, 40 mg, Oral, Q AM         ----------------------------------------------------------------------------------------------------------------------  Vital Signs:  Temp:  [97.6 °F (36.4 °C)-99 °F (37.2 °C)] 99 °F (37.2 °C)  Heart Rate:  [66-71] 70  Resp:  [14-18] 18  BP: (123-132)/(45-63) 125/54      21  0500 21  0500 21  0514   Weight: 89.4 kg (197 lb) 89.7 kg (197 lb 12.8 oz) 89.4 kg (197 lb)     Body mass index is 30.85 kg/m².    Intake/Output Summary (Last 24 hours) at 2021 1013  Last data filed at 2021 0514  Gross per 24 hour   Intake 2887.92 ml   Output --   Net 2887.92 ml     No intake/output data recorded.  Diet Regular; GI Soft  ----------------------------------------------------------------------------------------------------------------------  Physical  exam:  Constitutional:  Well-developed and well-nourished.  No respiratory distress.      HENT:  Head:  Normocephalic and atraumatic.  Mouth:  Moist mucous membranes.     Neck:  Neck supple.  No JVD present.    Cardiovascular:  Normal rate, regular rhythm and normal heart sounds with no murmur.  Pulmonary/Chest:  No respiratory distress, no wheezes, no crackles, with normal breath sounds and good air movement.  Abdominal:  Soft.  Bowel sounds are normal. Slightly tender abdomen post op site.  Musculoskeletal:  No edema, no tenderness, and no deformity.  No red or swollen joints anywhere.    Neurological:  Alert and oriented to person, place, and time.   No tongue deviation.  No facial droop.  No slurred speech.   Skin:  Skin is warm and dry. No rash noted. No pallor.   ----------------------------------------------------------------------------------------------------------------------  ----------------------------------------------------------------------------------------------------------------------      Results from last 7 days   Lab Units 08/18/21  0049 08/17/21  1359 08/17/21  0526 08/16/21  0213 08/16/21  0054   WBC 10*3/mm3 6.65  --  6.17  --  7.11   HEMOGLOBIN g/dL 7.5* 7.9* 6.3*   < > 6.8*   HEMATOCRIT % 24.5* 26.1* 21.3*   < > 21.8*   MCV fL 91.8  --  94.7  --  91.6   MCHC g/dL 30.6*  --  29.6*  --  31.2*   PLATELETS 10*3/mm3 173  --  151  --  158    < > = values in this interval not displayed.         Results from last 7 days   Lab Units 08/18/21  0049 08/17/21  0526 08/16/21  0054 08/15/21  0040 08/14/21  0617   SODIUM mmol/L 132* 135* 138   < > 131*   POTASSIUM mmol/L 3.7 3.7 4.1   < > 4.7   MAGNESIUM mg/dL 1.9 1.7 1.9   < >  --    CHLORIDE mmol/L 104 108* 107   < > 102   CO2 mmol/L 19.3* 17.7* 22.7   < > 23.4   BUN mg/dL 18 24* 39*   < > 38*   CREATININE mg/dL 0.92 0.85 0.90   < > 0.86   EGFR IF NONAFRICN AM mL/min/1.73 58* 64 60*   < > 63   CALCIUM mg/dL 8.6 8.1* 8.6   < > 9.2   GLUCOSE mg/dL 189*  166* 164*   < > 207*   ALBUMIN g/dL  --   --   --   --  2.96*   BILIRUBIN mg/dL  --   --   --   --  0.6   ALK PHOS U/L  --   --   --   --  98   AST (SGOT) U/L  --   --   --   --  11   ALT (SGPT) U/L  --   --   --   --  7    < > = values in this interval not displayed.   Estimated Creatinine Clearance: 53.2 mL/min (by C-G formula based on SCr of 0.92 mg/dL).  No results found for: AMMONIA      No results found for: BLOODCX  No results found for: URINECX  No results found for: WOUNDCX  No results found for: STOOLCX  ----------------------------------------------------------------------------------------------------------------------  Imaging Results (Last 24 Hours)     ** No results found for the last 24 hours. **        ----------------------------------------------------------------------------------------------------------------------  Assessment and Plan:    Mesenteric lymphadenopathy status post resection approximately 8/12, by Dr. Elkins  Postoperative ileus  -Being managed by the primary team.   -Pathology report pending.  -Managed by primary      Postoperative anemia  -Iron studies consistent with combination of AOCD and PAULINE.   -improved after transfusion.      Type II DM, insulin requiring, uncontrolled with hyperglycemia   -Hemoglobin A1c 8.2%. DM ed consulted.  -patient discharged today with home regimen resumed by primary, discussed with her monitoring her blood sugar at home her home insulin and how well she is eating, she v/u  -she reports having everything she needs to monitor her glucose.      Multiple electrolyte abnormalities  Hypomagnesemia   Hypophosphatemia   Hyperkalemia   Hyponatremia   -improved/resolved    CKD stage II/IIIa  -Baseline creatinine 0.9-1.0.  -creatinine 0.92     Chronic afib  -Rate controlled.   -home eliquis was resumed at discharge today by primary      Chronic diastolic heart failure   Moderate MVR  Moderate to severe TVR  HLD   -Monitor for volume overload.   -appears  euvolemic today     Essential hypertension   -/54, HR 70     Hx of COVID-19 infection in 6/21  · Now PCR negative.     DVT Prophylaxis: ANNIE Rascon  08/18/21  10:13 EDT

## 2021-08-18 NOTE — PLAN OF CARE
Goal Outcome Evaluation:              Outcome Summary: Patient has rested in bed this shift, No acute changes, VSS, Will continue to monitor.

## 2021-08-19 ENCOUNTER — READMISSION MANAGEMENT (OUTPATIENT)
Dept: CALL CENTER | Facility: HOSPITAL | Age: 81
End: 2021-08-19

## 2021-08-19 NOTE — OUTREACH NOTE
Prep Survey      Responses   [facility] facility patient discharged from?  [facility]   Is LACE score < 7 ?  No   Emergency Room discharge w/ pulse ox?  No   Eligibility  Readm Mgmt   Discharge diagnosis  **Mesenteric lymphadenopathy    Does the patient have one of the following disease processes/diagnoses(primary or secondary)?  Other   Does the patient have Home health ordered?  No   Is there a DME ordered?  No   Prep survey completed?  Yes          Michela Flanagan RN

## 2021-08-20 ENCOUNTER — READMISSION MANAGEMENT (OUTPATIENT)
Dept: CALL CENTER | Facility: HOSPITAL | Age: 81
End: 2021-08-20

## 2021-08-20 NOTE — OUTREACH NOTE
Medical Week 1 Survey      Responses   [facility] patient discharged from?  [facility]   Does the patient have one of the following disease processes/diagnoses(primary or secondary)?  Other   Week 1 attempt successful?  No   Unsuccessful attempts  Attempt 1          Monserrat Ortez RN

## 2021-08-23 ENCOUNTER — READMISSION MANAGEMENT (OUTPATIENT)
Dept: CALL CENTER | Facility: HOSPITAL | Age: 81
End: 2021-08-23

## 2021-09-21 ENCOUNTER — OFFICE VISIT (OUTPATIENT)
Dept: SURGERY | Facility: CLINIC | Age: 81
End: 2021-09-21

## 2021-09-21 VITALS — HEIGHT: 67 IN | WEIGHT: 197 LBS | BODY MASS INDEX: 30.92 KG/M2

## 2021-09-21 DIAGNOSIS — C85.93 LYMPHOMA OF INTRA-ABDOMINAL LYMPH NODES, UNSPECIFIED LYMPHOMA TYPE (HCC): Primary | ICD-10-CM

## 2021-09-21 PROCEDURE — 99024 POSTOP FOLLOW-UP VISIT: CPT | Performed by: SURGERY

## 2021-09-21 NOTE — PROGRESS NOTES
Subjective   Rachana Justin is a 81 y.o. female  is here today for follow-up.         Rachana Justin is a 81 y.o. female here for follow up after recent laparotomy with small bowel resection and removal of enlarged mesenteric nodes with pathology revealing grade 2 follicular lymphoma.  All enlarged nodes cannot be removed but the 2 PET avid nodes were removed and the patient has done well.  Her incisions healing without issue and she is moving her bowels nicely.                   Assessment     Diagnoses and all orders for this visit:    1. Lymphoma of intra-abdominal lymph nodes, unspecified lymphoma type (CMS/HCC) (Primary)      Rachana Justin is a 81 y.o. female status post small bowel resection for multiple mesenteric nodes that were enlarged and PET avid.  Final pathology is consistent with grade 2 follicular lymphoma.  All PET avid regions were resected the time of surgery but multiple enlarged palpable nodes were evident at the time of surgery.  She will be referred for hematology oncology evaluation for further management and treatment.

## 2021-10-01 LAB
LAB AP CASE REPORT: NORMAL
LAB AP CLINICAL INFORMATION: NORMAL

## 2021-10-06 ENCOUNTER — TELEPHONE (OUTPATIENT)
Dept: ONCOLOGY | Facility: CLINIC | Age: 81
End: 2021-10-06

## 2021-10-06 ENCOUNTER — CONSULT (OUTPATIENT)
Dept: ONCOLOGY | Facility: CLINIC | Age: 81
End: 2021-10-06

## 2021-10-06 VITALS
OXYGEN SATURATION: 96 % | HEIGHT: 66 IN | BODY MASS INDEX: 32.88 KG/M2 | RESPIRATION RATE: 18 BRPM | HEART RATE: 70 BPM | TEMPERATURE: 97.5 F | DIASTOLIC BLOOD PRESSURE: 66 MMHG | SYSTOLIC BLOOD PRESSURE: 114 MMHG | WEIGHT: 204.6 LBS

## 2021-10-06 DIAGNOSIS — C82.13 FOLLICULAR LYMPHOMA GRADE II OF INTRA-ABDOMINAL LYMPH NODES (HCC): Primary | ICD-10-CM

## 2021-10-06 PROBLEM — C82.90 FOLLICULAR LYMPHOMA: Status: ACTIVE | Noted: 2021-09-21

## 2021-10-06 LAB
ALBUMIN SERPL-MCNC: 3.94 G/DL (ref 3.5–5.2)
ALBUMIN/GLOB SERPL: 1.5 G/DL
ALP SERPL-CCNC: 185 U/L (ref 39–117)
ALT SERPL W P-5'-P-CCNC: 11 U/L (ref 1–33)
ANION GAP SERPL CALCULATED.3IONS-SCNC: 9.5 MMOL/L (ref 5–15)
AST SERPL-CCNC: 17 U/L (ref 1–32)
BASOPHILS # BLD AUTO: 0.03 10*3/MM3 (ref 0–0.2)
BASOPHILS NFR BLD AUTO: 0.6 % (ref 0–1.5)
BILIRUB SERPL-MCNC: 0.7 MG/DL (ref 0–1.2)
BUN SERPL-MCNC: 18 MG/DL (ref 8–23)
BUN/CREAT SERPL: 18.2 (ref 7–25)
CALCIUM SPEC-SCNC: 9.6 MG/DL (ref 8.6–10.5)
CHLORIDE SERPL-SCNC: 101 MMOL/L (ref 98–107)
CO2 SERPL-SCNC: 21.5 MMOL/L (ref 22–29)
CREAT SERPL-MCNC: 0.99 MG/DL (ref 0.57–1)
DEPRECATED RDW RBC AUTO: 48 FL (ref 37–54)
EOSINOPHIL # BLD AUTO: 0.1 10*3/MM3 (ref 0–0.4)
EOSINOPHIL NFR BLD AUTO: 2.1 % (ref 0.3–6.2)
ERYTHROCYTE [DISTWIDTH] IN BLOOD BY AUTOMATED COUNT: 14.9 % (ref 12.3–15.4)
GFR SERPL CREATININE-BSD FRML MDRD: 54 ML/MIN/1.73
GLOBULIN UR ELPH-MCNC: 2.6 GM/DL
GLUCOSE SERPL-MCNC: 244 MG/DL (ref 65–99)
HCT VFR BLD AUTO: 29.8 % (ref 34–46.6)
HGB BLD-MCNC: 8.7 G/DL (ref 12–15.9)
IMM GRANULOCYTES # BLD AUTO: 0.01 10*3/MM3 (ref 0–0.05)
IMM GRANULOCYTES NFR BLD AUTO: 0.2 % (ref 0–0.5)
LYMPHOCYTES # BLD AUTO: 0.98 10*3/MM3 (ref 0.7–3.1)
LYMPHOCYTES NFR BLD AUTO: 20.2 % (ref 19.6–45.3)
MCH RBC QN AUTO: 25.4 PG (ref 26.6–33)
MCHC RBC AUTO-ENTMCNC: 29.2 G/DL (ref 31.5–35.7)
MCV RBC AUTO: 86.9 FL (ref 79–97)
MONOCYTES # BLD AUTO: 0.54 10*3/MM3 (ref 0.1–0.9)
MONOCYTES NFR BLD AUTO: 11.1 % (ref 5–12)
NEUTROPHILS NFR BLD AUTO: 3.19 10*3/MM3 (ref 1.7–7)
NEUTROPHILS NFR BLD AUTO: 65.8 % (ref 42.7–76)
NRBC BLD AUTO-RTO: 0 /100 WBC (ref 0–0.2)
PLATELET # BLD AUTO: 184 10*3/MM3 (ref 140–450)
PMV BLD AUTO: 10.1 FL (ref 6–12)
POTASSIUM SERPL-SCNC: 4.1 MMOL/L (ref 3.5–5.2)
PROT SERPL-MCNC: 6.5 G/DL (ref 6–8.5)
RBC # BLD AUTO: 3.43 10*6/MM3 (ref 3.77–5.28)
SODIUM SERPL-SCNC: 132 MMOL/L (ref 136–145)
WBC # BLD AUTO: 4.85 10*3/MM3 (ref 3.4–10.8)

## 2021-10-06 PROCEDURE — 85025 COMPLETE CBC W/AUTO DIFF WBC: CPT | Performed by: INTERNAL MEDICINE

## 2021-10-06 PROCEDURE — 80053 COMPREHEN METABOLIC PANEL: CPT | Performed by: INTERNAL MEDICINE

## 2021-10-06 PROCEDURE — 99205 OFFICE O/P NEW HI 60 MIN: CPT | Performed by: INTERNAL MEDICINE

## 2021-10-06 RX ORDER — FERROUS SULFATE 325(65) MG
325 TABLET ORAL 2 TIMES DAILY
Qty: 60 TABLET | Refills: 2 | Status: SHIPPED | OUTPATIENT
Start: 2021-10-06

## 2021-10-06 NOTE — PROGRESS NOTES
"  Name:  Rachana Justin  :  1940  Date:  10/6/2021     REFERRING PHYSICIAN  Troy Elkins MD    PRIMARY CARE PHYSICIAN  Shabnam De La Rosa MD    REASON FOR CONSULTATION  1. Follicular lymphoma grade II of intra-abdominal lymph nodes (HCC)      CHIEF COMPLAINT  Improved, but persistent, fatigue since surgery in mid-August.    Dear Troy,    HISTORY OF PRESENT ILLNESS:   Thank you for referring Ms. Justin to our hematology/oncology clinic. As you are aware, she is a pleasant, 81 y.o., white female with a history of hypertension, diabetes and COPD who presented to our ED on 2021 for abdominal pain. CT scans identified a right lower quadrant, mesenteric mass. Follow up was arranged with you in your clinic. A NM PET scan on 2021 showed that, in the interim, this mass had \"resolved\" while one in the left lower abdomen had \"developed\" (which likely represented the same lymph node(s) changing position, due to mobility of the mesentery. You ultimately admitted took her to the OR on 2021 for a diagnostic laparoscopy (which had to be converted to an open laparotomy due to the location of the lymphadenopathy at the mesenteric root). A couple of lymph nodes and a small segment of the proximal ileum were successfully excised, and the final results are consistent with grade 2 follicular lymphoma. Though she developed a postoperative ileus and anemia, she was able to be discharged on 2021. She is now referred to our clinic for further evaluation and management.    INTERIM HISTORY:  Ms. Justin presents to clinic today for initial consultation accompanied by her daughter. They confirm the above history. She states that she overall tolerated her surgery well. Her bowel movements are moving normally now. Her only complaint today is of some mildly improved, but still persistent, fatigue (since her surgery). She denies B-symptoms.    Past Medical History:   Diagnosis Date   • Ambulates with cane    • " Arthritis    • Atrial fibrillation (HCC)    • COPD (chronic obstructive pulmonary disease) (HCC)    • COVID-19 virus infection 2020   • Diabetes mellitus (HCC)    • Gallstones    • GERD (gastroesophageal reflux disease)    • Heart disease    • Hyperlipidemia    • Hypertension    • Mesenteric lymphadenopathy 2021    Added automatically from request for surgery [id number]   • Pulmonary hypertension (HCC)    • Status post laparoscopic cholecystectomy 3/9/2021       Past Surgical History:   Procedure Laterality Date   • CATARACT EXTRACTION Bilateral    • CHOLECYSTECTOMY N/A 3/26/2021    Procedure: CHOLECYSTECTOMY LAPAROSCOPIC (REQUESTS MARIVEL NEWMAN);  Surgeon: Keagan Lopez MD;  Location: [facility] OR;  Service: General;  Laterality: N/A;   • COLONOSCOPY N/A 2021    Procedure: COLONOSCOPY;  Surgeon: Troy Elkins MD;  Location: [facility] OR;  Service: Gastroenterology;  Laterality: N/A;   • DIAGNOSTIC LAPAROSCOPY N/A 2021    Procedure: DIAGNOSTIC LAPAROSCOPY CONVERTED TO  OPEN @ 0918 WITH SMALL BOWEL RESECTION;  Surgeon: Troy Elkins MD;  Location: [facility] OR;  Service: General;  Laterality: N/A;   • ENDOSCOPY N/A 2021    Procedure: ESOPHAGOGASTRODUODENOSCOPY WITH ANESTHESIA;  Surgeon: Troy Elkins MD;  Location: [facility] OR;  Service: Gastroenterology;  Laterality: N/A;   • FRACTURE SURGERY      right foot orif   • PACEMAKER IMPLANTATION Left 2018       Social History     Socioeconomic History   • Marital status:      Spouse name: Not on file   • Number of children: Not on file   • Years of education: Not on file   • Highest education level: Not on file   Tobacco Use   • Smoking status: Former Smoker     Packs/day: 0.50     Years: 10.00     Pack years: 5.00     Types: Cigarettes     Quit date: 3/1/1963     Years since quittin.6   • Smokeless tobacco: Never Used   Vaping Use   • Vaping Use: Never used   Substance and Sexual Activity   • Alcohol use: No   • Drug  use: Never   • Sexual activity: Defer       Family History   Problem Relation Age of Onset   • Breast cancer Sister    • Breast cancer Sister    • Breast cancer Sister         40's   • Cancer Mother        No Known Allergies    Current Outpatient Medications   Medication Sig Dispense Refill   • apixaban (ELIQUIS) 5 MG tablet tablet Take 5 mg by mouth 2 (Two) Times a Day. ON HOLD PER DR CEJA     • atorvastatin (LIPITOR) 10 MG tablet Take 1 tablet by mouth Every Night. 30 tablet 3   • carvedilol (COREG) 12.5 MG tablet Take 12.5 mg by mouth 2 (Two) Times a Day With Meals.     • furosemide (LASIX) 40 MG tablet Take 40 mg by mouth 3 (Three) Times a Week if Needed (FLUID RETENTION).     • Insulin Glargine, 1 Unit Dial, (TOUJEO) 300 UNIT/ML solution pen-injector injection Inject 50 Units under the skin into the appropriate area as directed Daily.     • losartan (COZAAR) 50 MG tablet Take 50 mg by mouth Daily.     • potassium chloride ER (K-TAB) 20 MEQ tablet controlled-release ER tablet Take 20 mEq by mouth 3 (Three) Times a Week. Prior to [facility] Admission, Patient was on: only takes when she takes Furosemide     • SITagliptin (JANUVIA) 100 MG tablet Take 100 mg by mouth Daily.     • acetaminophen (TYLENOL) 325 MG tablet Take 2 tablets by mouth Every 4 (Four) Hours As Needed for Mild Pain . 30 tablet 0   • amLODIPine (NORVASC) 5 MG tablet Take 1 tablet by mouth Daily. 30 tablet 3   • aspirin 81 MG chewable tablet Chew 81 mg Daily.     • docusate sodium (Colace) 100 MG capsule Take 1 capsule by mouth 2 (Two) Times a Day As Needed for Constipation. 60 capsule 0   • ferrous sulfate 325 (65 FE) MG tablet Take 1 tablet by mouth 2 (two) times a day. 60 tablet 2   • fesoterodine fumarate (TOVIAZ ER) 8 MG tablet sustained-release 24 hour tablet Take 8 mg by mouth Daily.     • HYDROcodone-acetaminophen (NORCO) 5-325 MG per tablet Take 1 tablet by mouth Every 8 (Eight) Hours As Needed for Severe Pain . 8 tablet 0   • omeprazole  "(priLOSEC) 40 MG capsule Take 40 mg by mouth Daily As Needed (stomach).       No current facility-administered medications for this visit.     REVIEW OF SYSTEMS  CONSTITUTIONAL:  No fever, chills or night sweats. Slightly improved, but persistent, fatigue since her laparotomy in mid-August.  EYES:  No blurry vision, diplopia or other vision changes.  ENT:  No hearing loss, nosebleeds or sore throat.  CARDIOVASCULAR:  No palpitations, arrhythmia, syncopal episodes or edema.  PULMONARY:  No hemoptysis, wheezing, chronic cough or shortness of breath.  GASTROINTESTINAL:  No nausea or vomiting. No constipation or diarrhea. No abdominal pain.  GENITOURINARY:  No hematuria, kidney stones or frequent urination.  MUSCULOSKELETAL:  No joint or back pains.  INTEGUMENTARY: No rashes or pruritus.  ENDOCRINE:  No excessive thirst or hot flashes.  HEMATOLOGIC:  No history of free bleeding, spontaneous bleeding or clotting.  IMMUNOLOGIC:  No allergies or frequent infections.  NEUROLOGIC: No numbness, tingling, seizures or weakness.  PSYCHIATRIC:  No anxiety or depression.    PHYSICAL EXAMINATION  /66   Pulse 70   Temp 97.5 °F (36.4 °C) (Temporal)   Resp 18   Ht 167.6 cm (66\")   Wt 92.8 kg (204 lb 9.6 oz)   SpO2 96%   BMI 33.02 kg/m²     Pain Score:  Pain Score    10/06/21 1003   PainSc: 0-No pain     PHQ-Score Total:  PHQ-9 Total Score: 4    GENERAL:  A well-developed, well-nourished, elderly, white female in no acute distress.  HEENT:  Pupils equally round and reactive to light. Extraocular muscles intact.  CARDIOVASCULAR:  Regular rate and rhythm. No murmurs, gallops or rubs.  LUNGS:  Clear to auscultation bilaterally.  ABDOMEN:  Soft, nontender, nondistended with positive bowel sounds. Well-healing laparotomy scars.  EXTREMITIES:  No clubbing, cyanosis or edema bilaterally.  SKIN:  No rashes or petechiae.  NEURO:  Cranial nerves grossly intact. No focal deficits.  PSYCH:  Alert and oriented x3.    LABORATORY  Lab " Results   Component Value Date    WBC 4.85 10/06/2021    HGB 8.7 (L) 10/06/2021    HCT 29.8 (L) 10/06/2021    MCV 86.9 10/06/2021     10/06/2021    NEUTROABS 3.19 10/06/2021       Lab Results   Component Value Date     (L) 10/06/2021    K 4.1 10/06/2021     10/06/2021    CO2 21.5 (L) 10/06/2021    BUN 18 10/06/2021    CREATININE 0.99 10/06/2021    GLUCOSE 244 (H) 10/06/2021    CALCIUM 9.6 10/06/2021    AST 17 10/06/2021    ALT 11 10/06/2021    ALKPHOS 185 (H) 10/06/2021    BILITOT 0.7 10/06/2021    PROTEINTOT 6.5 10/06/2021    ALBUMIN 3.94 10/06/2021     CBC (10/06/2021): WBCs: 4.85; HgB: 8.7; Hct: 29.8; platelets: 184  CBC (08/18/2021): WBCs: 6.65; HgB: 7.5; Hct: 24.5; platelets: 173    IMAGING  CT abdomen and pelvis without contrast (05/29/2021):  Impression:  1) Adenopathy in the right lower quadrant mesentery concerning for malignancy. There is some adjacent inflammatory fat stranding with one of the nodes. Consider PET/CT for followup.  2) Mild thickening of the rectosigmoid colon could be due to incomplete distention or mild segmental colitis. There is colonic diverticulosis without focal diverticulitis.  3) Grossly normal small bowel and appendix.  4) Cholecystectomy with some mild fat stranding in the gallbladder fossa. This could be due to relatively recent surgery or some mild inflammation. No fluid collection.  5) Additional findings [are nonacute].    NM PET with fusion CT, skull base to mid-thigh (06/14/2021, compared to CT of the abdomen/pelvis dated 05/29/2021):  Impression:  1) Significant interval improvement in the appearance of the large right lower quadrant soft tissue masses. Residual nodules are present but they do not show FDG avidity.  2) Interval development of a large, soft tissue nodule in the left lower quadrant which does show hypermetabolic activity.    PATHOLOGY  Lymph node, small bowel mesenteric #1 (08/12/2021):  Follicular lymphoma, grade 2.    Lymph node, small  bowel mesenteric #2 (08/12/2021):  Follicular lymphoma, grade 2.    Small bowel segment, proximal ileum (08/12/2021):  Follicular lymphoma, grade 2. Small bowel also involved by follicular lymphoma. Viable surgical margins.    IMPRESSION AND PLAN  Ms. Justin is an 81 y.o., white female with:  1. Follicular lymphoma: Diagnosed in midsummer 2021 with, so far as is currently known, stage IV (a solid organ, the small bowel, was involved), but now fully (or at least mostly) resected disease. I had a long discussion with the patient and her daughter in clinic today regarding this diagnosis and, in general terms, its prognosis. We discussed how, even if there is some residual, nonbulky disease still present status post mid-August's diagnostic/therapeutic laparotomy, expectant monitoring is often recommended as a reasonable, initial management approach for indolent lymphoma, especially in an elderly person. Therefore, the best, first step in our evaluation will be to obtain new imaging to assess the current status of her disease. We will see her back in three weeks with CTs of the neck, chest, abdomen and pelvis with contrast. At that time, based on these results, we will finalize our treatment and/or follow up recommendations. The patient and her daughter were in agreement with these plans.  2. Anemia: Improved today (8.7 g/dL) compared to a couple of months ago (7.5 g/dL on 08/18/2021), but only slowly improving. Ongoing iron deficiency status post surgery in mid-August 2021 is likely still playing a large role. A Rx for ferrous sulfate 325 mg PO BID was provided today. We will repeat labs at her follow up appointment in three weeks.  The patient and her daughter were in agreement with these plans.    It is a pleasure to participate in Ms. Justin's care. Please do not hesitate to call with any questions or concerns that you may have.    A total of 60 minutes were spent coordinating this patient’s care in clinic today; more  than 50% of this time was face-to-face with the patient and her daughter, reviewing her medical history, discussing the diagnosis and, in general terms, its prognosis and counseling on the current evaluation, potential treatment options and followup plan. All questions were answered to their satisfaction.    FOLLOW UP  With surgery, as previously planned. Return to our clinic in ~3 weeks with a CBC, CMP, iron panel, B12, folic acid and CTs of the neck, chest, abdomen and pelvis with contrast.            This document was electronically signed by MYAH Vinson MD October 6, 2021 14:34 EDT      CC: MD Shabnam Cline MD

## 2021-10-12 ENCOUNTER — OFFICE VISIT (OUTPATIENT)
Dept: SURGERY | Facility: CLINIC | Age: 81
End: 2021-10-12

## 2021-10-12 VITALS — BODY MASS INDEX: 32.78 KG/M2 | HEIGHT: 66 IN | WEIGHT: 204 LBS

## 2021-10-12 DIAGNOSIS — C82.13 FOLLICULAR LYMPHOMA GRADE II OF INTRA-ABDOMINAL LYMPH NODES (HCC): Primary | ICD-10-CM

## 2021-10-12 PROCEDURE — 99024 POSTOP FOLLOW-UP VISIT: CPT | Performed by: SURGERY

## 2021-10-12 RX ORDER — DOCUSATE SODIUM 100 MG/1
100 CAPSULE, LIQUID FILLED ORAL 2 TIMES DAILY PRN
Qty: 60 CAPSULE | Refills: 0 | Status: SHIPPED | OUTPATIENT
Start: 2021-10-12

## 2021-10-12 NOTE — PROGRESS NOTES
Subjective   Rachana Justin is a 81 y.o. female  is here today for follow-up.         Rachana Justin is a 81 y.o. female here for follow up after recent laparotomy with small bowel resection and removal of enlarged mesenteric nodes with pathology revealing grade 2 follicular lymphoma.  All enlarged nodes cannot be removed but the 2 PET avid nodes were removed and the patient has done well.  Her incisions healing without issue and she is moving her bowels nicely.                   Assessment     There are no diagnoses linked to this encounter.  Racahna Justin is a 81 y.o. female status post small bowel resection for multiple mesenteric nodes that were enlarged and PET avid.  Final pathology is consistent with grade 2 follicular lymphoma.  All PET avid regions were resected the time of surgery but multiple enlarged palpable nodes were evident at the time of surgery.  She will be referred for hematology oncology evaluation for further management and treatment which is currently proceeding with close surveillance as the two large PET avid areas have been removed.

## 2021-10-22 ENCOUNTER — HOSPITAL ENCOUNTER (OUTPATIENT)
Dept: CT IMAGING | Facility: HOSPITAL | Age: 81
Discharge: HOME | End: 2021-10-22

## 2021-10-22 DIAGNOSIS — C82.13 FOLLICULAR LYMPHOMA GRADE II OF INTRA-ABDOMINAL LYMPH NODES (HCC): ICD-10-CM

## 2021-10-22 PROCEDURE — 74177 CT ABD & PELVIS W/CONTRAST: CPT | Performed by: RADIOLOGY

## 2021-10-22 PROCEDURE — 71260 CT THORAX DX C+: CPT

## 2021-10-22 PROCEDURE — 71260 CT THORAX DX C+: CPT | Performed by: RADIOLOGY

## 2021-10-22 PROCEDURE — 70491 CT SOFT TISSUE NECK W/DYE: CPT

## 2021-10-22 PROCEDURE — 74177 CT ABD & PELVIS W/CONTRAST: CPT

## 2021-10-22 PROCEDURE — 25010000002 IOPAMIDOL 61 % SOLUTION: Performed by: INTERNAL MEDICINE

## 2021-10-22 PROCEDURE — 70491 CT SOFT TISSUE NECK W/DYE: CPT | Performed by: RADIOLOGY

## 2021-10-22 RX ADMIN — IOPAMIDOL 90 ML: 612 INJECTION, SOLUTION INTRAVENOUS at 08:57

## 2021-10-27 ENCOUNTER — LAB (OUTPATIENT)
Dept: ONCOLOGY | Facility: CLINIC | Age: 81
End: 2021-10-27

## 2021-10-27 ENCOUNTER — OFFICE VISIT (OUTPATIENT)
Dept: ONCOLOGY | Facility: CLINIC | Age: 81
End: 2021-10-27

## 2021-10-27 VITALS
WEIGHT: 205.4 LBS | RESPIRATION RATE: 18 BRPM | DIASTOLIC BLOOD PRESSURE: 68 MMHG | SYSTOLIC BLOOD PRESSURE: 135 MMHG | OXYGEN SATURATION: 97 % | BODY MASS INDEX: 33.17 KG/M2 | HEART RATE: 71 BPM | TEMPERATURE: 97.3 F

## 2021-10-27 DIAGNOSIS — C82.13 FOLLICULAR LYMPHOMA GRADE II OF INTRA-ABDOMINAL LYMPH NODES (HCC): Primary | ICD-10-CM

## 2021-10-27 DIAGNOSIS — C82.13 FOLLICULAR LYMPHOMA GRADE II OF INTRA-ABDOMINAL LYMPH NODES (HCC): ICD-10-CM

## 2021-10-27 LAB
ALBUMIN SERPL-MCNC: 4.25 G/DL (ref 3.5–5.2)
ALBUMIN/GLOB SERPL: 1.6 G/DL
ALP SERPL-CCNC: 159 U/L (ref 39–117)
ALT SERPL W P-5'-P-CCNC: 9 U/L (ref 1–33)
ANION GAP SERPL CALCULATED.3IONS-SCNC: 10 MMOL/L (ref 5–15)
ANISOCYTOSIS BLD QL: ABNORMAL
AST SERPL-CCNC: 16 U/L (ref 1–32)
BILIRUB SERPL-MCNC: 1 MG/DL (ref 0–1.2)
BUN SERPL-MCNC: 16 MG/DL (ref 8–23)
BUN/CREAT SERPL: 16.3 (ref 7–25)
CALCIUM SPEC-SCNC: 10.3 MG/DL (ref 8.6–10.5)
CHLORIDE SERPL-SCNC: 102 MMOL/L (ref 98–107)
CO2 SERPL-SCNC: 25 MMOL/L (ref 22–29)
CREAT SERPL-MCNC: 0.98 MG/DL (ref 0.57–1)
DEPRECATED RDW RBC AUTO: 51.8 FL (ref 37–54)
EOSINOPHIL # BLD MANUAL: 0.11 10*3/MM3 (ref 0–0.4)
EOSINOPHIL NFR BLD MANUAL: 2 % (ref 0.3–6.2)
ERYTHROCYTE [DISTWIDTH] IN BLOOD BY AUTOMATED COUNT: 16.8 % (ref 12.3–15.4)
FERRITIN SERPL-MCNC: 57.67 NG/ML (ref 13–150)
FOLATE SERPL-MCNC: 15.9 NG/ML (ref 4.78–24.2)
GFR SERPL CREATININE-BSD FRML MDRD: 54 ML/MIN/1.73
GLOBULIN UR ELPH-MCNC: 2.7 GM/DL
GLUCOSE SERPL-MCNC: 155 MG/DL (ref 65–99)
HCT VFR BLD AUTO: 33.3 % (ref 34–46.6)
HGB BLD-MCNC: 9.9 G/DL (ref 12–15.9)
HYPOCHROMIA BLD QL: ABNORMAL
IRON 24H UR-MRATE: 44 MCG/DL (ref 37–145)
IRON SATN MFR SERPL: 9 % (ref 20–50)
LYMPHOCYTES # BLD MANUAL: 1.62 10*3/MM3 (ref 0.7–3.1)
LYMPHOCYTES NFR BLD MANUAL: 18 % (ref 5–12)
LYMPHOCYTES NFR BLD MANUAL: 30 % (ref 19.6–45.3)
MCH RBC QN AUTO: 25.6 PG (ref 26.6–33)
MCHC RBC AUTO-ENTMCNC: 29.7 G/DL (ref 31.5–35.7)
MCV RBC AUTO: 86 FL (ref 79–97)
MONOCYTES # BLD AUTO: 0.97 10*3/MM3 (ref 0.1–0.9)
NEUTROPHILS # BLD AUTO: 2.71 10*3/MM3 (ref 1.7–7)
NEUTROPHILS NFR BLD MANUAL: 49 % (ref 42.7–76)
NEUTS BAND NFR BLD MANUAL: 1 % (ref 0–5)
OVALOCYTES BLD QL SMEAR: ABNORMAL
PLAT MORPH BLD: NORMAL
PLATELET # BLD AUTO: 183 10*3/MM3 (ref 140–450)
PMV BLD AUTO: 10.5 FL (ref 6–12)
POTASSIUM SERPL-SCNC: 4.5 MMOL/L (ref 3.5–5.2)
PROT SERPL-MCNC: 6.9 G/DL (ref 6–8.5)
RBC # BLD AUTO: 3.87 10*6/MM3 (ref 3.77–5.28)
RETICS # AUTO: 0.06 10*6/MM3 (ref 0.02–0.13)
RETICS/RBC NFR AUTO: 1.44 % (ref 0.7–1.9)
SCAN SLIDE: NORMAL
SCHISTOCYTES BLD QL SMEAR: ABNORMAL
SODIUM SERPL-SCNC: 137 MMOL/L (ref 136–145)
TIBC SERPL-MCNC: 514 MCG/DL (ref 298–536)
TRANSFERRIN SERPL-MCNC: 345 MG/DL (ref 200–360)
VIT B12 BLD-MCNC: 591 PG/ML (ref 211–946)
WBC # BLD AUTO: 5.41 10*3/MM3 (ref 3.4–10.8)

## 2021-10-27 PROCEDURE — 82728 ASSAY OF FERRITIN: CPT | Performed by: INTERNAL MEDICINE

## 2021-10-27 PROCEDURE — 85007 BL SMEAR W/DIFF WBC COUNT: CPT | Performed by: INTERNAL MEDICINE

## 2021-10-27 PROCEDURE — 84466 ASSAY OF TRANSFERRIN: CPT | Performed by: INTERNAL MEDICINE

## 2021-10-27 PROCEDURE — 82746 ASSAY OF FOLIC ACID SERUM: CPT | Performed by: INTERNAL MEDICINE

## 2021-10-27 PROCEDURE — 82607 VITAMIN B-12: CPT | Performed by: INTERNAL MEDICINE

## 2021-10-27 PROCEDURE — 83540 ASSAY OF IRON: CPT | Performed by: INTERNAL MEDICINE

## 2021-10-27 PROCEDURE — 85025 COMPLETE CBC W/AUTO DIFF WBC: CPT | Performed by: INTERNAL MEDICINE

## 2021-10-27 PROCEDURE — 85045 AUTOMATED RETICULOCYTE COUNT: CPT | Performed by: INTERNAL MEDICINE

## 2021-10-27 PROCEDURE — 99214 OFFICE O/P EST MOD 30 MIN: CPT | Performed by: INTERNAL MEDICINE

## 2021-10-27 PROCEDURE — 80053 COMPREHEN METABOLIC PANEL: CPT | Performed by: INTERNAL MEDICINE

## 2021-10-27 NOTE — PROGRESS NOTES
"  Name:  Rachana Justin  :  1940  Date:  10/27/2021     REFERRING PHYSICIAN  Troy Elkins MD    PRIMARY CARE PHYSICIAN  Shabnam De La Rosa MD    REASON FOR FOLLOWUP  1. 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    Dear Troy,    HISTORY OF PRESENT ILLNESS:   I saw Ms. Justin in follow up today in our hematology/oncology clinic. As you are aware, she is a pleasant, 81 y.o., white female with a history of hypertension, diabetes and COPD who presented to our ED on 2021 for abdominal pain. CT scans identified a right lower quadrant, mesenteric mass. Follow up was arranged with you in your clinic. A NM PET scan on 2021 showed that, in the interim, this mass had \"resolved\" while one in the left lower abdomen had \"developed\" (which likely represented the same lymph node(s) changing position, due to mobility of the mesentery). You ultimately took her to the OR on 2021 for a diagnostic laparoscopy (which had to be converted to an open laparotomy due to the location of the lymphadenopathy at the mesenteric root). A couple of lymph nodes and a small segment of the proximal ileum were successfully excised, and the final pathology results were consistent with grade 2 follicular lymphoma. Though she developed a postoperative ileus and anemia, she was able to be discharged on 2021. She was subsequently referred to our clinic for further evaluation and management.    INTERIM HISTORY:  Ms. Justin returns to clinic today for follow up again accompanied by her daughter. She overall tolerated her surgery well. Her bowel movements are moving normally now. She again denies any B-symptoms (fevers, chills, night sweats, unexplained weight loss). She is taking her PO iron supplement as instructed. Her only complaint today is, again, of (still improving) fatigue.    Past Medical History:   Diagnosis Date   • " Ambulates with cane    • Arthritis    • Atrial fibrillation (HCC)    • COPD (chronic obstructive pulmonary disease) (HCC)    • COVID-19 virus infection 2020   • Diabetes mellitus (HCC)    • Gallstones    • GERD (gastroesophageal reflux disease)    • Heart disease    • Hyperlipidemia    • Hypertension    • Mesenteric lymphadenopathy 2021    Added automatically from request for surgery [id number]   • Pulmonary hypertension (HCC)    • Status post laparoscopic cholecystectomy 3/9/2021       Past Surgical History:   Procedure Laterality Date   • CATARACT EXTRACTION Bilateral    • CHOLECYSTECTOMY N/A 3/26/2021    Procedure: CHOLECYSTECTOMY LAPAROSCOPIC (REQUESTS MARIVEL NEWMAN);  Surgeon: Keagan Lopez MD;  Location: [facility];  Service: General;  Laterality: N/A;   • COLONOSCOPY N/A 2021    Procedure: COLONOSCOPY;  Surgeon: Troy Elkins MD;  Location: [facility];  Service: Gastroenterology;  Laterality: N/A;   • DIAGNOSTIC LAPAROSCOPY N/A 2021    Procedure: DIAGNOSTIC LAPAROSCOPY CONVERTED TO  OPEN @ 0918 WITH SMALL BOWEL RESECTION;  Surgeon: Troy Elkins MD;  Location: [facility] OR;  Service: General;  Laterality: N/A;   • ENDOSCOPY N/A 2021    Procedure: ESOPHAGOGASTRODUODENOSCOPY WITH ANESTHESIA;  Surgeon: Troy Elkins MD;  Location: [facility];  Service: Gastroenterology;  Laterality: N/A;   • FRACTURE SURGERY      right foot orif   • PACEMAKER IMPLANTATION Left 2018       Social History     Socioeconomic History   • Marital status:    Tobacco Use   • Smoking status: Former Smoker     Packs/day: 0.50     Years: 10.00     Pack years: 5.00     Types: Cigarettes     Quit date: 3/1/1963     Years since quittin.6   • Smokeless tobacco: Never Used   Vaping Use   • Vaping Use: Never used   Substance and Sexual Activity   • Alcohol use: No   • Drug use: Never   • Sexual activity: Defer       Family History   Problem Relation Age of Onset   • Breast cancer Sister     • Breast cancer Sister    • Breast cancer Sister         40's   • Cancer Mother        No Known Allergies    Current Outpatient Medications   Medication Sig Dispense Refill   • acetaminophen (TYLENOL) 325 MG tablet Take 2 tablets by mouth Every 4 (Four) Hours As Needed for Mild Pain . 30 tablet 0   • amLODIPine (NORVASC) 5 MG tablet Take 1 tablet by mouth Daily. 30 tablet 3   • apixaban (ELIQUIS) 5 MG tablet tablet Take 5 mg by mouth 2 (Two) Times a Day. ON HOLD PER DR CEJA     • aspirin 81 MG chewable tablet Chew 81 mg Daily.     • atorvastatin (LIPITOR) 10 MG tablet Take 1 tablet by mouth Every Night. 30 tablet 3   • carvedilol (COREG) 12.5 MG tablet Take 12.5 mg by mouth 2 (Two) Times a Day With Meals.     • docusate sodium (Colace) 100 MG capsule Take 1 capsule by mouth 2 (Two) Times a Day As Needed for Constipation. 60 capsule 0   • ferrous sulfate 325 (65 FE) MG tablet Take 1 tablet by mouth 2 (two) times a day. 60 tablet 2   • fesoterodine fumarate (TOVIAZ ER) 8 MG tablet sustained-release 24 hour tablet Take 8 mg by mouth Daily.     • furosemide (LASIX) 40 MG tablet Take 40 mg by mouth 3 (Three) Times a Week if Needed (FLUID RETENTION).     • HYDROcodone-acetaminophen (NORCO) 5-325 MG per tablet Take 1 tablet by mouth Every 8 (Eight) Hours As Needed for Severe Pain . 8 tablet 0   • Insulin Glargine, 1 Unit Dial, (TOUJEO) 300 UNIT/ML solution pen-injector injection Inject 50 Units under the skin into the appropriate area as directed Daily.     • losartan (COZAAR) 50 MG tablet Take 50 mg by mouth Daily.     • omeprazole (priLOSEC) 40 MG capsule Take 40 mg by mouth Daily As Needed (stomach).     • potassium chloride ER (K-TAB) 20 MEQ tablet controlled-release ER tablet Take 20 mEq by mouth 3 (Three) Times a Week. Prior to [facility] Admission, Patient was on: only takes when she takes Furosemide     • SITagliptin (JANUVIA) 100 MG tablet Take 100 mg by mouth Daily.       No current facility-administered  medications for this visit.     REVIEW OF SYSTEMS  CONSTITUTIONAL:  No fever, chills or night sweats. Steadily improving fatigue since her laparotomy in mid-2021.  EYES:  No blurry vision, diplopia or other vision changes.  ENT:  No hearing loss, nosebleeds or sore throat.  CARDIOVASCULAR:  No palpitations, arrhythmia, syncopal episodes or edema.  PULMONARY:  No hemoptysis, wheezing, chronic cough or shortness of breath.  GASTROINTESTINAL:  No nausea or vomiting. No constipation or diarrhea. No abdominal pain.  GENITOURINARY:  No hematuria, kidney stones or frequent urination.  MUSCULOSKELETAL:  No joint or back pains.  INTEGUMENTARY: No rashes or pruritus.  ENDOCRINE:  No excessive thirst or hot flashes.  HEMATOLOGIC:  No history of free bleeding, spontaneous bleeding or clotting.  IMMUNOLOGIC:  No allergies or frequent infections.  NEUROLOGIC: No numbness, tingling, seizures or weakness.  PSYCHIATRIC:  No anxiety or depression.    PHYSICAL EXAMINATION  /68   Pulse 71   Temp 97.3 °F (36.3 °C) (Temporal)   Resp 18   Wt 93.2 kg (205 lb 6.4 oz)   SpO2 97%   BMI 33.17 kg/m²     Pain Score:  Pain Score    10/27/21 0905   PainSc: 0-No pain     PHQ-Score Total:  PHQ-9 Total Score:      ECO  GENERAL:  A well-developed, well-nourished, elderly, white female in no acute distress, ambulating with the assistance of a cane.  HEENT:  Pupils equally round and reactive to light. Extraocular muscles intact.  CARDIOVASCULAR:  Regular rate and rhythm. No murmurs, gallops or rubs.  LUNGS:  Clear to auscultation bilaterally.  ABDOMEN:  Soft, nontender, nondistended with positive bowel sounds.  EXTREMITIES:  No clubbing, cyanosis or edema bilaterally.  SKIN:  No rashes or petechiae.  NEURO:  Cranial nerves grossly intact. No focal deficits.  PSYCH:  Alert and oriented x3.    LABORATORY  Lab Results   Component Value Date    WBC 5.41 10/27/2021    HGB 9.9 (L) 10/27/2021    HCT 33.3 (L) 10/27/2021    MCV 86.0  10/27/2021     10/27/2021    NEUTROABS 3.19 10/06/2021       Lab Results   Component Value Date     10/27/2021    K 4.5 10/27/2021     10/27/2021    CO2 25.0 10/27/2021    BUN 16 10/27/2021    CREATININE 0.98 10/27/2021    GLUCOSE 155 (H) 10/27/2021    CALCIUM 10.3 10/27/2021    AST 16 10/27/2021    ALT 9 10/27/2021    ALKPHOS 159 (H) 10/27/2021    BILITOT 1.0 10/27/2021    PROTEINTOT 6.9 10/27/2021    ALBUMIN 4.25 10/27/2021     CBC (10/27/2021): WBCs: 5.41; HgB: 9.9; Hct: 33.3; platelets: 183  CBC (10/06/2021): WBCs: 4.85; HgB: 8.7; Hct: 29.8; platelets: 184  CBC (08/18/2021): WBCs: 6.65; HgB: 7.5; Hct: 24.5; platelets: 173    Iron profile (10/27/2021): serum iron: 44; % saturation: 9; TIBC: 514; ferritin:     IMAGING  CT abdomen and pelvis without contrast (05/29/2021):  Impression:  1) Adenopathy in the right lower quadrant mesentery concerning for malignancy. There is some adjacent inflammatory fat stranding with one of the nodes. Consider PET/CT for followup.  2) Mild thickening of the rectosigmoid colon could be due to incomplete distention or mild segmental colitis. There is colonic diverticulosis without focal diverticulitis.  3) Grossly normal small bowel and appendix.  4) Cholecystectomy with some mild fat stranding in the gallbladder fossa. This could be due to relatively recent surgery or some mild inflammation. No fluid collection.  5) Additional findings [are nonacute].    NM PET with fusion CT, skull base to mid-thigh (06/14/2021, compared to CT of the abdomen/pelvis dated 05/29/2021):  Impression:  1) Significant interval improvement in the appearance of the large right lower quadrant soft tissue masses. Residual nodules are present but they do not show FDG avidity.  2) Interval development of a large, soft tissue nodule in the left lower quadrant which does show hypermetabolic activity.    CT soft tissue neck with contrast (10/22/2021):  Impression: No cervical lymphadenopathy or  evidence of lymphomatous involvement in the neck.    CT chest with contrast (10/22/2021):  Impression: Stable chest. No findings in the CT of the chest to suggest lymphoma.    CT abdomen and pelvis with contrast (10/22/2021):  Impression: The hypermetabolic soft tissue mass in the left abdomen on the PET fusion CT is not demonstrable on today's CT. There is fatty change in the liver with a small amount of ascites around the liver. There are prominent lymph nodes along the iliac lymph node chains in the pelvis that are in the 1 cm size range.    PATHOLOGY  Lymph node, small bowel mesenteric #1 (08/12/2021):  Follicular lymphoma, grade 2.    Lymph node, small bowel mesenteric #2 (08/12/2021):  Follicular lymphoma, grade 2.    Small bowel segment, proximal ileum (08/12/2021):  Follicular lymphoma, grade 2. Small bowel also involved by follicular lymphoma. Viable surgical margins.    IMPRESSION AND PLAN  Ms. Justin is an 81 y.o., white female with:  1. Follicular lymphoma: Diagnosed in midsummer 2021 with, technically, stage IV (a solid organ, the small bowel, was involved), but now fully (or at least mostly) resected disease. I had another long discussion with the patient and her daughter in clinic today regarding this diagnosis and, in general terms, its prognosis. We discussed how the new, postoperative CT scans of the neck, chest, abdomen and pelvis (performed on 10/22/2021 and summarized above) currently show no evidence of active lymphoma (a few, prominent, but only ~1 cm in size, lymph nodes in the iliac chains were the only notable findings; there is no evidence of any adenopathy at all anywhere else). Given this, expectant monitoring is now definitely recommended. Particularly since she is 81-years old, this indolent disease may never require specific treatment again in her lifetime (although, even if it does, a number of options are available to us). Due to issue #2, we will see her back in clinic in three  months with repeat labs for a symptom check. We will repeat a CT of the abdomen and pelvis in ~six months.  2. Anemia: Further Improved today (9.9 g/dL) compared to both one and two months ago. Ongoing iron deficiency status post surgery in mid-August 2021 is still playing a large role. Continue ferrous sulfate 325 mg PO BID. We will see her back in clinic in three months with repeat labs.  The patient and her daughter were in agreement with these plans.    It is a pleasure to participate in Ms. Justin's care. Please do not hesitate to call with any questions or concerns that you may have.    A total of 30 minutes were spent coordinating this patient’s care in clinic today; more than 50% of this time was face-to-face with the patient and her daughter, reviewing her interim medical history, discussing the results of the recent CT scans and labs and counseling on the current treatment and followup plan. All questions were answered to their satisfaction.    FOLLOW UP  With surgery, as previously planned. Continue PO ferrous sulfate BID. Return to our clinic in 3 months with a CBC, CMP, iron panel and ferritin level. Repeat a CT of the abdomen and pelvis with contrast in ~6 months.            This document was electronically signed by MYAH Vinson MD October 27, 2021 09:57 EDT      CC: MD Shabnam Cline MD

## 2021-12-29 ENCOUNTER — HOSPITAL ENCOUNTER (OUTPATIENT)
Dept: MAMMOGRAPHY | Facility: HOSPITAL | Age: 81
Discharge: HOME | End: 2021-12-29
Admitting: INTERNAL MEDICINE

## 2021-12-29 DIAGNOSIS — Z12.31 VISIT FOR SCREENING MAMMOGRAM: ICD-10-CM

## 2021-12-29 PROCEDURE — 77063 BREAST TOMOSYNTHESIS BI: CPT

## 2021-12-29 PROCEDURE — 77067 SCR MAMMO BI INCL CAD: CPT

## 2021-12-29 PROCEDURE — 77067 SCR MAMMO BI INCL CAD: CPT | Performed by: RADIOLOGY

## 2021-12-29 PROCEDURE — 77063 BREAST TOMOSYNTHESIS BI: CPT | Performed by: RADIOLOGY

## 2022-02-21 ENCOUNTER — OFFICE VISIT (OUTPATIENT)
Dept: ONCOLOGY | Facility: CLINIC | Age: 82
End: 2022-02-21

## 2022-02-21 ENCOUNTER — LAB (OUTPATIENT)
Dept: ONCOLOGY | Facility: CLINIC | Age: 82
End: 2022-02-21

## 2022-02-21 VITALS
HEIGHT: 66 IN | RESPIRATION RATE: 18 BRPM | DIASTOLIC BLOOD PRESSURE: 62 MMHG | TEMPERATURE: 97.1 F | SYSTOLIC BLOOD PRESSURE: 127 MMHG | BODY MASS INDEX: 33.2 KG/M2 | WEIGHT: 206.6 LBS | OXYGEN SATURATION: 96 % | HEART RATE: 70 BPM

## 2022-02-21 DIAGNOSIS — C82.13 FOLLICULAR LYMPHOMA GRADE II OF INTRA-ABDOMINAL LYMPH NODES: Primary | ICD-10-CM

## 2022-02-21 DIAGNOSIS — C82.13 FOLLICULAR LYMPHOMA GRADE II OF INTRA-ABDOMINAL LYMPH NODES: ICD-10-CM

## 2022-02-21 LAB
ALBUMIN SERPL-MCNC: 4.07 G/DL (ref 3.5–5.2)
ALBUMIN/GLOB SERPL: 1.6 G/DL
ALP SERPL-CCNC: 211 U/L (ref 39–117)
ALT SERPL W P-5'-P-CCNC: 11 U/L (ref 1–33)
ANION GAP SERPL CALCULATED.3IONS-SCNC: 13.5 MMOL/L (ref 5–15)
AST SERPL-CCNC: 16 U/L (ref 1–32)
BASOPHILS # BLD AUTO: 0.02 10*3/MM3 (ref 0–0.2)
BASOPHILS NFR BLD AUTO: 0.4 % (ref 0–1.5)
BILIRUB SERPL-MCNC: 0.8 MG/DL (ref 0–1.2)
BUN SERPL-MCNC: 25 MG/DL (ref 8–23)
BUN/CREAT SERPL: 21.2 (ref 7–25)
CALCIUM SPEC-SCNC: 9.8 MG/DL (ref 8.6–10.5)
CHLORIDE SERPL-SCNC: 101 MMOL/L (ref 98–107)
CO2 SERPL-SCNC: 22.5 MMOL/L (ref 22–29)
CREAT SERPL-MCNC: 1.18 MG/DL (ref 0.57–1)
DEPRECATED RDW RBC AUTO: 50.4 FL (ref 37–54)
EOSINOPHIL # BLD AUTO: 0.03 10*3/MM3 (ref 0–0.4)
EOSINOPHIL NFR BLD AUTO: 0.6 % (ref 0.3–6.2)
ERYTHROCYTE [DISTWIDTH] IN BLOOD BY AUTOMATED COUNT: 14.9 % (ref 12.3–15.4)
FERRITIN SERPL-MCNC: 87.74 NG/ML (ref 13–150)
GFR SERPL CREATININE-BSD FRML MDRD: 44 ML/MIN/1.73
GLOBULIN UR ELPH-MCNC: 2.5 GM/DL
GLUCOSE SERPL-MCNC: 170 MG/DL (ref 65–99)
HCT VFR BLD AUTO: 34.7 % (ref 34–46.6)
HGB BLD-MCNC: 10.9 G/DL (ref 12–15.9)
IMM GRANULOCYTES # BLD AUTO: 0.02 10*3/MM3 (ref 0–0.05)
IMM GRANULOCYTES NFR BLD AUTO: 0.4 % (ref 0–0.5)
IRON 24H UR-MRATE: 58 MCG/DL (ref 37–145)
IRON SATN MFR SERPL: 15 % (ref 20–50)
LYMPHOCYTES # BLD AUTO: 1.49 10*3/MM3 (ref 0.7–3.1)
LYMPHOCYTES NFR BLD AUTO: 28.1 % (ref 19.6–45.3)
MCH RBC QN AUTO: 29 PG (ref 26.6–33)
MCHC RBC AUTO-ENTMCNC: 31.4 G/DL (ref 31.5–35.7)
MCV RBC AUTO: 92.3 FL (ref 79–97)
MONOCYTES # BLD AUTO: 0.54 10*3/MM3 (ref 0.1–0.9)
MONOCYTES NFR BLD AUTO: 10.2 % (ref 5–12)
NEUTROPHILS NFR BLD AUTO: 3.21 10*3/MM3 (ref 1.7–7)
NEUTROPHILS NFR BLD AUTO: 60.3 % (ref 42.7–76)
NRBC BLD AUTO-RTO: 0 /100 WBC (ref 0–0.2)
PLATELET # BLD AUTO: 142 10*3/MM3 (ref 140–450)
PMV BLD AUTO: 10 FL (ref 6–12)
POTASSIUM SERPL-SCNC: 4.2 MMOL/L (ref 3.5–5.2)
PROT SERPL-MCNC: 6.6 G/DL (ref 6–8.5)
RBC # BLD AUTO: 3.76 10*6/MM3 (ref 3.77–5.28)
SODIUM SERPL-SCNC: 137 MMOL/L (ref 136–145)
TIBC SERPL-MCNC: 380 MCG/DL (ref 298–536)
TRANSFERRIN SERPL-MCNC: 255 MG/DL (ref 200–360)
WBC NRBC COR # BLD: 5.31 10*3/MM3 (ref 3.4–10.8)

## 2022-02-21 PROCEDURE — 80053 COMPREHEN METABOLIC PANEL: CPT | Performed by: INTERNAL MEDICINE

## 2022-02-21 PROCEDURE — 83540 ASSAY OF IRON: CPT | Performed by: INTERNAL MEDICINE

## 2022-02-21 PROCEDURE — 99214 OFFICE O/P EST MOD 30 MIN: CPT | Performed by: INTERNAL MEDICINE

## 2022-02-21 PROCEDURE — 82728 ASSAY OF FERRITIN: CPT | Performed by: INTERNAL MEDICINE

## 2022-02-21 PROCEDURE — 84466 ASSAY OF TRANSFERRIN: CPT | Performed by: INTERNAL MEDICINE

## 2022-02-21 PROCEDURE — 85025 COMPLETE CBC W/AUTO DIFF WBC: CPT | Performed by: INTERNAL MEDICINE

## 2022-02-21 RX ORDER — BUMETANIDE 1 MG/1
1 TABLET ORAL DAILY
COMMUNITY

## 2022-02-21 RX ORDER — SPIRONOLACTONE 25 MG/1
25 TABLET ORAL DAILY
COMMUNITY

## 2022-02-21 NOTE — PROGRESS NOTES
"  Name:  Rachana Justin  :  1940  Date:  2022     REFERRING PHYSICIAN  Troy Elkins MD    PRIMARY CARE PHYSICIAN  Shabnam De La Rosa MD    REASON FOR FOLLOWUP  1. 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    Dear Troy,    HISTORY OF PRESENT ILLNESS:   I saw Ms. Justin in follow up today in our hematology/oncology clinic. As you are aware, she is a pleasant, 81 y.o., white female with a history of hypertension, diabetes and COPD who presented to our ED on 2021 for abdominal pain. CT scans identified a right lower quadrant, mesenteric mass. Follow up was arranged with you in your clinic. A NM PET scan on 2021 showed that, in the interim, this mass had \"resolved\" while one in the left lower abdomen had \"developed\" (which likely represented the same lymph node(s) changing position, due to mobility of the mesentery). You ultimately took her to the OR on 2021 for a diagnostic laparoscopy (which had to be converted to an open laparotomy due to the location of the lymphadenopathy at the mesenteric root). A couple of lymph nodes and a small segment of the proximal ileum were successfully excised, and the final pathology results were consistent with grade 2 follicular lymphoma. Though she developed a postoperative ileus and anemia, she was able to be discharged on 2021. She was subsequently referred to our clinic for further evaluation and management.    INTERIM HISTORY:  Ms. Justin returns to clinic today for follow up by herself. Her bowel movements are still moving normally now. She again denies any B-symptoms (fevers, chills, night sweats, unexplained weight loss). She is still taking her PO iron supplement once daily, as instructed. She has no new or specific complaints in clinic today.    Past Medical History:   Diagnosis Date   • Ambulates with cane    • Arthritis    • Atrial " fibrillation (HCC)    • COPD (chronic obstructive pulmonary disease) (HCC)    • COVID-19 virus infection 2020   • Diabetes mellitus (HCC)    • Gallstones    • GERD (gastroesophageal reflux disease)    • Heart disease    • Hyperlipidemia    • Hypertension    • Mesenteric lymphadenopathy 2021    Added automatically from request for surgery [id number]   • Pulmonary hypertension (HCC)    • Status post laparoscopic cholecystectomy 3/9/2021       Past Surgical History:   Procedure Laterality Date   • CATARACT EXTRACTION Bilateral    • CHOLECYSTECTOMY N/A 3/26/2021    Procedure: CHOLECYSTECTOMY LAPAROSCOPIC (REQUESTS MARIVEL NEWMAN);  Surgeon: Keagan Lopez MD;  Location: [facility] OR;  Service: General;  Laterality: N/A;   • COLONOSCOPY N/A 2021    Procedure: COLONOSCOPY;  Surgeon: Troy Elkins MD;  Location: [facility] OR;  Service: Gastroenterology;  Laterality: N/A;   • DIAGNOSTIC LAPAROSCOPY N/A 2021    Procedure: DIAGNOSTIC LAPAROSCOPY CONVERTED TO  OPEN @ 0918 WITH SMALL BOWEL RESECTION;  Surgeon: Troy Elkins MD;  Location: [facility] OR;  Service: General;  Laterality: N/A;   • ENDOSCOPY N/A 2021    Procedure: ESOPHAGOGASTRODUODENOSCOPY WITH ANESTHESIA;  Surgeon: Troy Elkins MD;  Location: [facility] OR;  Service: Gastroenterology;  Laterality: N/A;   • FRACTURE SURGERY      right foot orif   • PACEMAKER IMPLANTATION Left 2018       Social History     Socioeconomic History   • Marital status:    Tobacco Use   • Smoking status: Former Smoker     Packs/day: 0.50     Years: 10.00     Pack years: 5.00     Types: Cigarettes     Quit date: 3/1/1963     Years since quittin.0   • Smokeless tobacco: Never Used   Vaping Use   • Vaping Use: Never used   Substance and Sexual Activity   • Alcohol use: No   • Drug use: Never   • Sexual activity: Defer       Family History   Problem Relation Age of Onset   • Breast cancer Sister    • Breast cancer Sister    • Breast cancer  Sister         40's   • Cancer Mother        No Known Allergies    Current Outpatient Medications   Medication Sig Dispense Refill   • apixaban (ELIQUIS) 5 MG tablet tablet Take 5 mg by mouth 2 (Two) Times a Day. ON HOLD PER DR CEJA     • atorvastatin (LIPITOR) 10 MG tablet Take 1 tablet by mouth Every Night. 30 tablet 3   • bumetanide (BUMEX) 1 MG tablet Take 1 mg by mouth Daily.     • carvedilol (COREG) 12.5 MG tablet Take 12.5 mg by mouth 2 (Two) Times a Day With Meals.     • docusate sodium (Colace) 100 MG capsule Take 1 capsule by mouth 2 (Two) Times a Day As Needed for Constipation. 60 capsule 0   • ferrous sulfate 325 (65 FE) MG tablet Take 1 tablet by mouth 2 (two) times a day. 60 tablet 2   • Insulin Glargine, 1 Unit Dial, (TOUJEO) 300 UNIT/ML solution pen-injector injection Inject 50 Units under the skin into the appropriate area as directed Daily.     • losartan (COZAAR) 50 MG tablet Take 50 mg by mouth Daily.     • omeprazole (priLOSEC) 40 MG capsule Take 40 mg by mouth Daily As Needed (stomach).     • potassium chloride ER (K-TAB) 20 MEQ tablet controlled-release ER tablet Take 20 mEq by mouth 3 (Three) Times a Week. Prior to [facility] Admission, Patient was on: only takes when she takes Furosemide     • SITagliptin (JANUVIA) 100 MG tablet Take 100 mg by mouth Daily.     • spironolactone (ALDACTONE) 25 MG tablet Take 25 mg by mouth Daily.     • acetaminophen (TYLENOL) 325 MG tablet Take 2 tablets by mouth Every 4 (Four) Hours As Needed for Mild Pain . 30 tablet 0   • amLODIPine (NORVASC) 5 MG tablet Take 1 tablet by mouth Daily. 30 tablet 3   • aspirin 81 MG chewable tablet Chew 81 mg Daily.     • fesoterodine fumarate (TOVIAZ ER) 8 MG tablet sustained-release 24 hour tablet Take 8 mg by mouth Daily.     • furosemide (LASIX) 40 MG tablet Take 40 mg by mouth 3 (Three) Times a Week if Needed (FLUID RETENTION).     • HYDROcodone-acetaminophen (NORCO) 5-325 MG per tablet Take 1 tablet by mouth Every 8  "(Eight) Hours As Needed for Severe Pain . 8 tablet 0     No current facility-administered medications for this visit.     REVIEW OF SYSTEMS  CONSTITUTIONAL:  No fever, chills or night sweats. Steadily improving fatigue since her laparotomy in mid-2021.  EYES:  No blurry vision, diplopia or other vision changes.  ENT:  No hearing loss, nosebleeds or sore throat.  CARDIOVASCULAR:  No palpitations, arrhythmia, syncopal episodes or edema.  PULMONARY:  No hemoptysis, wheezing, chronic cough or shortness of breath.  GASTROINTESTINAL:  No nausea or vomiting. No constipation or diarrhea. No abdominal pain.  GENITOURINARY:  No hematuria, kidney stones or frequent urination.  MUSCULOSKELETAL:  No joint or back pains.  INTEGUMENTARY: No rashes or pruritus.  ENDOCRINE:  No excessive thirst or hot flashes.  HEMATOLOGIC:  No history of free bleeding, spontaneous bleeding or clotting.  IMMUNOLOGIC:  No allergies or frequent infections.  NEUROLOGIC: No numbness, tingling, seizures or weakness.  PSYCHIATRIC:  No anxiety or depression.    PHYSICAL EXAMINATION  /62   Pulse 70   Temp 97.1 °F (36.2 °C) (Temporal)   Resp 18   Ht 167.6 cm (66\")   Wt 93.7 kg (206 lb 9.6 oz)   SpO2 96%   BMI 33.35 kg/m²     Pain Score:  Pain Score    22 1356   PainSc: 0-No pain     PHQ-Score Total:  PHQ-9 Total Score:      ECO  GENERAL:  A well-developed, well-nourished, elderly, white female in no acute distress, still ambulating with the assistance of a cane.  HEENT:  Pupils equally round and reactive to light. Extraocular muscles intact.  CARDIOVASCULAR:  Regular rate and rhythm. No murmurs, gallops or rubs.  LUNGS:  Clear to auscultation bilaterally.  ABDOMEN:  Soft, nontender, nondistended with positive bowel sounds.  EXTREMITIES:  No clubbing, cyanosis or edema bilaterally.  SKIN:  No rashes or petechiae.  NEURO:  Cranial nerves grossly intact. No focal deficits.  PSYCH:  Alert and oriented x3.    LABORATORY  Lab Results "   Component Value Date    WBC 5.31 02/21/2022    HGB 10.9 (L) 02/21/2022    HCT 34.7 02/21/2022    MCV 92.3 02/21/2022     02/21/2022    NEUTROABS 3.21 02/21/2022       Lab Results   Component Value Date     02/21/2022    K 4.2 02/21/2022     02/21/2022    CO2 22.5 02/21/2022    BUN 25 (H) 02/21/2022    CREATININE 1.18 (H) 02/21/2022    GLUCOSE 170 (H) 02/21/2022    CALCIUM 9.8 02/21/2022    AST 16 02/21/2022    ALT 11 02/21/2022    ALKPHOS 211 (H) 02/21/2022    BILITOT 0.8 02/21/2022    PROTEINTOT 6.6 02/21/2022    ALBUMIN 4.07 02/21/2022     CBC (02/21/2022): WBCs: 5.31; HgB: 10.9; Hct: 34.7; platelets: 142  CBC (10/27/2021): WBCs: 5.41; HgB: 9.9; Hct: 33.3; platelets: 183  CBC (10/06/2021): WBCs: 4.85; HgB: 8.7; Hct: 29.8; platelets: 184  CBC (08/18/2021): WBCs: 6.65; HgB: 7.5; Hct: 24.5; platelets: 173    Iron profile (02/21/2022): serum iron: 58; % saturation: 15; TIBC: 380; ferritin: 87.74 ng/mL  Iron profile (10/27/2021): serum iron: 44; % saturation: 9; TIBC: 514; ferritin: 57.67 ng/mL    IMAGING  CT abdomen and pelvis without contrast (05/29/2021):  Impression:  1) Adenopathy in the right lower quadrant mesentery concerning for malignancy. There is some adjacent inflammatory fat stranding with one of the nodes. Consider PET/CT for followup.  2) Mild thickening of the rectosigmoid colon could be due to incomplete distention or mild segmental colitis. There is colonic diverticulosis without focal diverticulitis.  3) Grossly normal small bowel and appendix.  4) Cholecystectomy with some mild fat stranding in the gallbladder fossa. This could be due to relatively recent surgery or some mild inflammation. No fluid collection.  5) Additional findings [are nonacute].    NM PET with fusion CT, skull base to mid-thigh (06/14/2021, compared to CT of the abdomen/pelvis dated 05/29/2021):  Impression:  1) Significant interval improvement in the appearance of the large right lower quadrant soft tissue  masses. Residual nodules are present but they do not show FDG avidity.  2) Interval development of a large, soft tissue nodule in the left lower quadrant which does show hypermetabolic activity.    CT soft tissue neck with contrast (10/22/2021):  Impression: No cervical lymphadenopathy or evidence of lymphomatous involvement in the neck.    CT chest with contrast (10/22/2021):  Impression: Stable chest. No findings in the CT of the chest to suggest lymphoma.    CT abdomen and pelvis with contrast (10/22/2021):  Impression: The hypermetabolic soft tissue mass in the left abdomen on the PET fusion CT is not demonstrable on today's CT. There is fatty change in the liver with a small amount of ascites around the liver. There are prominent lymph nodes along the iliac lymph node chains in the pelvis that are in the 1 cm size range.    PATHOLOGY  Lymph node, small bowel mesenteric #1 (08/12/2021):  Follicular lymphoma, grade 2.    Lymph node, small bowel mesenteric #2 (08/12/2021):  Follicular lymphoma, grade 2.    Small bowel segment, proximal ileum (08/12/2021):  Follicular lymphoma, grade 2. Small bowel also involved by follicular lymphoma. Viable surgical margins.    IMPRESSION AND PLAN  Ms. Justin is an 81 y.o., white female with:  1. Follicular lymphoma: Diagnosed in midsummer 2021 with, technically, stage IV (a solid organ, the small bowel, was involved), but now fully (or at least mostly) resected disease. I have had several, long discussions with the patient (+/- her daughter) since the time of her initial consultation in our clinic (on 10/06/2021) regarding this diagnosis and, in general terms, its prognosis. The new, postoperative CT scans of the neck, chest, abdomen and pelvis (performed on 10/22/2021 and summarized above) showed no evidence of active lymphoma (a few, prominent, but only ~1 cm in size, lymph nodes in the iliac chains were the only notable findings; there is no evidence of any adenopathy at all  anywhere else). Given this, expectant monitoring was, and still is, recommended. Particularly since she is 81-years old, this indolent disease may never require specific treatment again in her lifetime (although, even if it does, a number of options are available to us). We will see her back in clinic in another three months (~late May 2022) with a repeat CT of the abdomen and pelvis.  2. Anemia: Further improved today (10.9 g/dL) compared to last year. Iron deficiency status post surgery in mid-August 2021 has played a large role. Continue ferrous sulfate 325 mg PO daily. We will see her back in clinic in three months with repeat labs and a CT of the abdomen and pelvis.  The patient was in agreement with these plans.    It is a pleasure to participate in Ms. Justin's care. Please do not hesitate to call with any questions or concerns that you may have.    A total of 30 minutes were spent coordinating this patient’s care in clinic today; more than 50% of this time was face-to-face with the patient, reviewing her interim medical history, discussing the results of today's labwork and counseling on the current followup plan. All questions were answered to her satisfaction.    FOLLOW UP  With surgery, as previously planned. Continue PO ferrous sulfate daily. Return to our clinic in 3 months (~late May 2022) with a CBC, CMP, iron panel, ferritin and CT of the abdomen and pelvis without contrast.            This document was electronically signed by MYAH Vinson MD February 21, 2022 14:28 EST      CC: MD Shabnam Cline MD

## 2022-05-18 ENCOUNTER — HOSPITAL ENCOUNTER (OUTPATIENT)
Dept: CT IMAGING | Facility: HOSPITAL | Age: 82
Discharge: HOME | End: 2022-05-18
Admitting: INTERNAL MEDICINE

## 2022-05-18 DIAGNOSIS — C82.13 FOLLICULAR LYMPHOMA GRADE II OF INTRA-ABDOMINAL LYMPH NODES: ICD-10-CM

## 2022-05-18 PROCEDURE — 74176 CT ABD & PELVIS W/O CONTRAST: CPT

## 2022-05-18 PROCEDURE — 74176 CT ABD & PELVIS W/O CONTRAST: CPT | Performed by: RADIOLOGY

## 2022-06-01 ENCOUNTER — OFFICE VISIT (OUTPATIENT)
Dept: ONCOLOGY | Facility: CLINIC | Age: 82
End: 2022-06-01

## 2022-06-01 VITALS
DIASTOLIC BLOOD PRESSURE: 72 MMHG | RESPIRATION RATE: 18 BRPM | HEART RATE: 71 BPM | BODY MASS INDEX: 31.04 KG/M2 | SYSTOLIC BLOOD PRESSURE: 128 MMHG | OXYGEN SATURATION: 98 % | TEMPERATURE: 97.7 F | HEIGHT: 69 IN | WEIGHT: 209.6 LBS

## 2022-06-01 DIAGNOSIS — C82.13 FOLLICULAR LYMPHOMA GRADE II OF INTRA-ABDOMINAL LYMPH NODES: ICD-10-CM

## 2022-06-01 DIAGNOSIS — C82.13 FOLLICULAR LYMPHOMA GRADE II OF INTRA-ABDOMINAL LYMPH NODES: Primary | ICD-10-CM

## 2022-06-01 LAB
ALBUMIN SERPL-MCNC: 3.35 G/DL (ref 3.5–5.2)
ALBUMIN/GLOB SERPL: 1.2 G/DL
ALP SERPL-CCNC: 243 U/L (ref 39–117)
ALT SERPL W P-5'-P-CCNC: 13 U/L (ref 1–33)
ANION GAP SERPL CALCULATED.3IONS-SCNC: 10.3 MMOL/L (ref 5–15)
AST SERPL-CCNC: 15 U/L (ref 1–32)
BASOPHILS # BLD AUTO: 0.04 10*3/MM3 (ref 0–0.2)
BASOPHILS NFR BLD AUTO: 0.6 % (ref 0–1.5)
BILIRUB SERPL-MCNC: 0.8 MG/DL (ref 0–1.2)
BUN SERPL-MCNC: 22 MG/DL (ref 8–23)
BUN/CREAT SERPL: 22.4 (ref 7–25)
CALCIUM SPEC-SCNC: 9.8 MG/DL (ref 8.6–10.5)
CHLORIDE SERPL-SCNC: 104 MMOL/L (ref 98–107)
CO2 SERPL-SCNC: 20.7 MMOL/L (ref 22–29)
CREAT SERPL-MCNC: 0.98 MG/DL (ref 0.57–1)
DEPRECATED RDW RBC AUTO: 46.5 FL (ref 37–54)
EGFRCR SERPLBLD CKD-EPI 2021: 57.4 ML/MIN/1.73
EOSINOPHIL # BLD AUTO: 0.18 10*3/MM3 (ref 0–0.4)
EOSINOPHIL NFR BLD AUTO: 2.9 % (ref 0.3–6.2)
ERYTHROCYTE [DISTWIDTH] IN BLOOD BY AUTOMATED COUNT: 13.6 % (ref 12.3–15.4)
FERRITIN SERPL-MCNC: 176.6 NG/ML (ref 13–150)
GLOBULIN UR ELPH-MCNC: 2.8 GM/DL
GLUCOSE SERPL-MCNC: 247 MG/DL (ref 65–99)
HCT VFR BLD AUTO: 34.8 % (ref 34–46.6)
HGB BLD-MCNC: 11 G/DL (ref 12–15.9)
IMM GRANULOCYTES # BLD AUTO: 0.06 10*3/MM3 (ref 0–0.05)
IMM GRANULOCYTES NFR BLD AUTO: 1 % (ref 0–0.5)
IRON 24H UR-MRATE: 64 MCG/DL (ref 37–145)
IRON SATN MFR SERPL: 19 % (ref 20–50)
LYMPHOCYTES # BLD AUTO: 1.15 10*3/MM3 (ref 0.7–3.1)
LYMPHOCYTES NFR BLD AUTO: 18.3 % (ref 19.6–45.3)
MCH RBC QN AUTO: 29.6 PG (ref 26.6–33)
MCHC RBC AUTO-ENTMCNC: 31.6 G/DL (ref 31.5–35.7)
MCV RBC AUTO: 93.5 FL (ref 79–97)
MONOCYTES # BLD AUTO: 0.64 10*3/MM3 (ref 0.1–0.9)
MONOCYTES NFR BLD AUTO: 10.2 % (ref 5–12)
NEUTROPHILS NFR BLD AUTO: 4.22 10*3/MM3 (ref 1.7–7)
NEUTROPHILS NFR BLD AUTO: 67 % (ref 42.7–76)
NRBC BLD AUTO-RTO: 0 /100 WBC (ref 0–0.2)
PLATELET # BLD AUTO: 223 10*3/MM3 (ref 140–450)
PMV BLD AUTO: 9.7 FL (ref 6–12)
POTASSIUM SERPL-SCNC: 4.6 MMOL/L (ref 3.5–5.2)
PROT SERPL-MCNC: 6.1 G/DL (ref 6–8.5)
RBC # BLD AUTO: 3.72 10*6/MM3 (ref 3.77–5.28)
SODIUM SERPL-SCNC: 135 MMOL/L (ref 136–145)
TIBC SERPL-MCNC: 341 MCG/DL (ref 298–536)
TRANSFERRIN SERPL-MCNC: 229 MG/DL (ref 200–360)
WBC NRBC COR # BLD: 6.29 10*3/MM3 (ref 3.4–10.8)

## 2022-06-01 PROCEDURE — 85025 COMPLETE CBC W/AUTO DIFF WBC: CPT | Performed by: INTERNAL MEDICINE

## 2022-06-01 PROCEDURE — 82728 ASSAY OF FERRITIN: CPT | Performed by: INTERNAL MEDICINE

## 2022-06-01 PROCEDURE — 83540 ASSAY OF IRON: CPT | Performed by: INTERNAL MEDICINE

## 2022-06-01 PROCEDURE — 80053 COMPREHEN METABOLIC PANEL: CPT | Performed by: INTERNAL MEDICINE

## 2022-06-01 PROCEDURE — 84466 ASSAY OF TRANSFERRIN: CPT | Performed by: INTERNAL MEDICINE

## 2022-06-01 PROCEDURE — 99214 OFFICE O/P EST MOD 30 MIN: CPT | Performed by: INTERNAL MEDICINE

## 2022-06-01 RX ORDER — LANOLIN ALCOHOL/MO/W.PET/CERES
1000 CREAM (GRAM) TOPICAL DAILY
COMMUNITY

## 2022-06-01 RX ORDER — ERGOCALCIFEROL 1.25 MG/1
50000 CAPSULE ORAL WEEKLY
COMMUNITY

## 2022-06-01 NOTE — PROGRESS NOTES
"  Name:  Rachana Justin  :  1940  Date:  2022     REFERRING PHYSICIAN  Troy Elkins MD    PRIMARY CARE PHYSICIAN  Shabnam DeL a Rosa MD    REASON FOR FOLLOWUP  1. 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    Dear Troy,    HISTORY OF PRESENT ILLNESS:   I saw Ms. Justin in follow up today in our hematology/oncology clinic. As you are aware, she is a pleasant, 81 y.o., white female with a history of hypertension, diabetes and COPD who presented to our ED on 2021 for abdominal pain. CT scans identified a right lower quadrant, mesenteric mass. Follow up was arranged with you in your clinic. A NM PET scan on 2021 showed that, in the interim, this mass had \"resolved\" while one in the left lower abdomen had \"developed\" (which likely represented the same lymph node(s) changing position, due to mobility of the mesentery). You ultimately took her to the OR on 2021 for a diagnostic laparoscopy (which had to be converted to an open laparotomy due to the location of the lymphadenopathy at the mesenteric root). A couple of lymph nodes and a small segment of the proximal ileum were successfully excised, and the final pathology results were consistent with grade 2 follicular lymphoma. Though she developed a postoperative ileus and anemia, she was able to be discharged on 2021. She was subsequently referred to our clinic for further evaluation and management. Routine monitoring was, and remains, recommended.    INTERIM HISTORY:  Ms. Justin returns to clinic today for follow up accompanied by her daughter. Her bowel movements are still moving normally now. She again denies any B-symptoms (fevers, chills, night sweats, unexplained weight loss). She is still taking her PO iron supplement once daily, as previously instructed. She has no new or specific complaints in clinic today.    Past Medical History:   Diagnosis Date   • Ambulates with cane  "   • Arthritis    • Atrial fibrillation (HCC)    • COPD (chronic obstructive pulmonary disease) (HCC)    • COVID-19 virus infection 2020   • Diabetes mellitus (HCC)    • Gallstones    • GERD (gastroesophageal reflux disease)    • Heart disease    • Hyperlipidemia    • Hypertension    • Mesenteric lymphadenopathy 2021    Added automatically from request for surgery [id number]   • Pulmonary hypertension (HCC)    • Status post laparoscopic cholecystectomy 3/9/2021       Past Surgical History:   Procedure Laterality Date   • CATARACT EXTRACTION Bilateral    • CHOLECYSTECTOMY N/A 3/26/2021    Procedure: CHOLECYSTECTOMY LAPAROSCOPIC (REQUESTS MARIVEL NEWMAN);  Surgeon: Keagan Lopez MD;  Location: [facility] OR;  Service: General;  Laterality: N/A;   • COLONOSCOPY N/A 2021    Procedure: COLONOSCOPY;  Surgeon: Troy Elkins MD;  Location: [facility] OR;  Service: Gastroenterology;  Laterality: N/A;   • DIAGNOSTIC LAPAROSCOPY N/A 2021    Procedure: DIAGNOSTIC LAPAROSCOPY CONVERTED TO  OPEN @ 0918 WITH SMALL BOWEL RESECTION;  Surgeon: Troy Elkins MD;  Location: [facility] OR;  Service: General;  Laterality: N/A;   • ENDOSCOPY N/A 2021    Procedure: ESOPHAGOGASTRODUODENOSCOPY WITH ANESTHESIA;  Surgeon: Troy Elkins MD;  Location: [facility] OR;  Service: Gastroenterology;  Laterality: N/A;   • FRACTURE SURGERY      right foot orif   • PACEMAKER IMPLANTATION Left 2018       Social History     Socioeconomic History   • Marital status:    Tobacco Use   • Smoking status: Former Smoker     Packs/day: 0.50     Years: 10.00     Pack years: 5.00     Types: Cigarettes     Quit date: 3/1/1963     Years since quittin.2   • Smokeless tobacco: Never Used   Vaping Use   • Vaping Use: Never used   Substance and Sexual Activity   • Alcohol use: No   • Drug use: Never   • Sexual activity: Defer       Family History   Problem Relation Age of Onset   • Breast cancer Sister    • Breast cancer  Sister    • Breast cancer Sister         40's   • Cancer Mother        Allergies   Allergen Reactions   • Nitrofurantoin GI Intolerance       Current Outpatient Medications   Medication Sig Dispense Refill   • apixaban (ELIQUIS) 5 MG tablet tablet Take 5 mg by mouth 2 (Two) Times a Day. ON HOLD PER DR CEJA     • aspirin 81 MG chewable tablet Chew 81 mg Daily.     • atorvastatin (LIPITOR) 10 MG tablet Take 1 tablet by mouth Every Night. 30 tablet 3   • bumetanide (BUMEX) 1 MG tablet Take 1 mg by mouth Daily.     • carvedilol (COREG) 12.5 MG tablet Take 12.5 mg by mouth 2 (Two) Times a Day With Meals.     • docusate sodium (Colace) 100 MG capsule Take 1 capsule by mouth 2 (Two) Times a Day As Needed for Constipation. 60 capsule 0   • ferrous sulfate 325 (65 FE) MG tablet Take 1 tablet by mouth 2 (two) times a day. 60 tablet 2   • fesoterodine fumarate (TOVIAZ ER) 8 MG tablet sustained-release 24 hour tablet Take 8 mg by mouth Daily.     • losartan (COZAAR) 50 MG tablet Take 50 mg by mouth Daily.     • SITagliptin (JANUVIA) 100 MG tablet Take 100 mg by mouth Daily.     • vitamin B-12 (CYANOCOBALAMIN) 1000 MCG tablet Take 1,000 mcg by mouth Daily.     • vitamin D (ERGOCALCIFEROL) 1.25 MG (22400 UT) capsule capsule Take 50,000 Units by mouth 1 (One) Time Per Week.     • acetaminophen (TYLENOL) 325 MG tablet Take 2 tablets by mouth Every 4 (Four) Hours As Needed for Mild Pain . 30 tablet 0   • amLODIPine (NORVASC) 5 MG tablet Take 1 tablet by mouth Daily. 30 tablet 3   • furosemide (LASIX) 40 MG tablet Take 40 mg by mouth 3 (Three) Times a Week if Needed (FLUID RETENTION).     • HYDROcodone-acetaminophen (NORCO) 5-325 MG per tablet Take 1 tablet by mouth Every 8 (Eight) Hours As Needed for Severe Pain . 8 tablet 0   • Insulin Glargine, 1 Unit Dial, (TOUJEO) 300 UNIT/ML solution pen-injector injection Inject 50 Units under the skin into the appropriate area as directed Daily.     • omeprazole (priLOSEC) 40 MG capsule  "Take 40 mg by mouth Daily As Needed (stomach).     • potassium chloride ER (K-TAB) 20 MEQ tablet controlled-release ER tablet Take 20 mEq by mouth 3 (Three) Times a Week. Prior to [facility] Admission, Patient was on: only takes when she takes Furosemide     • spironolactone (ALDACTONE) 25 MG tablet Take 25 mg by mouth Daily.       No current facility-administered medications for this visit.     REVIEW OF SYSTEMS  CONSTITUTIONAL:  No fever, chills or night sweats. Improved fatigue compared to last year.  EYES:  No blurry vision, diplopia or other vision changes.  ENT:  No hearing loss, nosebleeds or sore throat.  CARDIOVASCULAR:  No palpitations, arrhythmia, syncopal episodes or edema.  PULMONARY:  No hemoptysis, wheezing, chronic cough or shortness of breath.  GASTROINTESTINAL:  No nausea or vomiting. No constipation or diarrhea. No abdominal pain.  GENITOURINARY:  No hematuria, kidney stones or frequent urination.  MUSCULOSKELETAL:  No joint or back pains.  INTEGUMENTARY: No rashes or pruritus.  ENDOCRINE:  No excessive thirst or hot flashes.  HEMATOLOGIC:  No history of free bleeding, spontaneous bleeding or clotting.  IMMUNOLOGIC:  No allergies or frequent infections.  NEUROLOGIC: No numbness, tingling, seizures or weakness.  PSYCHIATRIC:  No anxiety or depression.    PHYSICAL EXAMINATION  /72   Pulse 71   Temp 97.7 °F (36.5 °C) (Temporal)   Resp 18   Ht 175.3 cm (69\")   Wt 95.1 kg (209 lb 9.6 oz)   SpO2 98%   BMI 30.95 kg/m²     Pain Score:  Pain Score    22 1134   PainSc: 0-No pain     PHQ-Score Total:  PHQ-9 Total Score:      ECO  GENERAL:  A well-developed, well-nourished, elderly, white female in no acute distress, still ambulating with the assistance of a cane.  HEENT:  Pupils equally round and reactive to light. Extraocular muscles intact.  CARDIOVASCULAR:  Regular rate and rhythm. No murmurs, gallops or rubs.  LUNGS:  Clear to auscultation bilaterally.  ABDOMEN:  Soft, nontender, " nondistended with positive bowel sounds.  EXTREMITIES:  No clubbing, cyanosis or edema bilaterally.  SKIN:  No rashes or petechiae.  NEURO:  Cranial nerves grossly intact. No focal deficits.  PSYCH:  Alert and oriented x3.    The physical exam is unchanged from the previous one.    LABORATORY  Lab Results   Component Value Date    WBC 6.29 06/01/2022    HGB 11.0 (L) 06/01/2022    HCT 34.8 06/01/2022    MCV 93.5 06/01/2022     06/01/2022    NEUTROABS 4.22 06/01/2022       Lab Results   Component Value Date     (L) 06/01/2022    K 4.6 06/01/2022     06/01/2022    CO2 20.7 (L) 06/01/2022    BUN 22 06/01/2022    CREATININE 0.98 06/01/2022    GLUCOSE 247 (H) 06/01/2022    CALCIUM 9.8 06/01/2022    AST 15 06/01/2022    ALT 13 06/01/2022    ALKPHOS 243 (H) 06/01/2022    BILITOT 0.8 06/01/2022    PROTEINTOT 6.1 06/01/2022    ALBUMIN 3.35 (L) 06/01/2022     CBC (06/01/2022): WBCs: 6.29; HgB: 11.0; Hct: 34.8; platelets: 223  CBC (02/21/2022): WBCs: 5.31; HgB: 10.9; Hct: 34.7; platelets: 142  CBC (10/27/2021): WBCs: 5.41; HgB: 9.9; Hct: 33.3; platelets: 183  CBC (10/06/2021): WBCs: 4.85; HgB: 8.7; Hct: 29.8; platelets: 184  CBC (08/18/2021): WBCs: 6.65; HgB: 7.5; Hct: 24.5; platelets: 173    Iron profile (06/01/2022): serum iron: 64; % saturation: 19; TIBC: 341; ferritin: 176.6 ng/mL  Iron profile (02/21/2022): serum iron: 58; % saturation: 15; TIBC: 380; ferritin: 87.74 ng/mL  Iron profile (10/27/2021): serum iron: 44; % saturation: 9; TIBC: 514; ferritin: 57.67 ng/mL    IMAGING  CT abdomen and pelvis without contrast (05/29/2021):  Impression:  1) Adenopathy in the right lower quadrant mesentery concerning for malignancy. There is some adjacent inflammatory fat stranding with one of the nodes. Consider PET/CT for followup.  2) Mild thickening of the rectosigmoid colon could be due to incomplete distention or mild segmental colitis. There is colonic diverticulosis without focal diverticulitis.  3) Grossly  normal small bowel and appendix.  4) Cholecystectomy with some mild fat stranding in the gallbladder fossa. This could be due to relatively recent surgery or some mild inflammation. No fluid collection.  5) Additional findings [are nonacute].    NM PET with fusion CT, skull base to mid-thigh (06/14/2021, compared to CT of the abdomen/pelvis dated 05/29/2021):  Impression:  1) Significant interval improvement in the appearance of the large right lower quadrant soft tissue masses. Residual nodules are present but they do not show FDG avidity.  2) Interval development of a large, soft tissue nodule in the left lower quadrant which does show hypermetabolic activity.    CT soft tissue neck with contrast (10/22/2021):  Impression: No cervical lymphadenopathy or evidence of lymphomatous involvement in the neck.    CT chest with contrast (10/22/2021):  Impression: Stable chest. No findings in the CT of the chest to suggest lymphoma.    CT abdomen and pelvis with contrast (10/22/2021):  Impression: The hypermetabolic soft tissue mass in the left abdomen on the PET fusion CT is not demonstrable on today's CT. There is fatty change in the liver with a small amount of ascites around the liver. There are prominent lymph nodes along the iliac lymph node chains in the pelvis that are in the 1 cm size range.    CT abdomen and pelvis without contrast (05/18/2022, compared to 10/22/2021):  Impression:  1) The heart is enlarged.  2) No enlarged retroperitoneal or mesenteric lymph nodes at this time. No evidence of residual or recurrent lymphoma.  3) Fat-containing umbilical hernia with a diameter of 3.5 cm.    PATHOLOGY  Lymph node, small bowel mesenteric #1 (08/12/2021):  Follicular lymphoma, grade 2.    Lymph node, small bowel mesenteric #2 (08/12/2021):  Follicular lymphoma, grade 2.    Small bowel segment, proximal ileum (08/12/2021):  Follicular lymphoma, grade 2. Small bowel also involved by follicular lymphoma. Viable surgical  margins.    IMPRESSION AND PLAN  Ms. Justin is an 81 y.o., white female with:  1. Follicular lymphoma: Diagnosed in midsummer 2021 with, technically, stage IV (a solid organ, the small bowel, was involved), but now fully (or at least mostly) resected disease. I have had multiple, long discussions with the patient (+/- her daughter) since the time of her initial consultation in our clinic (on 10/06/2021) regarding this diagnosis and, in general terms, its prognosis. The postoperative CT scans of the neck, chest, abdomen and pelvis (performed on 10/22/2021 and summarized above) showed no evidence of active lymphoma (a few, prominent, but only ~1 cm in size, lymph nodes in the iliac chains were the only notable findings; there is no evidence of any adenopathy at all anywhere else); and this continues to be the case on the most recent repeat imaging (a CT of the abdomen and pelvis performed on 05/18/2022 and also summarized above). Given this, continued, expectant monitoring alone remains recommended. Particularly since she is 81-years old, this indolent disease very well may never require any specific treatment again during her lifetime (although, even if it does, a number of options are available to us). We will see her back in clinic in six months for another wellness visit/symptom check. In the continued absence of a concerning clinical change (such as recurrent symptoms, etc), follow up imaging will be repeated no more frequently than annually from this point.  2. Anemia: Remains significantly improved (HgB of 11.0 g/dL today) compared to last year. Iron deficiency status post surgery in mid-August 2021 was playing a large role; and, now that this (the iron deficiency) has resolved (her ferritin has increased to 176 ng/mL as of today), the mild, residual, and recently stable anemia likely has more to do with renal insufficiency and chronic inflammation. She was instructed to discontinue her PO iron supplementation.  We will repeat labs at the time of her next follow up appointment (in six months).  The patient and her daughter were in agreement with these plans.    It is a pleasure to participate in Ms. Justin's care. Please do not hesitate to call with any questions or concerns that you may have.    A total of 30 minutes were spent coordinating this patient’s care in clinic today; more than 50% of this time was face-to-face with the patient and her daughter, reviewing her interim medical history, discussing the results of today's repeat labwork and the recent repeat CT of the abdomen and pelvis and counseling on the current followup plan. All questions were answered to their satisfaction.    FOLLOW UP  With surgery, as previously planned. Return to our clinic in 6 months with a CBC, CMP, iron panel and ferritin level.            This document was electronically signed by MYAH Vinson MD June 1, 2022 12:25 EDT      CC: MD Shabnam Cline MD

## 2022-12-01 ENCOUNTER — OFFICE VISIT (OUTPATIENT)
Dept: ONCOLOGY | Facility: CLINIC | Age: 82
End: 2022-12-01

## 2022-12-01 ENCOUNTER — LAB (OUTPATIENT)
Dept: ONCOLOGY | Facility: CLINIC | Age: 82
End: 2022-12-01

## 2022-12-01 VITALS
BODY MASS INDEX: 29.62 KG/M2 | DIASTOLIC BLOOD PRESSURE: 68 MMHG | HEART RATE: 71 BPM | SYSTOLIC BLOOD PRESSURE: 144 MMHG | HEIGHT: 69 IN | OXYGEN SATURATION: 94 % | RESPIRATION RATE: 18 BRPM | WEIGHT: 200 LBS | TEMPERATURE: 97.5 F

## 2022-12-01 DIAGNOSIS — C82.13 FOLLICULAR LYMPHOMA GRADE II OF INTRA-ABDOMINAL LYMPH NODES: Primary | ICD-10-CM

## 2022-12-01 DIAGNOSIS — C82.13 FOLLICULAR LYMPHOMA GRADE II OF INTRA-ABDOMINAL LYMPH NODES: ICD-10-CM

## 2022-12-01 LAB
ALBUMIN SERPL-MCNC: 3.94 G/DL (ref 3.5–5.2)
ALBUMIN/GLOB SERPL: 1.4 G/DL
ALP SERPL-CCNC: 153 U/L (ref 39–117)
ALT SERPL W P-5'-P-CCNC: 6 U/L (ref 1–33)
ANION GAP SERPL CALCULATED.3IONS-SCNC: 11.2 MMOL/L (ref 5–15)
AST SERPL-CCNC: 18 U/L (ref 1–32)
BASOPHILS # BLD AUTO: 0.05 10*3/MM3 (ref 0–0.2)
BASOPHILS NFR BLD AUTO: 0.7 % (ref 0–1.5)
BILIRUB SERPL-MCNC: 1 MG/DL (ref 0–1.2)
BUN SERPL-MCNC: 19 MG/DL (ref 8–23)
BUN/CREAT SERPL: 18.8 (ref 7–25)
CALCIUM SPEC-SCNC: 9.6 MG/DL (ref 8.6–10.5)
CHLORIDE SERPL-SCNC: 102 MMOL/L (ref 98–107)
CO2 SERPL-SCNC: 22.8 MMOL/L (ref 22–29)
CREAT SERPL-MCNC: 1.01 MG/DL (ref 0.57–1)
DEPRECATED RDW RBC AUTO: 45.6 FL (ref 37–54)
EGFRCR SERPLBLD CKD-EPI 2021: 55 ML/MIN/1.73
EOSINOPHIL # BLD AUTO: 0.01 10*3/MM3 (ref 0–0.4)
EOSINOPHIL NFR BLD AUTO: 0.1 % (ref 0.3–6.2)
ERYTHROCYTE [DISTWIDTH] IN BLOOD BY AUTOMATED COUNT: 13.2 % (ref 12.3–15.4)
FERRITIN SERPL-MCNC: 150.5 NG/ML (ref 13–150)
GLOBULIN UR ELPH-MCNC: 2.9 GM/DL
GLUCOSE SERPL-MCNC: 269 MG/DL (ref 65–99)
HCT VFR BLD AUTO: 38.5 % (ref 34–46.6)
HGB BLD-MCNC: 12.3 G/DL (ref 12–15.9)
IMM GRANULOCYTES # BLD AUTO: 0.02 10*3/MM3 (ref 0–0.05)
IMM GRANULOCYTES NFR BLD AUTO: 0.3 % (ref 0–0.5)
IRON 24H UR-MRATE: 75 MCG/DL (ref 37–145)
IRON SATN MFR SERPL: 20 % (ref 20–50)
LYMPHOCYTES # BLD AUTO: 1.24 10*3/MM3 (ref 0.7–3.1)
LYMPHOCYTES NFR BLD AUTO: 17.5 % (ref 19.6–45.3)
MCH RBC QN AUTO: 30.2 PG (ref 26.6–33)
MCHC RBC AUTO-ENTMCNC: 31.9 G/DL (ref 31.5–35.7)
MCV RBC AUTO: 94.6 FL (ref 79–97)
MONOCYTES # BLD AUTO: 0.49 10*3/MM3 (ref 0.1–0.9)
MONOCYTES NFR BLD AUTO: 6.9 % (ref 5–12)
NEUTROPHILS NFR BLD AUTO: 5.28 10*3/MM3 (ref 1.7–7)
NEUTROPHILS NFR BLD AUTO: 74.5 % (ref 42.7–76)
NRBC BLD AUTO-RTO: 0 /100 WBC (ref 0–0.2)
PLATELET # BLD AUTO: 163 10*3/MM3 (ref 140–450)
PMV BLD AUTO: 10.3 FL (ref 6–12)
POTASSIUM SERPL-SCNC: 4 MMOL/L (ref 3.5–5.2)
PROT SERPL-MCNC: 6.8 G/DL (ref 6–8.5)
RBC # BLD AUTO: 4.07 10*6/MM3 (ref 3.77–5.28)
SODIUM SERPL-SCNC: 136 MMOL/L (ref 136–145)
TIBC SERPL-MCNC: 377 MCG/DL (ref 298–536)
TRANSFERRIN SERPL-MCNC: 253 MG/DL (ref 200–360)
WBC NRBC COR # BLD: 7.09 10*3/MM3 (ref 3.4–10.8)

## 2022-12-01 PROCEDURE — 85025 COMPLETE CBC W/AUTO DIFF WBC: CPT | Performed by: INTERNAL MEDICINE

## 2022-12-01 PROCEDURE — 83540 ASSAY OF IRON: CPT | Performed by: INTERNAL MEDICINE

## 2022-12-01 PROCEDURE — 80053 COMPREHEN METABOLIC PANEL: CPT | Performed by: INTERNAL MEDICINE

## 2022-12-01 PROCEDURE — 84466 ASSAY OF TRANSFERRIN: CPT | Performed by: INTERNAL MEDICINE

## 2022-12-01 PROCEDURE — 36415 COLL VENOUS BLD VENIPUNCTURE: CPT | Performed by: INTERNAL MEDICINE

## 2022-12-01 PROCEDURE — 82728 ASSAY OF FERRITIN: CPT | Performed by: INTERNAL MEDICINE

## 2022-12-01 PROCEDURE — 99214 OFFICE O/P EST MOD 30 MIN: CPT | Performed by: INTERNAL MEDICINE

## 2022-12-01 NOTE — PROGRESS NOTES
"  Name:  Rachana Justin  :  1940  Date:  2022     REFERRING PHYSICIAN  Troy Elkins MD    PRIMARY CARE PHYSICIAN  Shabnam De La Rosa MD    REASON FOR FOLLOWUP  1. 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    Dear Troy,    HISTORY OF PRESENT ILLNESS:   I saw Ms. Justin in follow up today in our hematology/oncology clinic. As you are aware, she is a pleasant, 82 y.o., white female with a history of hypertension, diabetes and COPD who presented to our ED on 2021 for abdominal pain. CT scans identified a right lower quadrant, mesenteric mass. Follow up was arranged with you in your clinic. A NM PET scan on 2021 showed that, in the interim, this mass had \"resolved\" while one in the left lower abdomen had \"developed\" (which likely represented the same lymph node(s) changing position, due to mobility of the mesentery). You ultimately took her to the OR on 2021 for a diagnostic laparoscopy (which had to be converted to an open laparotomy due to the location of the lymphadenopathy at the mesenteric root). A couple of lymph nodes and a small segment of the proximal ileum were successfully excised, and the final pathology results were consistent with grade 2 follicular lymphoma. Though she developed a postoperative ileus and anemia, she was able to be discharged on 2021. She was subsequently referred to our clinic for further evaluation and management. Routine monitoring was, and remains, recommended.    INTERIM HISTORY:  Ms. Justin returns to clinic today for follow up by herself (her daughter did not come with her this time because she, the daughter, apparently recently slipped on some ice and broke one of her feet). Ms. Justin again denies any B-symptoms (fevers, chills, night sweats, unexplained weight loss). She has discontinued her PO iron, as previously instructed. She is eating normally, continues to feel overall well and again " has no new or specific complaints.    Past Medical History:   Diagnosis Date   • Ambulates with cane    • Arthritis    • Atrial fibrillation (HCC)    • COPD (chronic obstructive pulmonary disease) (HCC)    • COVID-19 virus infection 2020   • Diabetes mellitus (HCC)    • Gallstones    • GERD (gastroesophageal reflux disease)    • Heart disease    • Hyperlipidemia    • Hypertension    • Mesenteric lymphadenopathy 2021    Added automatically from request for surgery [id number]   • Pulmonary hypertension (HCC)    • Status post laparoscopic cholecystectomy 3/9/2021       Past Surgical History:   Procedure Laterality Date   • CATARACT EXTRACTION Bilateral    • CHOLECYSTECTOMY N/A 3/26/2021    Procedure: CHOLECYSTECTOMY LAPAROSCOPIC (REQUESTS MARIVEL NEWMAN);  Surgeon: Keagan Lopez MD;  Location: [facility] OR;  Service: General;  Laterality: N/A;   • COLONOSCOPY N/A 2021    Procedure: COLONOSCOPY;  Surgeon: Troy Elkins MD;  Location: [facility] OR;  Service: Gastroenterology;  Laterality: N/A;   • DIAGNOSTIC LAPAROSCOPY N/A 2021    Procedure: DIAGNOSTIC LAPAROSCOPY CONVERTED TO  OPEN @ 18 WITH SMALL BOWEL RESECTION;  Surgeon: Troy Elkins MD;  Location: [facility] OR;  Service: General;  Laterality: N/A;   • ENDOSCOPY N/A 2021    Procedure: ESOPHAGOGASTRODUODENOSCOPY WITH ANESTHESIA;  Surgeon: Troy Elkins MD;  Location: [facility];  Service: Gastroenterology;  Laterality: N/A;   • FRACTURE SURGERY      right foot orif   • PACEMAKER IMPLANTATION Left 2018       Social History     Socioeconomic History   • Marital status:    Tobacco Use   • Smoking status: Former     Packs/day: 0.50     Years: 10.00     Pack years: 5.00     Types: Cigarettes     Quit date: 3/1/1963     Years since quittin.7   • Smokeless tobacco: Never   Vaping Use   • Vaping Use: Never used   Substance and Sexual Activity   • Alcohol use: No   • Drug use: Never   • Sexual activity: Defer        Family History   Problem Relation Age of Onset   • Breast cancer Sister    • Breast cancer Sister    • Breast cancer Sister         40's   • Cancer Mother        Allergies   Allergen Reactions   • Nitrofurantoin GI Intolerance       Current Outpatient Medications   Medication Sig Dispense Refill   • acetaminophen (TYLENOL) 325 MG tablet Take 2 tablets by mouth Every 4 (Four) Hours As Needed for Mild Pain . 30 tablet 0   • amLODIPine (NORVASC) 5 MG tablet Take 1 tablet by mouth Daily. 30 tablet 3   • apixaban (ELIQUIS) 5 MG tablet tablet Take 5 mg by mouth 2 (Two) Times a Day. ON HOLD PER DR CEJA     • aspirin 81 MG chewable tablet Chew 81 mg Daily.     • atorvastatin (LIPITOR) 10 MG tablet Take 1 tablet by mouth Every Night. 30 tablet 3   • bumetanide (BUMEX) 1 MG tablet Take 1 mg by mouth Daily.     • carvedilol (COREG) 12.5 MG tablet Take 12.5 mg by mouth 2 (Two) Times a Day With Meals.     • docusate sodium (Colace) 100 MG capsule Take 1 capsule by mouth 2 (Two) Times a Day As Needed for Constipation. 60 capsule 0   • ferrous sulfate 325 (65 FE) MG tablet Take 1 tablet by mouth 2 (two) times a day. 60 tablet 2   • fesoterodine fumarate (TOVIAZ ER) 8 MG tablet sustained-release 24 hour tablet Take 8 mg by mouth Daily.     • furosemide (LASIX) 40 MG tablet Take 40 mg by mouth 3 (Three) Times a Week if Needed (FLUID RETENTION).     • HYDROcodone-acetaminophen (NORCO) 5-325 MG per tablet Take 1 tablet by mouth Every 8 (Eight) Hours As Needed for Severe Pain . 8 tablet 0   • Insulin Glargine, 1 Unit Dial, (TOUJEO) 300 UNIT/ML solution pen-injector injection Inject 50 Units under the skin into the appropriate area as directed Daily.     • losartan (COZAAR) 50 MG tablet Take 50 mg by mouth Daily.     • omeprazole (priLOSEC) 40 MG capsule Take 40 mg by mouth Daily As Needed (stomach).     • potassium chloride ER (K-TAB) 20 MEQ tablet controlled-release ER tablet Take 20 mEq by mouth 3 (Three) Times a Week. Prior  "to [facility] Admission, Patient was on: only takes when she takes Furosemide     • SITagliptin (JANUVIA) 100 MG tablet Take 100 mg by mouth Daily.     • spironolactone (ALDACTONE) 25 MG tablet Take 25 mg by mouth Daily.     • vitamin B-12 (CYANOCOBALAMIN) 1000 MCG tablet Take 1,000 mcg by mouth Daily.     • vitamin D (ERGOCALCIFEROL) 1.25 MG (69132 UT) capsule capsule Take 50,000 Units by mouth 1 (One) Time Per Week.       No current facility-administered medications for this visit.     REVIEW OF SYSTEMS  CONSTITUTIONAL:  No fever, chills or night sweats. Improved fatigue compared to last year.  EYES:  No blurry vision, diplopia or other vision changes.  ENT:  No hearing loss, nosebleeds or sore throat.  CARDIOVASCULAR:  No palpitations, arrhythmia, syncopal episodes or edema.  PULMONARY:  No hemoptysis, wheezing, chronic cough or shortness of breath.  GASTROINTESTINAL:  No nausea or vomiting. No constipation or diarrhea. No abdominal pain.  GENITOURINARY:  No hematuria, kidney stones or frequent urination.  MUSCULOSKELETAL:  No joint or back pains.  INTEGUMENTARY: No rashes or pruritus.  ENDOCRINE:  No excessive thirst or hot flashes.  HEMATOLOGIC:  No history of free bleeding, spontaneous bleeding or clotting.  IMMUNOLOGIC:  No allergies or frequent infections.  NEUROLOGIC: No numbness, tingling, seizures or weakness.  PSYCHIATRIC:  No anxiety or depression.    PHYSICAL EXAMINATION  /68   Pulse 71   Temp 97.5 °F (36.4 °C)   Resp 18   Ht 175.3 cm (69\")   Wt 90.7 kg (200 lb)   SpO2 94%   BMI 29.53 kg/m²     Pain Score:  Pain Score    22 1210   PainSc: 0-No pain     PHQ-Score Total:  PHQ-9 Total Score:      ECO  GENERAL:  A well-developed, well-nourished, elderly, white female in no acute distress, still ambulating with the assistance of a cane.  HEENT:  Pupils equally round and reactive to light. Extraocular muscles intact.  CARDIOVASCULAR:  Regular rate and rhythm. No murmurs, gallops or " rubs.  LUNGS:  Clear to auscultation bilaterally.  ABDOMEN:  Soft, nontender, nondistended with positive bowel sounds.  EXTREMITIES:  No clubbing or cyanosis bilaterally. Stable, ~1-2+ edema in the bilateral lower extremities.  SKIN:  No rashes or petechiae.  NEURO:  Cranial nerves grossly intact. No focal deficits.  PSYCH:  Alert and oriented x3.    LABORATORY  Lab Results   Component Value Date    WBC 7.09 12/01/2022    HGB 12.3 12/01/2022    HCT 38.5 12/01/2022    MCV 94.6 12/01/2022     12/01/2022    NEUTROABS 5.28 12/01/2022       Lab Results   Component Value Date     12/01/2022    K 4.0 12/01/2022     12/01/2022    CO2 22.8 12/01/2022    BUN 19 12/01/2022    CREATININE 1.01 (H) 12/01/2022    GLUCOSE 269 (H) 12/01/2022    CALCIUM 9.6 12/01/2022    AST 18 12/01/2022    ALT 6 12/01/2022    ALKPHOS 153 (H) 12/01/2022    BILITOT 1.0 12/01/2022    PROTEINTOT 6.8 12/01/2022    ALBUMIN 3.94 12/01/2022     CBC (12/01/2022): WBCs: 7.09; HgB: 12.3; Hct: 38.5; platelets: 163  CBC (06/01/2022): WBCs: 6.29; HgB: 11.0; Hct: 34.8; platelets: 223  CBC (02/21/2022): WBCs: 5.31; HgB: 10.9; Hct: 34.7; platelets: 142  CBC (10/27/2021): WBCs: 5.41; HgB: 9.9; Hct: 33.3; platelets: 183  CBC (10/06/2021): WBCs: 4.85; HgB: 8.7; Hct: 29.8; platelets: 184  CBC (08/18/2021): WBCs: 6.65; HgB: 7.5; Hct: 24.5; platelets: 173    Iron profile (12/01/2022): serum iron: 75; % saturation: 20; TIBC: 377; ferritin: 150.5 ng/mL  Iron profile (06/01/2022): serum iron: 64; % saturation: 19; TIBC: 341; ferritin: 176.6 ng/mL  Iron profile (02/21/2022): serum iron: 58; % saturation: 15; TIBC: 380; ferritin: 87.74 ng/mL  Iron profile (10/27/2021): serum iron: 44; % saturation: 9; TIBC: 514; ferritin: 57.67 ng/mL    IMAGING  CT abdomen and pelvis without contrast (05/29/2021):  Impression:  1) Adenopathy in the right lower quadrant mesentery concerning for malignancy. There is some adjacent inflammatory fat stranding with one of the  nodes. Consider PET/CT for followup.  2) Mild thickening of the rectosigmoid colon could be due to incomplete distention or mild segmental colitis. There is colonic diverticulosis without focal diverticulitis.  3) Grossly normal small bowel and appendix.  4) Cholecystectomy with some mild fat stranding in the gallbladder fossa. This could be due to relatively recent surgery or some mild inflammation. No fluid collection.  5) Additional findings [are nonacute].    NM PET with fusion CT, skull base to mid-thigh (06/14/2021, compared to CT of the abdomen/pelvis dated 05/29/2021):  Impression:  1) Significant interval improvement in the appearance of the large right lower quadrant soft tissue masses. Residual nodules are present but they do not show FDG avidity.  2) Interval development of a large, soft tissue nodule in the left lower quadrant which does show hypermetabolic activity.    CT soft tissue neck with contrast (10/22/2021):  Impression: No cervical lymphadenopathy or evidence of lymphomatous involvement in the neck.    CT chest with contrast (10/22/2021):  Impression: Stable chest. No findings in the CT of the chest to suggest lymphoma.    CT abdomen and pelvis with contrast (10/22/2021):  Impression: The hypermetabolic soft tissue mass in the left abdomen on the PET fusion CT is not demonstrable on today's CT. There is fatty change in the liver with a small amount of ascites around the liver. There are prominent lymph nodes along the iliac lymph node chains in the pelvis that are in the 1 cm size range.    CT abdomen and pelvis without contrast (05/18/2022, compared to 10/22/2021):  Impression:  1) The heart is enlarged.  2) No enlarged retroperitoneal or mesenteric lymph nodes at this time. No evidence of residual or recurrent lymphoma.  3) Fat-containing umbilical hernia with a diameter of 3.5 cm.    PATHOLOGY  Lymph node, small bowel mesenteric #1 (08/12/2021):  Follicular lymphoma, grade 2.    Lymph node,  small bowel mesenteric #2 (08/12/2021):  Follicular lymphoma, grade 2.    Small bowel segment, proximal ileum (08/12/2021):  Follicular lymphoma, grade 2. Small bowel also involved by follicular lymphoma. Viable surgical margins.    IMPRESSION AND PLAN  Ms. Justin is an 82 y.o., white female with:  1. Follicular lymphoma: Diagnosed in midsummer 2021 with, technically, stage IV (a solid organ, the small bowel, was involved), but now fully (or at least mostly) resected disease. I have had multiple, long discussions with the patient (+/- her daughter) since the time of her initial consultation in our clinic (on 10/06/2021) regarding this diagnosis and, in general terms, its prognosis. The postoperative CT scans of the neck, chest, abdomen and pelvis (performed on 10/22/2021 and summarized above) showed no evidence of active lymphoma (a few, prominent, but only ~1 cm in size, lymph nodes in the iliac chains were the only notable findings; there was/is no evidence of any adenopathy at all anywhere else); and this continues to be the case on the most recent repeat imaging (a CT of the abdomen and pelvis performed on 05/18/2022 and also summarized above). Given this, continued, expectant monitoring alone remains recommended. Particularly since she is (now) 82-years old, this indolent disease very well may never require any specific treatment again during her lifetime (although, even if it does, a number of options are available to us). We will see her back in clinic in six months (May 2023) for another wellness visit/symptom check. In the continued absence of a concerning clinical change (such as recurrent symptoms, etc), follow up imaging will continue to be repeated infrequently from this point.  2. Anemia: Multifactorial, and this issue is now resolved (HgB of 12.3 g/dL today). Iron deficiency status post surgery in mid-August 2021 was playing a large role; and this (the iron deficiency) is currently still corrected  (ferritin of 150.5 ng/mL today) on a normal diet alone. Continue to monitor.  The patient was in agreement with these plans.    It is a pleasure to participate in Ms. Justin's care. Please do not hesitate to call with any questions or concerns that you may have.    A total of 30 minutes were spent coordinating this patient’s care in clinic today; more than 50% of this time was face-to-face with the patient, reviewing her interim medical history, discussing the results of today's repeat labwork and counseling on the current followup plan. All questions were answered to her satisfaction.    FOLLOW UP  Return to our clinic in 6 months (~late May 2023) with a CBC and CMP.            This document was electronically signed by MYAH Vinson MD December 1, 2022 12:44 EST      CC: MD Shabnam Cline MD

## 2023-02-15 ENCOUNTER — HOSPITAL ENCOUNTER (OUTPATIENT)
Dept: BONE DENSITY | Facility: HOSPITAL | Age: 83
Discharge: HOME | End: 2023-02-15
Payer: MEDICARE

## 2023-02-15 ENCOUNTER — HOSPITAL ENCOUNTER (OUTPATIENT)
Dept: MAMMOGRAPHY | Facility: HOSPITAL | Age: 83
Discharge: HOME | End: 2023-02-15
Payer: MEDICARE

## 2023-02-15 DIAGNOSIS — Z12.31 VISIT FOR SCREENING MAMMOGRAM: ICD-10-CM

## 2023-02-15 DIAGNOSIS — M81.0 AGE-RELATED OSTEOPOROSIS WITHOUT CURRENT PATHOLOGICAL FRACTURE: ICD-10-CM

## 2023-02-15 PROCEDURE — 77063 BREAST TOMOSYNTHESIS BI: CPT

## 2023-02-15 PROCEDURE — 77080 DXA BONE DENSITY AXIAL: CPT | Performed by: RADIOLOGY

## 2023-02-15 PROCEDURE — 77080 DXA BONE DENSITY AXIAL: CPT

## 2023-02-15 PROCEDURE — 77067 SCR MAMMO BI INCL CAD: CPT | Performed by: RADIOLOGY

## 2023-02-15 PROCEDURE — 77063 BREAST TOMOSYNTHESIS BI: CPT | Performed by: RADIOLOGY

## 2023-02-15 PROCEDURE — 77067 SCR MAMMO BI INCL CAD: CPT

## 2023-06-01 ENCOUNTER — LAB (OUTPATIENT)
Dept: ONCOLOGY | Facility: CLINIC | Age: 83
End: 2023-06-01

## 2023-06-01 ENCOUNTER — OFFICE VISIT (OUTPATIENT)
Dept: ONCOLOGY | Facility: CLINIC | Age: 83
End: 2023-06-01

## 2023-06-01 VITALS
HEART RATE: 71 BPM | SYSTOLIC BLOOD PRESSURE: 132 MMHG | HEIGHT: 69 IN | OXYGEN SATURATION: 97 % | DIASTOLIC BLOOD PRESSURE: 67 MMHG | BODY MASS INDEX: 29.18 KG/M2 | RESPIRATION RATE: 18 BRPM | TEMPERATURE: 97.3 F | WEIGHT: 197 LBS

## 2023-06-01 DIAGNOSIS — C82.13 FOLLICULAR LYMPHOMA GRADE II OF INTRA-ABDOMINAL LYMPH NODES: ICD-10-CM

## 2023-06-01 DIAGNOSIS — C82.13 FOLLICULAR LYMPHOMA GRADE II OF INTRA-ABDOMINAL LYMPH NODES: Primary | ICD-10-CM

## 2023-06-01 LAB
ALBUMIN SERPL-MCNC: 4.1 G/DL (ref 3.5–5.2)
ALBUMIN/GLOB SERPL: 1.6 G/DL
ALP SERPL-CCNC: 143 U/L (ref 39–117)
ALT SERPL W P-5'-P-CCNC: 12 U/L (ref 1–33)
ANION GAP SERPL CALCULATED.3IONS-SCNC: 10.1 MMOL/L (ref 5–15)
AST SERPL-CCNC: 16 U/L (ref 1–32)
BASOPHILS # BLD AUTO: 0.03 10*3/MM3 (ref 0–0.2)
BASOPHILS NFR BLD AUTO: 0.5 % (ref 0–1.5)
BILIRUB SERPL-MCNC: 0.8 MG/DL (ref 0–1.2)
BUN SERPL-MCNC: 25 MG/DL (ref 8–23)
BUN/CREAT SERPL: 19.7 (ref 7–25)
CALCIUM SPEC-SCNC: 10.6 MG/DL (ref 8.6–10.5)
CHLORIDE SERPL-SCNC: 103 MMOL/L (ref 98–107)
CO2 SERPL-SCNC: 24.9 MMOL/L (ref 22–29)
CREAT SERPL-MCNC: 1.27 MG/DL (ref 0.57–1)
DEPRECATED RDW RBC AUTO: 46.4 FL (ref 37–54)
EGFRCR SERPLBLD CKD-EPI 2021: 41.8 ML/MIN/1.73
EOSINOPHIL # BLD AUTO: 0 10*3/MM3 (ref 0–0.4)
EOSINOPHIL NFR BLD AUTO: 0 % (ref 0.3–6.2)
ERYTHROCYTE [DISTWIDTH] IN BLOOD BY AUTOMATED COUNT: 13.2 % (ref 12.3–15.4)
GLOBULIN UR ELPH-MCNC: 2.6 GM/DL
GLUCOSE SERPL-MCNC: 213 MG/DL (ref 65–99)
HCT VFR BLD AUTO: 38.5 % (ref 34–46.6)
HGB BLD-MCNC: 12.3 G/DL (ref 12–15.9)
IMM GRANULOCYTES # BLD AUTO: 0.01 10*3/MM3 (ref 0–0.05)
IMM GRANULOCYTES NFR BLD AUTO: 0.2 % (ref 0–0.5)
LYMPHOCYTES # BLD AUTO: 1.22 10*3/MM3 (ref 0.7–3.1)
LYMPHOCYTES NFR BLD AUTO: 22.1 % (ref 19.6–45.3)
MCH RBC QN AUTO: 30.5 PG (ref 26.6–33)
MCHC RBC AUTO-ENTMCNC: 31.9 G/DL (ref 31.5–35.7)
MCV RBC AUTO: 95.5 FL (ref 79–97)
MONOCYTES # BLD AUTO: 0.53 10*3/MM3 (ref 0.1–0.9)
MONOCYTES NFR BLD AUTO: 9.6 % (ref 5–12)
NEUTROPHILS NFR BLD AUTO: 3.73 10*3/MM3 (ref 1.7–7)
NEUTROPHILS NFR BLD AUTO: 67.6 % (ref 42.7–76)
NRBC BLD AUTO-RTO: 0 /100 WBC (ref 0–0.2)
PLATELET # BLD AUTO: 150 10*3/MM3 (ref 140–450)
PMV BLD AUTO: 10.1 FL (ref 6–12)
POTASSIUM SERPL-SCNC: 4.5 MMOL/L (ref 3.5–5.2)
PROT SERPL-MCNC: 6.7 G/DL (ref 6–8.5)
RBC # BLD AUTO: 4.03 10*6/MM3 (ref 3.77–5.28)
SODIUM SERPL-SCNC: 138 MMOL/L (ref 136–145)
WBC NRBC COR # BLD: 5.52 10*3/MM3 (ref 3.4–10.8)

## 2023-06-01 PROCEDURE — 85025 COMPLETE CBC W/AUTO DIFF WBC: CPT | Performed by: INTERNAL MEDICINE

## 2023-06-01 PROCEDURE — 80053 COMPREHEN METABOLIC PANEL: CPT | Performed by: INTERNAL MEDICINE

## 2023-06-01 PROCEDURE — 99214 OFFICE O/P EST MOD 30 MIN: CPT | Performed by: INTERNAL MEDICINE

## 2023-06-01 PROCEDURE — 1126F AMNT PAIN NOTED NONE PRSNT: CPT | Performed by: INTERNAL MEDICINE

## 2023-06-01 RX ORDER — SEMAGLUTIDE 1.34 MG/ML
1 INJECTION, SOLUTION SUBCUTANEOUS
COMMUNITY
Start: 2023-03-24

## 2023-06-01 NOTE — PROGRESS NOTES
Venipuncture Blood Specimen Collection  Venipuncture performed in left arm by Joe Amador MA with good hemostasis. Patient tolerated the procedure well without complications.   06/01/23   Joe Amador MA

## 2023-06-01 NOTE — PROGRESS NOTES
"  Name:  Rachana Justin  :  1940  Date:  2023     REFERRING PHYSICIAN  Troy Elkins MD    PRIMARY CARE PHYSICIAN  Shabnam De La Rosa MD    REASON FOR FOLLOWUP  1. 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    Dear Troy,    HISTORY OF PRESENT ILLNESS:   I saw Ms. Justin in follow up today in our hematology/oncology clinic. As you are aware, she is a pleasant, 82 y.o., white female with a history of hypertension, diabetes and COPD who presented to our ED on 2021 for abdominal pain. CT scans identified a right lower quadrant, mesenteric mass. Follow up was arranged with you in your clinic. A NM PET scan on 2021 showed that, in the interim, this mass had \"resolved\" while one in the left lower abdomen had \"developed\" (which likely represented the same lymph node(s) changing position, due to mobility of the mesentery). You ultimately took her to the OR on 2021 for a diagnostic laparoscopy (which had to be converted to an open laparotomy due to the location of the lymphadenopathy at the mesenteric root). A couple of lymph nodes and a small segment of the proximal ileum were successfully excised, and the final pathology results were consistent with grade 2 follicular lymphoma. Though she developed a postoperative ileus and anemia, she was able to be discharged on 2021. She was subsequently referred to our clinic for further evaluation and management. Routine monitoring was, and remains, recommended.    INTERIM HISTORY:  Ms. Justin returns to clinic today for follow up by herself (her daughter did not come with her this time). Ms. Justin again denies any B-symptoms (fevers, chills, night sweats, unexplained weight loss). She has discontinued her PO iron, as previously instructed. She is eating normally, continues to feel overall well and again has no new or specific complaints.    Past Medical History:   Diagnosis Date   • Ambulates with cane  "   • Arthritis    • Atrial fibrillation    • COPD (chronic obstructive pulmonary disease)    • COVID-19 virus infection 2020   • Diabetes mellitus    • Gallstones    • GERD (gastroesophageal reflux disease)    • Heart disease    • Hyperlipidemia    • Hypertension    • Mesenteric lymphadenopathy 2021    Added automatically from request for surgery [id number]   • Pulmonary hypertension    • Status post laparoscopic cholecystectomy 3/9/2021       Past Surgical History:   Procedure Laterality Date   • CATARACT EXTRACTION Bilateral    • CHOLECYSTECTOMY N/A 3/26/2021    Procedure: CHOLECYSTECTOMY LAPAROSCOPIC (REQUESTS MARIEVL NWEMAN);  Surgeon: Keagan Lopez MD;  Location: [facility] OR;  Service: General;  Laterality: N/A;   • COLONOSCOPY N/A 2021    Procedure: COLONOSCOPY;  Surgeon: Troy Elkins MD;  Location: [facility] OR;  Service: Gastroenterology;  Laterality: N/A;   • DIAGNOSTIC LAPAROSCOPY N/A 2021    Procedure: DIAGNOSTIC LAPAROSCOPY CONVERTED TO  OPEN @ 0918 WITH SMALL BOWEL RESECTION;  Surgeon: Troy Elkins MD;  Location: [facility] OR;  Service: General;  Laterality: N/A;   • ENDOSCOPY N/A 2021    Procedure: ESOPHAGOGASTRODUODENOSCOPY WITH ANESTHESIA;  Surgeon: Troy Elkins MD;  Location: [facility] OR;  Service: Gastroenterology;  Laterality: N/A;   • FRACTURE SURGERY      right foot orif   • PACEMAKER IMPLANTATION Left 2018       Social History     Socioeconomic History   • Marital status:    Tobacco Use   • Smoking status: Former     Packs/day: 0.50     Years: 10.00     Pack years: 5.00     Types: Cigarettes     Quit date: 3/1/1963     Years since quittin.2   • Smokeless tobacco: Never   Vaping Use   • Vaping Use: Never used   Substance and Sexual Activity   • Alcohol use: No   • Drug use: Never   • Sexual activity: Defer       Family History   Problem Relation Age of Onset   • Breast cancer Sister    • Breast cancer Sister    • Breast cancer Sister          40's   • Cancer Mother        Allergies   Allergen Reactions   • Nitrofurantoin GI Intolerance       Current Outpatient Medications   Medication Sig Dispense Refill   • acetaminophen (TYLENOL) 325 MG tablet Take 2 tablets by mouth Every 4 (Four) Hours As Needed for Mild Pain . 30 tablet 0   • amLODIPine (NORVASC) 5 MG tablet Take 1 tablet by mouth Daily. 30 tablet 3   • apixaban (ELIQUIS) 5 MG tablet tablet Take 1 tablet by mouth 2 (Two) Times a Day. ON HOLD PER DR CEJA     • aspirin 81 MG chewable tablet Chew 1 tablet Daily.     • atorvastatin (LIPITOR) 10 MG tablet Take 1 tablet by mouth Every Night. 30 tablet 3   • bumetanide (BUMEX) 1 MG tablet Take 1 tablet by mouth Daily.     • carvedilol (COREG) 12.5 MG tablet Take 1 tablet by mouth 2 (Two) Times a Day With Meals.     • docusate sodium (Colace) 100 MG capsule Take 1 capsule by mouth 2 (Two) Times a Day As Needed for Constipation. 60 capsule 0   • ferrous sulfate 325 (65 FE) MG tablet Take 1 tablet by mouth 2 (two) times a day. 60 tablet 2   • fesoterodine fumarate (TOVIAZ ER) 8 MG tablet sustained-release 24 hour tablet Take 1 tablet by mouth Daily.     • furosemide (LASIX) 40 MG tablet Take 1 tablet by mouth 3 (Three) Times a Week if Needed (FLUID RETENTION).     • HYDROcodone-acetaminophen (NORCO) 5-325 MG per tablet Take 1 tablet by mouth Every 8 (Eight) Hours As Needed for Severe Pain . 8 tablet 0   • Insulin Glargine, 1 Unit Dial, (TOUJEO) 300 UNIT/ML solution pen-injector injection Inject 50 Units under the skin into the appropriate area as directed Daily.     • losartan (COZAAR) 50 MG tablet Take 1 tablet by mouth Daily.     • omeprazole (priLOSEC) 40 MG capsule Take 1 capsule by mouth Daily As Needed (stomach).     • potassium chloride ER (K-TAB) 20 MEQ tablet controlled-release ER tablet Take 1 tablet by mouth 3 (Three) Times a Week. Prior to [facility] Admission, Patient was on: only takes when she takes Furosemide     • Semaglutide, 1 MG/DOSE,  "(Ozempic, 1 MG/DOSE,) 4 MG/3ML solution pen-injector Inject 1 mg under the skin into the appropriate area as directed Every 7 (Seven) Days.     • spironolactone (ALDACTONE) 25 MG tablet Take 1 tablet by mouth Daily.     • vitamin B-12 (CYANOCOBALAMIN) 1000 MCG tablet Take 1 tablet by mouth Daily.     • vitamin D (ERGOCALCIFEROL) 1.25 MG (78814 UT) capsule capsule Take 1 capsule by mouth 1 (One) Time Per Week.       No current facility-administered medications for this visit.     REVIEW OF SYSTEMS  CONSTITUTIONAL:  No fever, chills or night sweats. Improved fatigue compared to last year.  EYES:  No blurry vision, diplopia or other vision changes.  ENT:  No hearing loss, nosebleeds or sore throat.  CARDIOVASCULAR:  No palpitations, arrhythmia, syncopal episodes or edema.  PULMONARY:  No hemoptysis, wheezing, chronic cough or shortness of breath.  GASTROINTESTINAL:  No nausea or vomiting. No constipation or diarrhea. No abdominal pain.  GENITOURINARY:  No hematuria, kidney stones or frequent urination.  MUSCULOSKELETAL:  No joint or back pains.  INTEGUMENTARY: No rashes or pruritus.  ENDOCRINE:  No excessive thirst or hot flashes.  HEMATOLOGIC:  No history of free bleeding, spontaneous bleeding or clotting.  IMMUNOLOGIC:  No allergies or frequent infections.  NEUROLOGIC: No numbness, tingling, seizures or weakness.  PSYCHIATRIC:  No anxiety or depression.    PHYSICAL EXAMINATION  /67   Pulse 71   Temp 97.3 °F (36.3 °C) (Temporal)   Resp 18   Ht 175.3 cm (69\")   Wt 89.4 kg (197 lb)   SpO2 97%   BMI 29.09 kg/m²     Pain Score:  Pain Score    23 0911   PainSc: 0-No pain     PHQ-Score Total:  PHQ-9 Total Score:      ECO  GENERAL:  A well-developed, well-nourished, elderly, white female in no acute distress, still ambulating with the assistance of a cane.  HEENT:  Pupils equally round and reactive to light. Extraocular muscles intact.  CARDIOVASCULAR:  Regular rate and rhythm. No murmurs, gallops or " rubs.  LUNGS:  Clear to auscultation bilaterally.  ABDOMEN:  Soft, nontender, nondistended with positive bowel sounds.  EXTREMITIES:  No clubbing or cyanosis bilaterally. Stable, ~1-2+ edema in the bilateral lower extremities.  SKIN:  No rashes or petechiae.  NEURO:  Cranial nerves grossly intact. No focal deficits.  PSYCH:  Alert and oriented x3.    The physical exam is unchanged from the previous one.    LABORATORY  Lab Results   Component Value Date    WBC 5.52 06/01/2023    HGB 12.3 06/01/2023    HCT 38.5 06/01/2023    MCV 95.5 06/01/2023     06/01/2023    NEUTROABS 3.73 06/01/2023       Lab Results   Component Value Date     12/01/2022    K 4.0 12/01/2022     12/01/2022    CO2 22.8 12/01/2022    BUN 19 12/01/2022    CREATININE 1.01 (H) 12/01/2022    GLUCOSE 269 (H) 12/01/2022    CALCIUM 9.6 12/01/2022    AST 18 12/01/2022    ALT 6 12/01/2022    ALKPHOS 153 (H) 12/01/2022    BILITOT 1.0 12/01/2022    PROTEINTOT 6.8 12/01/2022    ALBUMIN 3.94 12/01/2022     CBC (06/01/2023): WBCs: 5.52; HgB: 12.3; Hct: 38.5; platelets: 150  CBC (12/01/2022): WBCs: 7.09; HgB: 12.3; Hct: 38.5; platelets: 163  CBC (06/01/2022): WBCs: 6.29; HgB: 11.0; Hct: 34.8; platelets: 223  CBC (02/21/2022): WBCs: 5.31; HgB: 10.9; Hct: 34.7; platelets: 142  CBC (10/27/2021): WBCs: 5.41; HgB: 9.9; Hct: 33.3; platelets: 183  CBC (10/06/2021): WBCs: 4.85; HgB: 8.7; Hct: 29.8; platelets: 184  CBC (08/18/2021): WBCs: 6.65; HgB: 7.5; Hct: 24.5; platelets: 173    Iron profile (12/01/2022): serum iron: 75; % saturation: 20; TIBC: 377; ferritin: 150.5 ng/mL  Iron profile (06/01/2022): serum iron: 64; % saturation: 19; TIBC: 341; ferritin: 176.6 ng/mL  Iron profile (02/21/2022): serum iron: 58; % saturation: 15; TIBC: 380; ferritin: 87.74 ng/mL  Iron profile (10/27/2021): serum iron: 44; % saturation: 9; TIBC: 514; ferritin: 57.67 ng/mL    IMAGING  CT abdomen and pelvis without contrast (05/29/2021):  Impression:  1) Adenopathy in the  right lower quadrant mesentery concerning for malignancy. There is some adjacent inflammatory fat stranding with one of the nodes. Consider PET/CT for followup.  2) Mild thickening of the rectosigmoid colon could be due to incomplete distention or mild segmental colitis. There is colonic diverticulosis without focal diverticulitis.  3) Grossly normal small bowel and appendix.  4) Cholecystectomy with some mild fat stranding in the gallbladder fossa. This could be due to relatively recent surgery or some mild inflammation. No fluid collection.  5) Additional findings [are nonacute].    NM PET with fusion CT, skull base to mid-thigh (06/14/2021, compared to CT of the abdomen/pelvis dated 05/29/2021):  Impression:  1) Significant interval improvement in the appearance of the large right lower quadrant soft tissue masses. Residual nodules are present but they do not show FDG avidity.  2) Interval development of a large, soft tissue nodule in the left lower quadrant which does show hypermetabolic activity.    CT soft tissue neck with contrast (10/22/2021):  Impression: No cervical lymphadenopathy or evidence of lymphomatous involvement in the neck.    CT chest with contrast (10/22/2021):  Impression: Stable chest. No findings in the CT of the chest to suggest lymphoma.    CT abdomen and pelvis with contrast (10/22/2021):  Impression: The hypermetabolic soft tissue mass in the left abdomen on the PET fusion CT is not demonstrable on today's CT. There is fatty change in the liver with a small amount of ascites around the liver. There are prominent lymph nodes along the iliac lymph node chains in the pelvis that are in the 1 cm size range.    CT abdomen and pelvis without contrast (05/18/2022, compared to 10/22/2021):  Impression:  1) The heart is enlarged.  2) No enlarged retroperitoneal or mesenteric lymph nodes at this time. No evidence of residual or recurrent lymphoma.  3) Fat-containing umbilical hernia with a  diameter of 3.5 cm.    PATHOLOGY  Lymph node, small bowel mesenteric #1 (08/12/2021):  Follicular lymphoma, grade 2.    Lymph node, small bowel mesenteric #2 (08/12/2021):  Follicular lymphoma, grade 2.    Small bowel segment, proximal ileum (08/12/2021):  Follicular lymphoma, grade 2. Small bowel also involved by follicular lymphoma. Viable surgical margins.    IMPRESSION AND PLAN  Ms. Justin is an 82 y.o., white female with:  Follicular lymphoma: Diagnosed in midsummer 2021 with, technically, stage IV (a solid organ, the small bowel, was involved), but now fully (or at least mostly) resected disease. I have had multiple, long discussions with the patient (+/- her daughter) since the time of her initial consultation in our clinic (on 10/06/2021) regarding this diagnosis and, in general terms, its prognosis. The postoperative CT scans of the neck, chest, abdomen and pelvis (performed on 10/22/2021 and summarized above) showed no evidence of active lymphoma (a few, prominent, but only ~1 cm in size, lymph nodes in the iliac chains were the only notable findings; there was/is no evidence of any adenopathy at all anywhere else); and this continues to be the case on the most recent repeat imaging (a CT of the abdomen and pelvis performed on 05/18/2022 and also summarized above). Given this, continued, expectant monitoring alone remains recommended. Particularly since she is (now) 82-years old, this indolent disease very well may never require any specific treatment again during her lifetime (although, even if it does, a number of options are available to us). We will see her back in clinic in six months (November 2023) for another wellness visit/symptom check. In the continued absence of a concerning clinical change (such as recurrent symptoms, etc), follow up imaging will continue to be repeated infrequently from this point. The patient was in agreement with these plans.    It is a pleasure to participate in Ms. Justin's  care. Please do not hesitate to call with any questions or concerns that you may have.    A total of 30 minutes were spent coordinating this patient’s care in clinic today; more than 50% of this time was face-to-face with the patient, reviewing her interim medical history, discussing the results of today's repeat labs and counseling on the current followup plan. All questions were answered to her satisfaction.    FOLLOW UP  Return to our clinic in 6 months (~late November 2023) with a CBC and CMP.            This document was electronically signed by MYAH Vinson MD June 1, 2023 09:26 EDT      CC: MD Shabnam Cline MD

## 2023-06-26 NOTE — OUTREACH NOTE
Medical Week 1 Survey      Responses   [facility] patient discharged from?  [facility]   Does the patient have one of the following disease processes/diagnoses(primary or secondary)?  Other   Week 1 attempt successful?  Yes   Call start time  1404   Call end time  1409   Is patient permission given to speak with other caregiver?  Yes   Person spoke with today (if not patient) and relationship  Garth-spouse   Meds reviewed with patient/caregiver?  Yes   Is the patient having any side effects they believe may be caused by any medication additions or changes?  No   Does the patient have all medications ordered at discharge?  Yes   Is the patient taking all medications as directed (includes completed medication regime)?  Yes   Comments regarding appointments  Currently at surgeon's office for appt.   Does the patient have a primary care provider?   Yes   Does the patient have an appointment with their PCP within 7 days of discharge?  Yes   Has the patient kept scheduled appointments due by today?  Yes   Has home health visited the patient within 72 hours of discharge?  N/A   Psychosocial issues?  No   Did the patient receive a copy of their discharge instructions?  Yes   Nursing interventions  Reviewed instructions with patient   What is the patient's perception of their health status since discharge?  Improving   Is the patient/caregiver able to teach back signs and symptoms related to disease process for when to call PCP?  Yes   Is the patient/caregiver able to teach back signs and symptoms related to disease process for when to call 911?  Yes   Is the patient/caregiver able to teach back the hierarchy of who to call/visit for symptoms/problems? PCP, Specialist, Home health nurse, Urgent Care, ED, 911  Yes   If the patient is a current smoker, are they able to teach back resources for cessation?  Not a smoker   Additional teach back comments  Reviewed s/s of infection at surgical site.   Week 1 call completed?  Yes  Patient notified at time of visit.  No further action needed.       Wrap up additional comments  Spouse states patient is doing well-denies any s/s of infection at incision. States appetite good-denies any problems with BMs. Denies any needs today.          Alyssa Cornejo RN

## 2023-08-03 ENCOUNTER — HOSPITAL ENCOUNTER (EMERGENCY)
Facility: HOSPITAL | Age: 83
Discharge: HOME | End: 2023-08-03
Attending: EMERGENCY MEDICINE
Payer: MEDICARE

## 2023-08-03 ENCOUNTER — APPOINTMENT (OUTPATIENT)
Dept: GENERAL RADIOLOGY | Facility: HOSPITAL | Age: 83
End: 2023-08-03
Payer: MEDICARE

## 2023-08-03 VITALS
HEART RATE: 70 BPM | RESPIRATION RATE: 16 BRPM | OXYGEN SATURATION: 98 % | DIASTOLIC BLOOD PRESSURE: 82 MMHG | BODY MASS INDEX: 29.18 KG/M2 | HEIGHT: 69 IN | SYSTOLIC BLOOD PRESSURE: 157 MMHG | WEIGHT: 197 LBS | TEMPERATURE: 97.7 F

## 2023-08-03 DIAGNOSIS — R07.9 CHEST PAIN, UNSPECIFIED TYPE: Primary | ICD-10-CM

## 2023-08-03 DIAGNOSIS — B02.9 HERPES ZOSTER WITHOUT COMPLICATION: ICD-10-CM

## 2023-08-03 LAB
ALBUMIN SERPL-MCNC: 4.3 G/DL (ref 3.5–5.2)
ALBUMIN/GLOB SERPL: 1.6 G/DL
ALP SERPL-CCNC: 110 U/L (ref 39–117)
ALT SERPL W P-5'-P-CCNC: 12 U/L (ref 1–33)
ANION GAP SERPL CALCULATED.3IONS-SCNC: 11.2 MMOL/L (ref 5–15)
APTT PPP: 39.3 SECONDS (ref 26.5–34.5)
AST SERPL-CCNC: 15 U/L (ref 1–32)
BASOPHILS # BLD AUTO: 0.04 10*3/MM3 (ref 0–0.2)
BASOPHILS NFR BLD AUTO: 0.6 % (ref 0–1.5)
BILIRUB SERPL-MCNC: 1.2 MG/DL (ref 0–1.2)
BUN SERPL-MCNC: 18 MG/DL (ref 8–23)
BUN/CREAT SERPL: 17 (ref 7–25)
CALCIUM SPEC-SCNC: 10.2 MG/DL (ref 8.6–10.5)
CHLORIDE SERPL-SCNC: 104 MMOL/L (ref 98–107)
CO2 SERPL-SCNC: 22.8 MMOL/L (ref 22–29)
CREAT SERPL-MCNC: 1.06 MG/DL (ref 0.57–1)
DEPRECATED RDW RBC AUTO: 43.8 FL (ref 37–54)
EGFRCR SERPLBLD CKD-EPI 2021: 51.9 ML/MIN/1.73
EOSINOPHIL # BLD AUTO: 0.03 10*3/MM3 (ref 0–0.4)
EOSINOPHIL NFR BLD AUTO: 0.5 % (ref 0.3–6.2)
ERYTHROCYTE [DISTWIDTH] IN BLOOD BY AUTOMATED COUNT: 13.2 % (ref 12.3–15.4)
FLUAV RNA RESP QL NAA+PROBE: NOT DETECTED
FLUBV RNA RESP QL NAA+PROBE: NOT DETECTED
GEN 5 2HR TROPONIN T REFLEX: 27 NG/L
GLOBULIN UR ELPH-MCNC: 2.7 GM/DL
GLUCOSE SERPL-MCNC: 218 MG/DL (ref 65–99)
HCT VFR BLD AUTO: 39.2 % (ref 34–46.6)
HGB BLD-MCNC: 12.5 G/DL (ref 12–15.9)
HOLD SPECIMEN: NORMAL
HOLD SPECIMEN: NORMAL
IMM GRANULOCYTES # BLD AUTO: 0.02 10*3/MM3 (ref 0–0.05)
IMM GRANULOCYTES NFR BLD AUTO: 0.3 % (ref 0–0.5)
INR PPP: 1.43 (ref 0.9–1.1)
LIPASE SERPL-CCNC: 28 U/L (ref 13–60)
LYMPHOCYTES # BLD AUTO: 1.78 10*3/MM3 (ref 0.7–3.1)
LYMPHOCYTES NFR BLD AUTO: 28.7 % (ref 19.6–45.3)
MAGNESIUM SERPL-MCNC: 1.8 MG/DL (ref 1.6–2.4)
MCH RBC QN AUTO: 29.5 PG (ref 26.6–33)
MCHC RBC AUTO-ENTMCNC: 31.9 G/DL (ref 31.5–35.7)
MCV RBC AUTO: 92.5 FL (ref 79–97)
MONOCYTES # BLD AUTO: 0.53 10*3/MM3 (ref 0.1–0.9)
MONOCYTES NFR BLD AUTO: 8.5 % (ref 5–12)
NEUTROPHILS NFR BLD AUTO: 3.8 10*3/MM3 (ref 1.7–7)
NEUTROPHILS NFR BLD AUTO: 61.4 % (ref 42.7–76)
NRBC BLD AUTO-RTO: 0 /100 WBC (ref 0–0.2)
NT-PROBNP SERPL-MCNC: 2785 PG/ML (ref 0–1800)
PLATELET # BLD AUTO: 174 10*3/MM3 (ref 140–450)
PMV BLD AUTO: 10.4 FL (ref 6–12)
POTASSIUM SERPL-SCNC: 4.3 MMOL/L (ref 3.5–5.2)
PROT SERPL-MCNC: 7 G/DL (ref 6–8.5)
PROTHROMBIN TIME: 18 SECONDS (ref 12.1–14.7)
QT INTERVAL: 460 MS
QTC INTERVAL: 496 MS
RBC # BLD AUTO: 4.24 10*6/MM3 (ref 3.77–5.28)
SARS-COV-2 RNA RESP QL NAA+PROBE: NOT DETECTED
SODIUM SERPL-SCNC: 138 MMOL/L (ref 136–145)
TROPONIN T DELTA: -2 NG/L
TROPONIN T SERPL HS-MCNC: 29 NG/L
WBC NRBC COR # BLD: 6.2 10*3/MM3 (ref 3.4–10.8)
WHOLE BLOOD HOLD COAG: NORMAL
WHOLE BLOOD HOLD SPECIMEN: NORMAL

## 2023-08-03 PROCEDURE — 71045 X-RAY EXAM CHEST 1 VIEW: CPT | Performed by: RADIOLOGY

## 2023-08-03 PROCEDURE — 93005 ELECTROCARDIOGRAM TRACING: CPT | Performed by: PHYSICIAN ASSISTANT

## 2023-08-03 PROCEDURE — 83735 ASSAY OF MAGNESIUM: CPT | Performed by: PHYSICIAN ASSISTANT

## 2023-08-03 PROCEDURE — 83690 ASSAY OF LIPASE: CPT | Performed by: PHYSICIAN ASSISTANT

## 2023-08-03 PROCEDURE — 85025 COMPLETE CBC W/AUTO DIFF WBC: CPT | Performed by: PHYSICIAN ASSISTANT

## 2023-08-03 PROCEDURE — 83880 ASSAY OF NATRIURETIC PEPTIDE: CPT | Performed by: PHYSICIAN ASSISTANT

## 2023-08-03 PROCEDURE — 85730 THROMBOPLASTIN TIME PARTIAL: CPT | Performed by: PHYSICIAN ASSISTANT

## 2023-08-03 PROCEDURE — 85610 PROTHROMBIN TIME: CPT | Performed by: PHYSICIAN ASSISTANT

## 2023-08-03 PROCEDURE — 87636 SARSCOV2 & INF A&B AMP PRB: CPT | Performed by: PHYSICIAN ASSISTANT

## 2023-08-03 PROCEDURE — 36415 COLL VENOUS BLD VENIPUNCTURE: CPT

## 2023-08-03 PROCEDURE — 99284 EMERGENCY DEPT VISIT MOD MDM: CPT

## 2023-08-03 PROCEDURE — 93010 ELECTROCARDIOGRAM REPORT: CPT | Performed by: SPECIALIST

## 2023-08-03 PROCEDURE — 80053 COMPREHEN METABOLIC PANEL: CPT | Performed by: PHYSICIAN ASSISTANT

## 2023-08-03 PROCEDURE — 71045 X-RAY EXAM CHEST 1 VIEW: CPT

## 2023-08-03 PROCEDURE — 84484 ASSAY OF TROPONIN QUANT: CPT | Performed by: PHYSICIAN ASSISTANT

## 2023-08-03 RX ORDER — ISOSORBIDE MONONITRATE 30 MG/1
30 TABLET, EXTENDED RELEASE ORAL DAILY
Qty: 30 TABLET | Refills: 0 | Status: SHIPPED | OUTPATIENT
Start: 2023-08-03

## 2023-08-03 RX ORDER — SODIUM CHLORIDE 0.9 % (FLUSH) 0.9 %
10 SYRINGE (ML) INJECTION AS NEEDED
Status: DISCONTINUED | OUTPATIENT
Start: 2023-08-03 | End: 2023-08-03 | Stop reason: HOSPADM

## 2023-08-03 RX ORDER — ASPIRIN 81 MG/1
324 TABLET, CHEWABLE ORAL ONCE
Status: COMPLETED | OUTPATIENT
Start: 2023-08-03 | End: 2023-08-03

## 2023-08-03 RX ADMIN — ASPIRIN 324 MG: 81 TABLET, CHEWABLE ORAL at 13:59

## 2023-08-03 NOTE — ED PROVIDER NOTES
Subjective   History of Present Illness  82-year-old female who presents to the ED today for chest pain.  She states that she has been having a left-sided chest pressure intermittently for about 1 week.  She states it radiates into her back.  She states nothing seems to make it better or worse.  She states sometimes the symptoms do seem to be worse at night compared to the daytime.  She states it lasts a few minutes and then goes away.  She reports that she has had a shingles rash on her left upper back for about 3 weeks.  She was seen for this by her primary care provider about a week ago and started on medications.  She states the rash has been getting better.  She went back to her primary care provider today and they talked about her chest pain.  She states an EKG was performed and she was told there were some abnormalities and it was recommended that she come to the ER.  She denies any chest pain at this time.  She has not had any aspirin today.  She states she does feel short of breath at times.  She denies any nausea or vomiting.  She reports that she does have a pacemaker and it was placed about 6 years ago.  She is currently on Eliquis due to a history of atrial fibrillation.    History provided by:  Patient  Chest Pain  Pain location:  L chest  Pain quality: pressure    Pain radiates to:  Upper back  Pain severity:  Moderate  Onset quality:  Gradual  Duration:  1 week  Timing:  Intermittent  Progression:  Waxing and waning  Chronicity:  New  Relieved by:  Nothing  Worsened by:  Nothing  Associated symptoms: back pain and shortness of breath    Associated symptoms: no abdominal pain, no altered mental status, no anorexia, no anxiety, no cough, no diaphoresis, no dizziness, no dysphagia, no fatigue, no fever, no headache, no heartburn, no lower extremity edema, no nausea, no near-syncope, no palpitations, no syncope, no vomiting and no weakness    Risk factors: diabetes mellitus, high cholesterol and  hypertension      Review of Systems   Constitutional: Negative.  Negative for diaphoresis, fatigue and fever.   HENT: Negative.  Negative for trouble swallowing.    Eyes: Negative.    Respiratory:  Positive for shortness of breath. Negative for cough and wheezing.    Cardiovascular:  Positive for chest pain. Negative for palpitations, leg swelling, syncope and near-syncope.   Gastrointestinal: Negative.  Negative for abdominal pain, anorexia, heartburn, nausea and vomiting.   Genitourinary: Negative.    Musculoskeletal:  Positive for back pain.   Skin:  Positive for rash.   Neurological:  Negative for dizziness, weakness and headaches.   Psychiatric/Behavioral: Negative.     All other systems reviewed and are negative.    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;  Service: General;  Laterality: N/A;    COLONOSCOPY N/A 7/14/2021    Procedure: COLONOSCOPY;  Surgeon: Troy Elkins MD;  Location: [facility];  Service: Gastroenterology;  Laterality: N/A;    DIAGNOSTIC LAPAROSCOPY N/A 8/12/2021    Procedure: DIAGNOSTIC LAPAROSCOPY CONVERTED TO  OPEN @ 0918 WITH SMALL BOWEL RESECTION;  Surgeon: Troy Elkins MD;  Location: [facility];  Service:  General;  Laterality: N/A;    ENDOSCOPY N/A 2021    Procedure: ESOPHAGOGASTRODUODENOSCOPY WITH ANESTHESIA;  Surgeon: Troy Elkins MD;  Location: [facility];  Service: Gastroenterology;  Laterality: N/A;    FRACTURE SURGERY      right foot orif    PACEMAKER IMPLANTATION Left 2018       Family History   Problem Relation Age of Onset    Breast cancer Sister     Breast cancer Sister     Breast cancer Sister         40's    Cancer Mother        Social History     Socioeconomic History    Marital status:    Tobacco Use    Smoking status: Former     Packs/day: 0.50     Years: 10.00     Pack years: 5.00     Types: Cigarettes     Quit date: 3/1/1963     Years since quittin.4    Smokeless tobacco: Never   Vaping Use    Vaping Use: Never used   Substance and Sexual Activity    Alcohol use: No    Drug use: Never    Sexual activity: Defer           Objective   Physical Exam  Vitals and nursing note reviewed.   Constitutional:       General: She is not in acute distress.     Appearance: She is well-developed. She is not diaphoretic.   HENT:      Head: Normocephalic and atraumatic.   Eyes:      Extraocular Movements: Extraocular movements intact.      Pupils: Pupils are equal, round, and reactive to light.   Neck:      Vascular: No JVD.      Trachea: No tracheal deviation.   Cardiovascular:      Rate and Rhythm: Normal rate and regular rhythm.      Heart sounds: Normal heart sounds.   Pulmonary:      Effort: Pulmonary effort is normal.      Breath sounds: Normal breath sounds.   Chest:      Chest wall: No tenderness.   Abdominal:      General: Bowel sounds are normal.      Palpations: Abdomen is soft.      Tenderness: There is no abdominal tenderness.   Musculoskeletal:         General: Normal range of motion.      Cervical back: Normal range of motion and neck supple.   Lymphadenopathy:      Cervical: No cervical adenopathy.   Skin:     General: Skin is warm and dry.      Capillary Refill: Capillary  refill takes less than 2 seconds.      Findings: Rash (healing rash noted to left upper back consistent with recent shingles) present.   Neurological:      General: No focal deficit present.      Mental Status: She is alert and oriented to person, place, and time.   Psychiatric:         Mood and Affect: Mood normal.       Procedures           ED Course  ED Course as of 08/03/23 1913   u Aug 03, 2023   1345 ECG 13:40 Ventricular-paced rhythm, rate 70. QT/qTc 460/496 [KUMAR]   1528 XR Chest 1 View  IMPRESSION:  No radiographic evidence of acute cardiac or pulmonary disease.   []   1613 Endorsed to Parveen Cary []   1625 Patient reports having intermittent episodes of discomfort in her chest.  She states these usually last a few seconds.  She states this has been intermittent over the past week.  No relieving or exacerbating factors.  Patient does have shingles on the left side.  She states that a lot of her pain is in her arm.  She states that her shingles are drying up which appears to be the case.  No sign of secondary cellulitis.  Patient denies any exertional chest pressure tightness or squeezing.  She states that at times she will have an aching in her shoulder that last several hours.  She last had a stress test in 2018.  This was normal at the time.  She is not on any long-acting nitrates.  She reports that she feels short of breath at times when these episodes occur.  She was given the option of being admitted to the hospital versus outpatient work-up and following up with Dr. Gibbs.  She elected to follow-up with Dr. Gibbs.  She is to return with any worsening of symptoms.  We will arrange for an outpatient stress test. [JI]      ED Course User Index  [AH] Nolvia Dallas PA  [JI] Dejuan Cary PA  [KUMAR] Jonathan Jackson MD           Results for orders placed or performed during the hospital encounter of 08/03/23   COVID-19 and FLU A/B PCR - Swab, Nasopharynx    Specimen: Nasopharynx; Swab   Result Value  Ref Range    COVID19 Not Detected Not Detected - Ref. Range    Influenza A PCR Not Detected Not Detected    Influenza B PCR Not Detected Not Detected   Comprehensive Metabolic Panel    Specimen: Arm, Right; Blood   Result Value Ref Range    Glucose 218 (H) 65 - 99 mg/dL    BUN 18 8 - 23 mg/dL    Creatinine 1.06 (H) 0.57 - 1.00 mg/dL    Sodium 138 136 - 145 mmol/L    Potassium 4.3 3.5 - 5.2 mmol/L    Chloride 104 98 - 107 mmol/L    CO2 22.8 22.0 - 29.0 mmol/L    Calcium 10.2 8.6 - 10.5 mg/dL    Total Protein 7.0 6.0 - 8.5 g/dL    Albumin 4.3 3.5 - 5.2 g/dL    ALT (SGPT) 12 1 - 33 U/L    AST (SGOT) 15 1 - 32 U/L    Alkaline Phosphatase 110 39 - 117 U/L    Total Bilirubin 1.2 0.0 - 1.2 mg/dL    Globulin 2.7 gm/dL    A/G Ratio 1.6 g/dL    BUN/Creatinine Ratio 17.0 7.0 - 25.0    Anion Gap 11.2 5.0 - 15.0 mmol/L    eGFR 51.9 (L) >60.0 mL/min/1.73   Protime-INR    Specimen: Arm, Right; Blood   Result Value Ref Range    Protime 18.0 (H) 12.1 - 14.7 Seconds    INR 1.43 (H) 0.90 - 1.10   aPTT    Specimen: Arm, Right; Blood   Result Value Ref Range    PTT 39.3 (H) 26.5 - 34.5 seconds   Lipase    Specimen: Arm, Right; Blood   Result Value Ref Range    Lipase 28 13 - 60 U/L   BNP    Specimen: Arm, Right; Blood   Result Value Ref Range    proBNP 2,785.0 (H) 0.0 - 1,800.0 pg/mL   High Sensitivity Troponin T    Specimen: Arm, Right; Blood   Result Value Ref Range    HS Troponin T 29 (H) <10 ng/L   Magnesium    Specimen: Arm, Right; Blood   Result Value Ref Range    Magnesium 1.8 1.6 - 2.4 mg/dL   CBC Auto Differential    Specimen: Arm, Right; Blood   Result Value Ref Range    WBC 6.20 3.40 - 10.80 10*3/mm3    RBC 4.24 3.77 - 5.28 10*6/mm3    Hemoglobin 12.5 12.0 - 15.9 g/dL    Hematocrit 39.2 34.0 - 46.6 %    MCV 92.5 79.0 - 97.0 fL    MCH 29.5 26.6 - 33.0 pg    MCHC 31.9 31.5 - 35.7 g/dL    RDW 13.2 12.3 - 15.4 %    RDW-SD 43.8 37.0 - 54.0 fl    MPV 10.4 6.0 - 12.0 fL    Platelets 174 140 - 450 10*3/mm3    Neutrophil % 61.4 42.7 -  76.0 %    Lymphocyte % 28.7 19.6 - 45.3 %    Monocyte % 8.5 5.0 - 12.0 %    Eosinophil % 0.5 0.3 - 6.2 %    Basophil % 0.6 0.0 - 1.5 %    Immature Grans % 0.3 0.0 - 0.5 %    Neutrophils, Absolute 3.80 1.70 - 7.00 10*3/mm3    Lymphocytes, Absolute 1.78 0.70 - 3.10 10*3/mm3    Monocytes, Absolute 0.53 0.10 - 0.90 10*3/mm3    Eosinophils, Absolute 0.03 0.00 - 0.40 10*3/mm3    Basophils, Absolute 0.04 0.00 - 0.20 10*3/mm3    Immature Grans, Absolute 0.02 0.00 - 0.05 10*3/mm3    nRBC 0.0 0.0 - 0.2 /100 WBC   High Sensitivity Troponin T 2Hr    Specimen: Blood   Result Value Ref Range    HS Troponin T 27 (H) <10 ng/L    Troponin T Delta -2 >=-4 - <+4 ng/L   ECG 12 Lead Chest Pain   Result Value Ref Range    QT Interval 460 ms    QTC Interval 496 ms   Green Top (Gel)   Result Value Ref Range    Extra Tube Hold for add-ons.    Lavender Top   Result Value Ref Range    Extra Tube hold for add-on    Gold Top - SST   Result Value Ref Range    Extra Tube Hold for add-ons.    Light Blue Top   Result Value Ref Range    Extra Tube Hold for add-ons.      XR Chest 1 View    Result Date: 8/3/2023  No radiographic evidence of acute cardiac or pulmonary disease.  This report was finalized on 8/3/2023 2:54 PM by Dr. Silvestre Khalil MD.                  HEART Score: 5                      Medical Decision Making  82-year-old female who presents to the ED today for chest pain.  She states that she has been having a left-sided chest pressure intermittently for about 1 week.  She states it radiates into her back.  She states nothing seems to make it better or worse.  She states sometimes the symptoms do seem to be worse at night compared to the daytime.  She states it lasts a few minutes and then goes away.  She reports that she has had a shingles rash on her left upper back for about 3 weeks.  She was seen for this by her primary care provider about a week ago and started on medications.  She states the rash has been getting better.  She went  back to her primary care provider today and they talked about her chest pain.  She states an EKG was performed and she was told there were some abnormalities and it was recommended that she come to the ER.  She denies any chest pain at this time.  She has not had any aspirin today.  She states she does feel short of breath at times.  She denies any nausea or vomiting.  She reports that she does have a pacemaker and it was placed about 6 years ago.  She is currently on Eliquis due to a history of atrial fibrillation.    Problems Addressed:  Chest pain, unspecified type: complicated acute illness or injury  Herpes zoster without complication: complicated acute illness or injury    Amount and/or Complexity of Data Reviewed  Labs: ordered. Decision-making details documented in ED Course.  Radiology: ordered. Decision-making details documented in ED Course.  ECG/medicine tests: ordered.    Risk  OTC drugs.  Prescription drug management.  Risk Details: Patient was asymptomatic discharge.  She was given the option of staying in the hospital versus outpatient work-up.  She preferred work-up with Dr. Gibbs.  Patient was counseled on the signs and symptoms worsening appropriate follow-up.  She was counseled at her diagnostic work-up and labs.  She voices understanding        Final diagnoses:   Chest pain, unspecified type   Herpes zoster without complication   Electronically signed by AQUILINO Vaughan, 08/03/23, 4:13 PM EDT.     ED Disposition  ED Disposition       ED Disposition   Discharge    Condition   Stable    Comment   --               Shabnam De La Rosa MD  [address] [facility] [address] [facility]  [place] [address]  [phone]    Schedule an appointment as soon as possible for a visit       Jamee Gibbs MD  [address]  [place] [address]  [phone]    Schedule an appointment as soon as possible for a visit            Medication List        New Prescriptions      isosorbide mononitrate 30 MG 24 hr tablet  Commonly known  as: IMDUR  Take 1 tablet by mouth Daily.               Where to Get Your Medications        You can get these medications from any pharmacy    Bring a paper prescription for each of these medications  isosorbide mononitrate 30 MG 24 hr tablet            Dejuan Cary PA  08/03/23 [id number]

## 2023-12-01 ENCOUNTER — LAB (OUTPATIENT)
Dept: ONCOLOGY | Facility: CLINIC | Age: 83
End: 2023-12-01
Payer: MEDICARE

## 2023-12-01 ENCOUNTER — OFFICE VISIT (OUTPATIENT)
Dept: ONCOLOGY | Facility: CLINIC | Age: 83
End: 2023-12-01
Payer: MEDICARE

## 2023-12-01 VITALS
SYSTOLIC BLOOD PRESSURE: 136 MMHG | HEART RATE: 70 BPM | HEIGHT: 69 IN | DIASTOLIC BLOOD PRESSURE: 73 MMHG | OXYGEN SATURATION: 98 % | WEIGHT: 193 LBS | TEMPERATURE: 97.1 F | BODY MASS INDEX: 28.58 KG/M2 | RESPIRATION RATE: 18 BRPM

## 2023-12-01 DIAGNOSIS — C82.13 FOLLICULAR LYMPHOMA GRADE II OF INTRA-ABDOMINAL LYMPH NODES: Primary | ICD-10-CM

## 2023-12-01 DIAGNOSIS — C82.13 FOLLICULAR LYMPHOMA GRADE II OF INTRA-ABDOMINAL LYMPH NODES: ICD-10-CM

## 2023-12-01 LAB
ALBUMIN SERPL-MCNC: 4.1 G/DL (ref 3.5–5.2)
ALBUMIN/GLOB SERPL: 1.5 G/DL
ALP SERPL-CCNC: 146 U/L (ref 39–117)
ALT SERPL W P-5'-P-CCNC: <5 U/L (ref 1–33)
ANION GAP SERPL CALCULATED.3IONS-SCNC: 11.5 MMOL/L (ref 5–15)
AST SERPL-CCNC: 18 U/L (ref 1–32)
BASOPHILS # BLD AUTO: 0.03 10*3/MM3 (ref 0–0.2)
BASOPHILS NFR BLD AUTO: 0.5 % (ref 0–1.5)
BILIRUB SERPL-MCNC: 0.8 MG/DL (ref 0–1.2)
BUN SERPL-MCNC: 24 MG/DL (ref 8–23)
BUN/CREAT SERPL: 21.8 (ref 7–25)
CALCIUM SPEC-SCNC: 10.3 MG/DL (ref 8.6–10.5)
CHLORIDE SERPL-SCNC: 102 MMOL/L (ref 98–107)
CO2 SERPL-SCNC: 25.5 MMOL/L (ref 22–29)
CREAT SERPL-MCNC: 1.1 MG/DL (ref 0.57–1)
DEPRECATED RDW RBC AUTO: 44.7 FL (ref 37–54)
EGFRCR SERPLBLD CKD-EPI 2021: 49.3 ML/MIN/1.73
EOSINOPHIL # BLD AUTO: 0.19 10*3/MM3 (ref 0–0.4)
EOSINOPHIL NFR BLD AUTO: 3.1 % (ref 0.3–6.2)
ERYTHROCYTE [DISTWIDTH] IN BLOOD BY AUTOMATED COUNT: 13 % (ref 12.3–15.4)
GLOBULIN UR ELPH-MCNC: 2.8 GM/DL
GLUCOSE SERPL-MCNC: 237 MG/DL (ref 65–99)
HCT VFR BLD AUTO: 41.3 % (ref 34–46.6)
HGB BLD-MCNC: 13.2 G/DL (ref 12–15.9)
IMM GRANULOCYTES # BLD AUTO: 0.02 10*3/MM3 (ref 0–0.05)
IMM GRANULOCYTES NFR BLD AUTO: 0.3 % (ref 0–0.5)
LYMPHOCYTES # BLD AUTO: 1.07 10*3/MM3 (ref 0.7–3.1)
LYMPHOCYTES NFR BLD AUTO: 17.7 % (ref 19.6–45.3)
MCH RBC QN AUTO: 29.9 PG (ref 26.6–33)
MCHC RBC AUTO-ENTMCNC: 32 G/DL (ref 31.5–35.7)
MCV RBC AUTO: 93.4 FL (ref 79–97)
MONOCYTES # BLD AUTO: 0.44 10*3/MM3 (ref 0.1–0.9)
MONOCYTES NFR BLD AUTO: 7.3 % (ref 5–12)
NEUTROPHILS NFR BLD AUTO: 4.29 10*3/MM3 (ref 1.7–7)
NEUTROPHILS NFR BLD AUTO: 71.1 % (ref 42.7–76)
NRBC BLD AUTO-RTO: 0 /100 WBC (ref 0–0.2)
PLATELET # BLD AUTO: 176 10*3/MM3 (ref 140–450)
PMV BLD AUTO: 10.4 FL (ref 6–12)
POTASSIUM SERPL-SCNC: 4.7 MMOL/L (ref 3.5–5.2)
PROT SERPL-MCNC: 6.9 G/DL (ref 6–8.5)
RBC # BLD AUTO: 4.42 10*6/MM3 (ref 3.77–5.28)
SODIUM SERPL-SCNC: 139 MMOL/L (ref 136–145)
WBC NRBC COR # BLD AUTO: 6.04 10*3/MM3 (ref 3.4–10.8)

## 2023-12-01 PROCEDURE — 85025 COMPLETE CBC W/AUTO DIFF WBC: CPT | Performed by: INTERNAL MEDICINE

## 2023-12-01 PROCEDURE — 99214 OFFICE O/P EST MOD 30 MIN: CPT | Performed by: INTERNAL MEDICINE

## 2023-12-01 PROCEDURE — 1126F AMNT PAIN NOTED NONE PRSNT: CPT | Performed by: INTERNAL MEDICINE

## 2023-12-01 PROCEDURE — 80053 COMPREHEN METABOLIC PANEL: CPT | Performed by: INTERNAL MEDICINE

## 2023-12-01 NOTE — PROGRESS NOTES
Venipuncture Blood Specimen Collection  Venipuncture performed in left arm by Lily Amaya MA with good hemostasis. Patient tolerated the procedure well without complications.   12/01/23   Lily Amaya MA

## 2023-12-01 NOTE — PROGRESS NOTES
"  Name:  Rachana Justin  :  1940  Date:  2023     REFERRING PHYSICIAN  Troy Elkins MD    PRIMARY CARE PHYSICIAN  Shabnam De La Rosa MD    REASON FOR FOLLOWUP  1. 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    Dear Troy,    HISTORY OF PRESENT ILLNESS:   I saw Ms. Justin in follow up today in our hematology/oncology clinic. As you are aware, she is a pleasant, 83 y.o., white female with a history of hypertension, diabetes and COPD who presented to our ED on 2021 for abdominal pain. CT scans identified a right lower quadrant, mesenteric mass. Follow up was arranged with you in your clinic. A NM PET scan on 2021 showed that, in the interim, this mass had \"resolved\" while one in the left lower abdomen had \"developed\" (which likely represented the same lymph node(s) changing position, due to mobility of the mesentery). You ultimately took her to the OR on 2021 for a diagnostic laparoscopy (which had to be converted to an open laparotomy due to the location of the lymphadenopathy at the mesenteric root). A couple of lymph nodes and a small segment of the proximal ileum were successfully excised, and the final pathology results were consistent with grade 2 follicular lymphoma. Though she developed a postoperative ileus and anemia, she was able to be discharged on 2021. She was subsequently referred to our clinic for further evaluation and management. Routine monitoring was, and remains, recommended.    INTERIM HISTORY:  Ms. Justin returns to clinic today for follow up by herself (her daughter did not come with her this time). Ms. Justin once again denies any B-symptoms (fevers, chills, night sweats, unexplained weight loss). She continues to feel overall well and once again has no new or specific complaints.    Past Medical History:   Diagnosis Date    Ambulates with cane     Arthritis     Atrial fibrillation     COPD (chronic obstructive " pulmonary disease)     COVID-19 virus infection 2020    Diabetes mellitus     Gallstones     GERD (gastroesophageal reflux disease)     Heart disease     Hyperlipidemia     Hypertension     Mesenteric lymphadenopathy 2021    Added automatically from request for surgery [id number]    Pulmonary hypertension     Status post laparoscopic cholecystectomy 3/9/2021       Past Surgical History:   Procedure Laterality Date    CATARACT EXTRACTION Bilateral     CHOLECYSTECTOMY N/A 3/26/2021    Procedure: CHOLECYSTECTOMY LAPAROSCOPIC (REQUESTS MARIVEL NEWMAN);  Surgeon: Keagan Lopez MD;  Location: [facility] OR;  Service: General;  Laterality: N/A;    COLONOSCOPY N/A 2021    Procedure: COLONOSCOPY;  Surgeon: Troy Elkins MD;  Location: [facility] OR;  Service: Gastroenterology;  Laterality: N/A;    DIAGNOSTIC LAPAROSCOPY N/A 2021    Procedure: DIAGNOSTIC LAPAROSCOPY CONVERTED TO  OPEN @ 918 WITH SMALL BOWEL RESECTION;  Surgeon: Troy Elkins MD;  Location: [facility] OR;  Service: General;  Laterality: N/A;    ENDOSCOPY N/A 2021    Procedure: ESOPHAGOGASTRODUODENOSCOPY WITH ANESTHESIA;  Surgeon: Troy Elkins MD;  Location: [facility] OR;  Service: Gastroenterology;  Laterality: N/A;    FRACTURE SURGERY      right foot orif    PACEMAKER IMPLANTATION Left 2018       Social History     Socioeconomic History    Marital status:    Tobacco Use    Smoking status: Former     Packs/day: 0.50     Years: 10.00     Additional pack years: 0.00     Total pack years: 5.00     Types: Cigarettes     Quit date: 3/1/1963     Years since quittin.7    Smokeless tobacco: Never   Vaping Use    Vaping Use: Never used   Substance and Sexual Activity    Alcohol use: No    Drug use: Never    Sexual activity: Defer       Family History   Problem Relation Age of Onset    Breast cancer Sister     Breast cancer Sister     Breast cancer Sister         40's    Cancer Mother        Allergies   Allergen  Reactions    Nitrofurantoin GI Intolerance       Current Outpatient Medications   Medication Sig Dispense Refill    acetaminophen (TYLENOL) 325 MG tablet Take 2 tablets by mouth Every 4 (Four) Hours As Needed for Mild Pain . 30 tablet 0    amLODIPine (NORVASC) 5 MG tablet Take 1 tablet by mouth Daily. 30 tablet 3    apixaban (ELIQUIS) 5 MG tablet tablet Take 1 tablet by mouth 2 (Two) Times a Day. ON HOLD PER DR CEJA      aspirin 81 MG chewable tablet Chew 1 tablet Daily.      atorvastatin (LIPITOR) 10 MG tablet Take 1 tablet by mouth Every Night. 30 tablet 3    bumetanide (BUMEX) 1 MG tablet Take 1 tablet by mouth Daily.      carvedilol (COREG) 12.5 MG tablet Take 1 tablet by mouth 2 (Two) Times a Day With Meals.      docusate sodium (Colace) 100 MG capsule Take 1 capsule by mouth 2 (Two) Times a Day As Needed for Constipation. 60 capsule 0    ferrous sulfate 325 (65 FE) MG tablet Take 1 tablet by mouth 2 (two) times a day. 60 tablet 2    fesoterodine fumarate (TOVIAZ ER) 8 MG tablet sustained-release 24 hour tablet Take 1 tablet by mouth Daily.      furosemide (LASIX) 40 MG tablet Take 1 tablet by mouth 3 (Three) Times a Week if Needed (FLUID RETENTION).      HYDROcodone-acetaminophen (NORCO) 5-325 MG per tablet Take 1 tablet by mouth Every 8 (Eight) Hours As Needed for Severe Pain . 8 tablet 0    Insulin Glargine, 1 Unit Dial, (TOUJEO) 300 UNIT/ML solution pen-injector injection Inject 50 Units under the skin into the appropriate area as directed Daily.      isosorbide mononitrate (IMDUR) 30 MG 24 hr tablet Take 1 tablet by mouth Daily. 30 tablet 0    losartan (COZAAR) 50 MG tablet Take 1 tablet by mouth Daily.      omeprazole (priLOSEC) 40 MG capsule Take 1 capsule by mouth Daily As Needed (stomach).      potassium chloride ER (K-TAB) 20 MEQ tablet controlled-release ER tablet Take 1 tablet by mouth 3 (Three) Times a Week. Prior to [facility] Admission, Patient was on: only takes when she takes Furosemide       "Semaglutide, 1 MG/DOSE, (Ozempic, 1 MG/DOSE,) 4 MG/3ML solution pen-injector Inject 1 mg under the skin into the appropriate area as directed Every 7 (Seven) Days.      spironolactone (ALDACTONE) 25 MG tablet Take 1 tablet by mouth Daily.      vitamin B-12 (CYANOCOBALAMIN) 1000 MCG tablet Take 1 tablet by mouth Daily.      vitamin D (ERGOCALCIFEROL) 1.25 MG (22377 UT) capsule capsule Take 1 capsule by mouth 1 (One) Time Per Week.       No current facility-administered medications for this visit.     REVIEW OF SYSTEMS  CONSTITUTIONAL:  No fever, chills or night sweats. Improved fatigue compared to last year.  EYES:  No blurry vision, diplopia or other vision changes.  ENT:  No hearing loss, nosebleeds or sore throat.  CARDIOVASCULAR:  No palpitations, arrhythmia, syncopal episodes or edema.  PULMONARY:  No hemoptysis, wheezing, chronic cough or shortness of breath.  GASTROINTESTINAL:  No nausea or vomiting. No constipation or diarrhea. No abdominal pain.  GENITOURINARY:  No hematuria, kidney stones or frequent urination.  MUSCULOSKELETAL:  No joint or back pains.  INTEGUMENTARY: No rashes or pruritus.  ENDOCRINE:  No excessive thirst or hot flashes.  HEMATOLOGIC:  No history of free bleeding, spontaneous bleeding or clotting.  IMMUNOLOGIC:  No allergies or frequent infections.  NEUROLOGIC: No numbness, tingling, seizures or weakness.  PSYCHIATRIC:  No anxiety or depression.    PHYSICAL EXAMINATION  /73   Pulse 70   Temp 97.1 °F (36.2 °C)   Resp 18   Ht 175.3 cm (69\")   Wt 87.5 kg (193 lb)   SpO2 98%   BMI 28.50 kg/m²     Pain Score:  Pain Score    23 1011   PainSc: 0-No pain     PHQ-Score Total:  PHQ-9 Total Score:      ECO  GENERAL:  A well-developed, well-nourished, elderly, white female in no acute distress, still ambulating with the assistance of a cane.  HEENT:  Pupils equally round and reactive to light. Extraocular muscles intact.  CARDIOVASCULAR:  Regular rate and rhythm. No murmurs, " gallops or rubs.  LUNGS:  Clear to auscultation bilaterally.  ABDOMEN:  Soft, nontender, nondistended with positive bowel sounds.  EXTREMITIES:  No clubbing or cyanosis bilaterally. Stable, ~1-2+ edema in the bilateral lower extremities.  SKIN:  No rashes or petechiae.  NEURO:  Cranial nerves grossly intact. No focal deficits.  PSYCH:  Alert and oriented x3.    The physical exam is once again unchanged from priors.    LABORATORY  Lab Results   Component Value Date    WBC 6.04 12/01/2023    HGB 13.2 12/01/2023    HCT 41.3 12/01/2023    MCV 93.4 12/01/2023     12/01/2023    NEUTROABS 4.29 12/01/2023       Lab Results   Component Value Date     08/03/2023    K 4.3 08/03/2023     08/03/2023    CO2 22.8 08/03/2023    BUN 18 08/03/2023    CREATININE 1.06 (H) 08/03/2023    GLUCOSE 218 (H) 08/03/2023    CALCIUM 10.2 08/03/2023    AST 15 08/03/2023    ALT 12 08/03/2023    ALKPHOS 110 08/03/2023    BILITOT 1.2 08/03/2023    PROTEINTOT 7.0 08/03/2023    ALBUMIN 4.3 08/03/2023     CBC (12/01/2023): WBCs: 6.04; HgB: 13.2; Hct: 41.3; platelets: 176  CBC (08/03/2023): WBCs: 6.20; HgB: 12.5; Hct: 39.2; platelets: 174  CBC (06/01/2023): WBCs: 5.52; HgB: 12.3; Hct: 38.5; platelets: 150  CBC (12/01/2022): WBCs: 7.09; HgB: 12.3; Hct: 38.5; platelets: 163  CBC (06/01/2022): WBCs: 6.29; HgB: 11.0; Hct: 34.8; platelets: 223  CBC (02/21/2022): WBCs: 5.31; HgB: 10.9; Hct: 34.7; platelets: 142  CBC (10/27/2021): WBCs: 5.41; HgB: 9.9; Hct: 33.3; platelets: 183  CBC (10/06/2021): WBCs: 4.85; HgB: 8.7; Hct: 29.8; platelets: 184  CBC (08/18/2021): WBCs: 6.65; HgB: 7.5; Hct: 24.5; platelets: 173    Iron profile (12/01/2022): serum iron: 75; % saturation: 20; TIBC: 377; ferritin: 150.5 ng/mL  Iron profile (06/01/2022): serum iron: 64; % saturation: 19; TIBC: 341; ferritin: 176.6 ng/mL  Iron profile (02/21/2022): serum iron: 58; % saturation: 15; TIBC: 380; ferritin: 87.74 ng/mL  Iron profile (10/27/2021): serum iron: 44; %  saturation: 9; TIBC: 514; ferritin: 57.67 ng/mL    IMAGING  CT abdomen and pelvis without contrast (05/29/2021):  Impression:  1) Adenopathy in the right lower quadrant mesentery concerning for malignancy. There is some adjacent inflammatory fat stranding with one of the nodes. Consider PET/CT for followup.  2) Mild thickening of the rectosigmoid colon could be due to incomplete distention or mild segmental colitis. There is colonic diverticulosis without focal diverticulitis.  3) Grossly normal small bowel and appendix.  4) Cholecystectomy with some mild fat stranding in the gallbladder fossa. This could be due to relatively recent surgery or some mild inflammation. No fluid collection.  5) Additional findings [are nonacute].    NM PET with fusion CT, skull base to mid-thigh (06/14/2021, compared to CT of the abdomen/pelvis dated 05/29/2021):  Impression:  1) Significant interval improvement in the appearance of the large right lower quadrant soft tissue masses. Residual nodules are present but they do not show FDG avidity.  2) Interval development of a large, soft tissue nodule in the left lower quadrant which does show hypermetabolic activity.    CT soft tissue neck with contrast (10/22/2021):  Impression: No cervical lymphadenopathy or evidence of lymphomatous involvement in the neck.    CT chest with contrast (10/22/2021):  Impression: Stable chest. No findings in the CT of the chest to suggest lymphoma.    CT abdomen and pelvis with contrast (10/22/2021):  Impression: The hypermetabolic soft tissue mass in the left abdomen on the PET fusion CT is not demonstrable on today's CT. There is fatty change in the liver with a small amount of ascites around the liver. There are prominent lymph nodes along the iliac lymph node chains in the pelvis that are in the 1 cm size range.    CT abdomen and pelvis without contrast (05/18/2022, compared to 10/22/2021):  Impression:  1) The heart is enlarged.  2) No enlarged  retroperitoneal or mesenteric lymph nodes at this time. No evidence of residual or recurrent lymphoma.  3) Fat-containing umbilical hernia with a diameter of 3.5 cm.    PATHOLOGY  Lymph node, small bowel mesenteric #1 (08/12/2021):  Follicular lymphoma, grade 2.    Lymph node, small bowel mesenteric #2 (08/12/2021):  Follicular lymphoma, grade 2.    Small bowel segment, proximal ileum (08/12/2021):  Follicular lymphoma, grade 2. Small bowel also involved by follicular lymphoma. Viable surgical margins.    IMPRESSION AND PLAN  Ms. Justin is an 83 y.o., white female with:  Follicular lymphoma: Diagnosed in midsuer 2021 with, technically, stage IV (a solid organ, the small bowel, was involved), but now fully (or at least mostly) resected disease. I have had multiple, long discussions with the patient (+/- her daughter) since the time of her initial consultation in our clinic (on 10/06/2021) regarding this diagnosis and, in general terms, its prognosis. The postoperative CT scans of the neck, chest, abdomen and pelvis (performed on 10/22/2021 and summarized above) showed no evidence of active lymphoma (a few, prominent, but only ~1 cm in size, lymph nodes in the iliac chains were the only notable findings; there was/is no evidence of any adenopathy at all anywhere else); and this continues to be the case on the most recent repeat imaging (a CT of the abdomen and pelvis performed on 05/18/2022 and also summarized above). Given this, continued, expectant monitoring alone remains recommended. Particularly since she is (now) 83 years old, this indolent disease very well may never require any specific treatment again during her lifetime (although, even if it does, a number of options are available to us). We will see her back in clinic in six months (late May 2024) with a CBC, CMP and repeat CTs of the chest, abdomen and pelvis. In the continued absence of a concerning clinical change (such as recurrent symptoms, etc),  follow up imaging is now being repeated very infrequently (~every two years or so). The patient was in agreement with these plans.    It is a pleasure to participate in Ms. Justin's care. Please do not hesitate to call with any questions or concerns that you may have.    A total of 30 minutes were spent coordinating this patient’s care in clinic today; more than 50% of this time was face-to-face with the patient, reviewing her interim medical history, discussing the results of today's labwork and counseling on the current followup plan. All questions were answered to her satisfaction.    FOLLOW UP  Return to our clinic in 6 months (~late May 2024) with a CBC, CMP and CTs of the chest, abdomen and pelvis without contrast.          This document was electronically signed by MYAH Vinson MD December 1, 2023 10:30 EST      CC: MD Shabnam Cline MD

## 2024-02-19 ENCOUNTER — TRANSCRIBE ORDERS (OUTPATIENT)
Dept: ADMINISTRATIVE | Facility: HOSPITAL | Age: 84
End: 2024-02-19
Payer: MEDICARE

## 2024-02-19 DIAGNOSIS — Z12.31 VISIT FOR SCREENING MAMMOGRAM: Primary | ICD-10-CM

## 2024-02-20 NOTE — OP NOTE
DIAGNOSTIC LAPAROSCOPY  Procedure Note    Rachana Justin  8/12/2021    Pre-op Diagnosis:   Mesenteric lymphadenopathy [R59.0]    Post-op Diagnosis:     Post-Op Diagnosis Codes:     * Mesenteric lymphadenopathy [R59.0]    Procedure(s):  DIAGNOSTIC LAPAROSCOPY CONVERTED TO  OPEN @ 0918 WITH SMALL BOWEL RESECTION    Surgeon(s):  Troy Elkins MD    Anesthesia: Choice    Staff:   Circulator: Ko Horan RN  Scrub Person: Monet Adan  Assistant: Reyes Quinonez    Findings: Large mesenteric lymph nodes at the root of the mesentery of a 56cm segment of proximal ileum that was resected including the massively enlarged nodes.  Multiple reactive nodes and mildly enlarged nodes were palpated in the root of the mesentery but could not be resected due to fear of compromise of the small bowel blood supply.    Operative Procedure: The patient was taken operating suite and placed supine on operating table.  Bilateral sequential compression devices were applied and general endotracheal anesthesia administered.  The abdomen was prepped and draped in usual sterile fashion.  Preoperative antibiotics were confirmed.  Timeout procedure was performed.  Veress needle inserted Castaneda's point saline drop test confirmed entering the peritoneal space.  Pneumoperitoneum was established.  Through an infraumbilical incision a 5 mm optical trocar was started.  Laparoscope was used to survey the abdomen and liver was within normal limits.  No peritoneal nodules or masses were noted.  Patient had undergone previous normal colonoscopy and EGD.  No external masses of the colon or stomach were noted.  The omentum was within normal limits.  The small bowel was run from the ligament of Treitz to the terminal ileum and there were 2 very large areas of lymphadenopathy and a segment of proximal ileum with the lymphadenopathy located at the root of the mesentery.  Due to the location the patient required open resection due to concern  for possible bleeding.  At this time the trochars were removed and pneumoperitoneum was evacuated.  A laparotomy incision was made from just above the umbilicus to an area just below the umbilicus.  Dissection was carried through the subcutaneous tissues to the linea alba which was opened sharply and the peritoneum was opened sharply and the peritoneal incision extended along the length of the skin incision.  The area of enlarged lymphadenopathy was identified and a small lymph node was dissected free to be sent to pathology.  There was a very large lymph node that was able to be partially biopsied but due to the location it was felt the patient may have curative results to take out these 2 large lymph nodes.  The first lymph node was approximately 5 cm in diameter and the second approximately 2.5 cm in diameter.  These were at the root of the area of small bowel mesentery and it was felt that proximally and distally a 56 cm segment of small bowel would need resection due to the distribution of the blood supply of this area after resection of the root of his mesentery.  YOVANY stapling device was used to divide proximal and distal small bowel and a new energy device was used to divide the mesentery and the mesentery was divided proximal to the lymph nodes.  The area of lymphadenopathy and small bowel were removed and sent to pathology and flow cytometry.  The abdomen was then irrigated with copious normal saline all irrigant was suctioned free.  Bleeding of vessels on the mesenteric resection line were controlled with figure-of-eight sutures and stick ties.  At this time a side to side stapled anastomosis was performed with a YOVANY stapling device and common enterotomy was closed with a 30 TA stapling device.  A crotch stitch was placed at the distal staple line between the small bowel segments and the staple lines were hemostatic.  The mesenteric defect was closed with interrupted figure-of-eight silk sutures and the  small bowel was reduced back into the abdomen.  The midline laparotomy incision was closed with running looped PDS suture from the superior and inferior aspects and the skin was closed with deep dermal Vicryl followed by Monocryl subcuticular skin approximation and skin affix.  The laparoscopic sites were closed similarly.  At this time all sponge lap and needle counts were correct prior to closure and a new closing tray had been utilized for closure with the staff donning new gown and gloves for sterility purposes.  At this time the patient was awakened from anesthesia and taken to recovery.    Estimated Blood Loss: 25 mL    Specimens:   Proximal ileum, mesenteric lymph nodes           Drains: None    Grafts/Implants: None    Complications: None      Troy Elkins MD     Date: 8/12/2021  Time: 10:27 EDT     Yes...

## 2024-02-26 ENCOUNTER — HOSPITAL ENCOUNTER (OUTPATIENT)
Dept: MAMMOGRAPHY | Facility: HOSPITAL | Age: 84
Discharge: HOME | End: 2024-02-26
Admitting: INTERNAL MEDICINE
Payer: MEDICARE

## 2024-02-26 DIAGNOSIS — Z12.31 VISIT FOR SCREENING MAMMOGRAM: ICD-10-CM

## 2024-02-26 PROCEDURE — 77067 SCR MAMMO BI INCL CAD: CPT

## 2024-02-26 PROCEDURE — 77063 BREAST TOMOSYNTHESIS BI: CPT

## 2024-02-26 PROCEDURE — 77067 SCR MAMMO BI INCL CAD: CPT | Performed by: RADIOLOGY

## 2024-02-26 PROCEDURE — 77063 BREAST TOMOSYNTHESIS BI: CPT | Performed by: RADIOLOGY

## 2024-05-08 ENCOUNTER — OFFICE VISIT (OUTPATIENT)
Dept: SURGERY | Facility: CLINIC | Age: 84
End: 2024-05-08
Payer: MEDICARE

## 2024-05-08 VITALS — HEIGHT: 69 IN | BODY MASS INDEX: 28.17 KG/M2 | WEIGHT: 190.2 LBS

## 2024-05-08 DIAGNOSIS — N89.8 ITCHING IN THE VAGINAL AREA: Primary | ICD-10-CM

## 2024-05-08 DIAGNOSIS — K64.1 GRADE II HEMORRHOIDS: ICD-10-CM

## 2024-05-08 PROCEDURE — 1159F MED LIST DOCD IN RCRD: CPT | Performed by: SURGERY

## 2024-05-08 PROCEDURE — 1160F RVW MEDS BY RX/DR IN RCRD: CPT | Performed by: SURGERY

## 2024-05-08 PROCEDURE — 99203 OFFICE O/P NEW LOW 30 MIN: CPT | Performed by: SURGERY

## 2024-05-08 RX ORDER — PRAMOXINE HYDROCHLORIDE 10 MG/G
AEROSOL, FOAM TOPICAL EVERY 6 HOURS PRN
Qty: 15 G | Refills: 0 | Status: SHIPPED | OUTPATIENT
Start: 2024-05-08

## 2024-05-15 ENCOUNTER — PROCEDURE VISIT (OUTPATIENT)
Dept: SURGERY | Facility: CLINIC | Age: 84
End: 2024-05-15
Payer: MEDICARE

## 2024-05-15 VITALS — HEIGHT: 69 IN | WEIGHT: 190 LBS | BODY MASS INDEX: 28.14 KG/M2

## 2024-05-15 DIAGNOSIS — N89.8 ITCHING IN THE VAGINAL AREA: Primary | ICD-10-CM

## 2024-05-15 NOTE — PROGRESS NOTES
Subjective   Rachana Justin is a 83 y.o. female  is here today for follow-up.         Rachana Justin is a 83 y.o. female here for follow up for punch biopsy of the skin of the suprapubic and vaginal region due to skin irritation and chronic itching.    Physical Exam  Well-demarcated area of skin irritation around the labia and mons pubis    3 mm punch biopsy x 2 right suprapubic skin    Of the skin was prepped in usual sterile fashion local anesthetic was injected.  A 3 mm punch biopsy was used to obtain 2 specimens which were sent to pathology.  After the specimens were removed the incision was hemostatic and closed with nylon suture.  Bandage was applied.  Patient tolerated the procedure well.    Estimated blood loss: Minimal    Specimens: Suprapubic skin biopsy    Complications: None            Assessment     Diagnoses and all orders for this visit:    1. Itching in the vaginal area (Primary)      Rachana Justin is a 83 y.o. female with chronic itching of the vaginal and perianal area with skin irritation.  Biopsy sent today to see if underlying etiology can be identified.

## 2024-05-16 LAB — REF LAB TEST METHOD: NORMAL

## 2024-05-16 NOTE — PROGRESS NOTES
Leila   Rachanafrank Justin is a 80 y.o. female.     Chief Complaint: post lap daniel    History of Present Illness She is feeling tired post lap daniel.     The following portions of the patient's history were reviewed and updated as appropriate: current medications, past family history, past medical history, past social history, past surgical history and problem list.    Review of Systems    Objective   Physical Exam wounds ok    Assessment/Plan   Diagnoses and all orders for this visit:    1. Status post laparoscopic cholecystectomy (Primary)    return prn          2

## 2024-05-29 ENCOUNTER — OFFICE VISIT (OUTPATIENT)
Dept: SURGERY | Facility: CLINIC | Age: 84
End: 2024-05-29
Payer: MEDICARE

## 2024-05-29 VITALS — BODY MASS INDEX: 28.14 KG/M2 | WEIGHT: 190 LBS | HEIGHT: 69 IN

## 2024-05-29 DIAGNOSIS — N89.8 ITCHING IN THE VAGINAL AREA: Primary | ICD-10-CM

## 2024-05-29 NOTE — PROGRESS NOTES
Subjective   Rachana Justin is a 83 y.o. female  is here today for follow-up.         Rachana Justin is a 83 y.o. female here for follow up after punch biopsy of the vaginal tissues due to chronic itching.  Seborrheic keratosis was noted to this area but no significant abnormality was evident as a source of her itching which has seemed to improve with topical therapy.    Physical Exam  Area of irritation of the suprapubic and vulvar regions much improved.            Assessment     Diagnoses and all orders for this visit:    1. Itching in the vaginal area (Primary)      Rachana Justin is a 83 y.o. female with resolving itching and irritation of the vulvar and suprapubic tissues.  Biopsy was not very revealing as to a source of the patient's itching and she will continue current management and follow-up as needed.  Suture removed from biopsy site today.

## 2024-05-31 ENCOUNTER — HOSPITAL ENCOUNTER (OUTPATIENT)
Dept: RADIATION ONCOLOGY | Facility: HOSPITAL | Age: 84
Discharge: HOME | End: 2024-05-31
Payer: MEDICARE

## 2024-06-03 ENCOUNTER — HOSPITAL ENCOUNTER (OUTPATIENT)
Dept: RADIATION ONCOLOGY | Facility: HOSPITAL | Age: 84
Discharge: HOME | End: 2024-06-03
Payer: MEDICARE

## 2024-06-03 ENCOUNTER — LAB (OUTPATIENT)
Dept: ONCOLOGY | Facility: CLINIC | Age: 84
End: 2024-06-03
Payer: MEDICARE

## 2024-06-03 ENCOUNTER — OFFICE VISIT (OUTPATIENT)
Dept: ONCOLOGY | Facility: CLINIC | Age: 84
End: 2024-06-03
Payer: MEDICARE

## 2024-06-03 VITALS
HEIGHT: 69 IN | HEART RATE: 67 BPM | SYSTOLIC BLOOD PRESSURE: 111 MMHG | RESPIRATION RATE: 20 BRPM | BODY MASS INDEX: 29.65 KG/M2 | TEMPERATURE: 98 F | OXYGEN SATURATION: 97 % | DIASTOLIC BLOOD PRESSURE: 56 MMHG | WEIGHT: 200.2 LBS

## 2024-06-03 DIAGNOSIS — C82.13 FOLLICULAR LYMPHOMA GRADE II OF INTRA-ABDOMINAL LYMPH NODES: Primary | ICD-10-CM

## 2024-06-03 DIAGNOSIS — C82.13 FOLLICULAR LYMPHOMA GRADE II OF INTRA-ABDOMINAL LYMPH NODES: ICD-10-CM

## 2024-06-03 LAB
ALBUMIN SERPL-MCNC: 3.8 G/DL (ref 3.5–5.2)
ALBUMIN/GLOB SERPL: 1.5 G/DL
ALP SERPL-CCNC: 126 U/L (ref 39–117)
ALT SERPL W P-5'-P-CCNC: 9 U/L (ref 1–33)
ANION GAP SERPL CALCULATED.3IONS-SCNC: 10.5 MMOL/L (ref 5–15)
AST SERPL-CCNC: 14 U/L (ref 1–32)
BASOPHILS # BLD AUTO: 0.04 10*3/MM3 (ref 0–0.2)
BASOPHILS NFR BLD AUTO: 0.8 % (ref 0–1.5)
BILIRUB SERPL-MCNC: 0.6 MG/DL (ref 0–1.2)
BUN SERPL-MCNC: 23 MG/DL (ref 8–23)
BUN/CREAT SERPL: 19 (ref 7–25)
CALCIUM SPEC-SCNC: 9.3 MG/DL (ref 8.6–10.5)
CHLORIDE SERPL-SCNC: 105 MMOL/L (ref 98–107)
CO2 SERPL-SCNC: 23.5 MMOL/L (ref 22–29)
CREAT SERPL-MCNC: 1.21 MG/DL (ref 0.57–1)
DEPRECATED RDW RBC AUTO: 47 FL (ref 37–54)
EGFRCR SERPLBLD CKD-EPI 2021: 44 ML/MIN/1.73
EOSINOPHIL # BLD AUTO: 0.01 10*3/MM3 (ref 0–0.4)
EOSINOPHIL NFR BLD AUTO: 0.2 % (ref 0.3–6.2)
ERYTHROCYTE [DISTWIDTH] IN BLOOD BY AUTOMATED COUNT: 13.3 % (ref 12.3–15.4)
GLOBULIN UR ELPH-MCNC: 2.5 GM/DL
GLUCOSE SERPL-MCNC: 318 MG/DL (ref 65–99)
HCT VFR BLD AUTO: 39.2 % (ref 34–46.6)
HGB BLD-MCNC: 12.2 G/DL (ref 12–15.9)
IMM GRANULOCYTES # BLD AUTO: 0.01 10*3/MM3 (ref 0–0.05)
IMM GRANULOCYTES NFR BLD AUTO: 0.2 % (ref 0–0.5)
LYMPHOCYTES # BLD AUTO: 1.25 10*3/MM3 (ref 0.7–3.1)
LYMPHOCYTES NFR BLD AUTO: 24.6 % (ref 19.6–45.3)
MCH RBC QN AUTO: 29.5 PG (ref 26.6–33)
MCHC RBC AUTO-ENTMCNC: 31.1 G/DL (ref 31.5–35.7)
MCV RBC AUTO: 94.9 FL (ref 79–97)
MONOCYTES # BLD AUTO: 0.44 10*3/MM3 (ref 0.1–0.9)
MONOCYTES NFR BLD AUTO: 8.7 % (ref 5–12)
NEUTROPHILS NFR BLD AUTO: 3.33 10*3/MM3 (ref 1.7–7)
NEUTROPHILS NFR BLD AUTO: 65.5 % (ref 42.7–76)
NRBC BLD AUTO-RTO: 0 /100 WBC (ref 0–0.2)
PLATELET # BLD AUTO: 139 10*3/MM3 (ref 140–450)
PMV BLD AUTO: 10.4 FL (ref 6–12)
POTASSIUM SERPL-SCNC: 4.7 MMOL/L (ref 3.5–5.2)
PROT SERPL-MCNC: 6.3 G/DL (ref 6–8.5)
RBC # BLD AUTO: 4.13 10*6/MM3 (ref 3.77–5.28)
SODIUM SERPL-SCNC: 139 MMOL/L (ref 136–145)
WBC NRBC COR # BLD AUTO: 5.08 10*3/MM3 (ref 3.4–10.8)

## 2024-06-03 PROCEDURE — 1126F AMNT PAIN NOTED NONE PRSNT: CPT | Performed by: INTERNAL MEDICINE

## 2024-06-03 PROCEDURE — 74176 CT ABD & PELVIS W/O CONTRAST: CPT

## 2024-06-03 PROCEDURE — 71250 CT THORAX DX C-: CPT | Performed by: RADIOLOGY

## 2024-06-03 PROCEDURE — 80053 COMPREHEN METABOLIC PANEL: CPT | Performed by: INTERNAL MEDICINE

## 2024-06-03 PROCEDURE — 85025 COMPLETE CBC W/AUTO DIFF WBC: CPT | Performed by: INTERNAL MEDICINE

## 2024-06-03 PROCEDURE — 71250 CT THORAX DX C-: CPT

## 2024-06-03 PROCEDURE — 99214 OFFICE O/P EST MOD 30 MIN: CPT | Performed by: INTERNAL MEDICINE

## 2024-06-03 PROCEDURE — 74176 CT ABD & PELVIS W/O CONTRAST: CPT | Performed by: RADIOLOGY

## 2024-06-03 NOTE — PROGRESS NOTES
Venipuncture Blood Specimen Collection  Venipuncture performed in right arm by Twyla Junior MA with good hemostasis. Patient tolerated the procedure well without complications.   06/03/24   Twyla Junior MA

## 2024-06-03 NOTE — PROGRESS NOTES
"  Name:  Rachana Justin  :  1940  Date:  6/3/2024     REFERRING PHYSICIAN  Troy Elkins MD    PRIMARY CARE PHYSICIAN  Shabnam De La Rosa MD    REASON FOR FOLLOWUP  1. 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    Dear Troy,    HISTORY OF PRESENT ILLNESS:   I saw Ms. Justin in follow up today in our hematology/oncology clinic. As you are aware, she is a pleasant, 83 y.o., white female with a history of hypertension, diabetes and COPD who presented to our ED on 2021 for abdominal pain. CT scans identified a right lower quadrant, mesenteric mass. Follow up was arranged with you in your clinic. A NM PET scan on 2021 showed that, in the interim, this mass had \"resolved\" while one in the left lower abdomen had \"developed\" (which likely represented the same lymph node(s) changing position, due to mobility of the mesentery). You ultimately took her to the OR on 2021 for a diagnostic laparoscopy (which had to be converted to an open laparotomy due to the location of the lymphadenopathy at the mesenteric root). A couple of lymph nodes and a small segment of the proximal ileum were successfully excised, and the final pathology results were consistent with grade 2 follicular lymphoma. Though she developed a postoperative ileus and anemia, she was able to be discharged on 2021. She was subsequently referred to our clinic for further evaluation and management. Routine monitoring was, and remains, recommended.    INTERIM HISTORY:  Ms. Justin returns to clinic today for follow up once again accompanied by her daughter. Ms. Justin again denies any B-symptoms (fevers, chills, night sweats, unexplained weight loss). She continues to feel overall well and once again has no new or specific complaints.    Past Medical History:   Diagnosis Date    Ambulates with cane     Arthritis     Atrial fibrillation     COPD (chronic obstructive pulmonary disease)     " COVID-19 virus infection 2020    Diabetes mellitus     Gallstones     GERD (gastroesophageal reflux disease)     Heart disease     Hyperlipidemia     Hypertension     Mesenteric lymphadenopathy 2021    Added automatically from request for surgery [id number]    Pulmonary hypertension     Status post laparoscopic cholecystectomy 3/9/2021       Past Surgical History:   Procedure Laterality Date    CATARACT EXTRACTION Bilateral     CHOLECYSTECTOMY N/A 3/26/2021    Procedure: CHOLECYSTECTOMY LAPAROSCOPIC (REQUESTS MARIVEL NEWMAN);  Surgeon: Keagan Lopez MD;  Location: [facility] OR;  Service: General;  Laterality: N/A;    COLONOSCOPY N/A 2021    Procedure: COLONOSCOPY;  Surgeon: rToy Elkins MD;  Location: [facility] OR;  Service: Gastroenterology;  Laterality: N/A;    DIAGNOSTIC LAPAROSCOPY N/A 2021    Procedure: DIAGNOSTIC LAPAROSCOPY CONVERTED TO  OPEN @ 918 WITH SMALL BOWEL RESECTION;  Surgeon: Troy Elkins MD;  Location: [facility] OR;  Service: General;  Laterality: N/A;    ENDOSCOPY N/A 2021    Procedure: ESOPHAGOGASTRODUODENOSCOPY WITH ANESTHESIA;  Surgeon: Troy Elkins MD;  Location: [facility] OR;  Service: Gastroenterology;  Laterality: N/A;    FRACTURE SURGERY      right foot orif    PACEMAKER IMPLANTATION Left 2018       Social History     Socioeconomic History    Marital status:    Tobacco Use    Smoking status: Former     Current packs/day: 0.00     Average packs/day: 0.5 packs/day for 10.0 years (5.0 ttl pk-yrs)     Types: Cigarettes     Start date: 3/1/1953     Quit date: 3/1/1963     Years since quittin.3    Smokeless tobacco: Never   Vaping Use    Vaping status: Never Used   Substance and Sexual Activity    Alcohol use: No    Drug use: Never    Sexual activity: Defer       Family History   Problem Relation Age of Onset    Breast cancer Sister     Breast cancer Sister     Breast cancer Sister         40's    Cancer Mother        Allergies   Allergen  Reactions    Nitrofurantoin GI Intolerance       Current Outpatient Medications   Medication Sig Dispense Refill    acetaminophen (TYLENOL) 325 MG tablet Take 2 tablets by mouth Every 4 (Four) Hours As Needed for Mild Pain . 30 tablet 0    amLODIPine (NORVASC) 5 MG tablet Take 1 tablet by mouth Daily. 30 tablet 3    apixaban (ELIQUIS) 5 MG tablet tablet Take 1 tablet by mouth 2 (Two) Times a Day. ON HOLD PER DR CEJA      aspirin 81 MG chewable tablet Chew 1 tablet Daily.      atorvastatin (LIPITOR) 10 MG tablet Take 1 tablet by mouth Every Night. 30 tablet 3    bumetanide (BUMEX) 1 MG tablet Take 1 tablet by mouth Daily.      carvedilol (COREG) 12.5 MG tablet Take 1 tablet by mouth 2 (Two) Times a Day With Meals.      docusate sodium (Colace) 100 MG capsule Take 1 capsule by mouth 2 (Two) Times a Day As Needed for Constipation. 60 capsule 0    ferrous sulfate 325 (65 FE) MG tablet Take 1 tablet by mouth 2 (two) times a day. 60 tablet 2    fesoterodine fumarate (TOVIAZ ER) 8 MG tablet sustained-release 24 hour tablet Take 1 tablet by mouth Daily.      furosemide (LASIX) 40 MG tablet Take 1 tablet by mouth 3 (Three) Times a Week if Needed (FLUID RETENTION).      HYDROcodone-acetaminophen (NORCO) 5-325 MG per tablet Take 1 tablet by mouth Every 8 (Eight) Hours As Needed for Severe Pain . 8 tablet 0    Insulin Glargine, 1 Unit Dial, (TOUJEO) 300 UNIT/ML solution pen-injector injection Inject 50 Units under the skin into the appropriate area as directed Daily.      isosorbide mononitrate (IMDUR) 30 MG 24 hr tablet Take 1 tablet by mouth Daily. 30 tablet 0    losartan (COZAAR) 50 MG tablet Take 1 tablet by mouth Daily.      omeprazole (priLOSEC) 40 MG capsule Take 1 capsule by mouth Daily As Needed (stomach).      potassium chloride ER (K-TAB) 20 MEQ tablet controlled-release ER tablet Take 1 tablet by mouth 3 (Three) Times a Week. Prior to [facility] Admission, Patient was on: only takes when she takes Furosemide       "Pramoxine HCl, Perianal, (Proctofoam) 1 % foam Insert  into the rectum Every 6 (Six) Hours As Needed for Hemorrhoids. 15 g 0    Semaglutide, 1 MG/DOSE, (Ozempic, 1 MG/DOSE,) 4 MG/3ML solution pen-injector Inject 1 mg under the skin into the appropriate area as directed Every 7 (Seven) Days.      spironolactone (ALDACTONE) 25 MG tablet Take 1 tablet by mouth Daily.      vitamin B-12 (CYANOCOBALAMIN) 1000 MCG tablet Take 1 tablet by mouth Daily.      vitamin D (ERGOCALCIFEROL) 1.25 MG (24260 UT) capsule capsule Take 1 capsule by mouth 1 (One) Time Per Week.       No current facility-administered medications for this visit.     REVIEW OF SYSTEMS  CONSTITUTIONAL:  No fever, chills, night sweats or fatigue.  EYES:  No blurry vision, diplopia or other vision changes.  ENT:  No hearing loss, nosebleeds or sore throat.  CARDIOVASCULAR:  No palpitations, arrhythmia, syncopal episodes or edema.  PULMONARY:  No hemoptysis, wheezing, chronic cough or shortness of breath.  GASTROINTESTINAL:  No nausea or vomiting. No constipation or diarrhea. No abdominal pain.  GENITOURINARY:  No hematuria, kidney stones or frequent urination.  MUSCULOSKELETAL:  No joint or back pains.  INTEGUMENTARY: No rashes or pruritus.  ENDOCRINE:  No excessive thirst or hot flashes.  HEMATOLOGIC:  No history of free bleeding, spontaneous bleeding or clotting.  IMMUNOLOGIC:  No allergies or frequent infections.  NEUROLOGIC: No numbness, tingling, seizures or weakness.  PSYCHIATRIC:  No anxiety or depression.    PHYSICAL EXAMINATION  /56   Pulse 67   Temp 98 °F (36.7 °C) (Temporal)   Resp 20   Ht 175.3 cm (69\")   Wt 90.8 kg (200 lb 3.2 oz)   SpO2 97%   BMI 29.56 kg/m²     Pain Score:  Pain Score    24 1054   PainSc: 0-No pain     PHQ-Score Total:  PHQ-9 Total Score:      ECO  GENERAL:  A well-developed, well-nourished, elderly, white female in no acute distress, again ambulating with the assistance of a cane.  HEENT:  Pupils equally " round and reactive to light. Extraocular muscles intact.  CARDIOVASCULAR:  Regular rate and rhythm. No murmurs, gallops or rubs.  LUNGS:  Clear to auscultation bilaterally.  ABDOMEN:  Soft, nontender, nondistended with positive bowel sounds.  EXTREMITIES:  No clubbing or cyanosis bilaterally. Stable, ~1-2+ edema in the bilateral lower extremities.  SKIN:  No rashes or petechiae.  NEURO:  Cranial nerves grossly intact. No focal deficits.  PSYCH:  Alert and oriented x3.    LABORATORY  Lab Results   Component Value Date    WBC 5.08 06/03/2024    HGB 12.2 06/03/2024    HCT 39.2 06/03/2024    MCV 94.9 06/03/2024     (L) 06/03/2024    NEUTROABS 3.33 06/03/2024       Lab Results   Component Value Date     06/03/2024    K 4.7 06/03/2024     06/03/2024    CO2 23.5 06/03/2024    BUN 23 06/03/2024    CREATININE 1.21 (H) 06/03/2024    GLUCOSE 318 (H) 06/03/2024    CALCIUM 9.3 06/03/2024    AST 14 06/03/2024    ALT 9 06/03/2024    ALKPHOS 126 (H) 06/03/2024    BILITOT 0.6 06/03/2024    PROTEINTOT 6.3 06/03/2024    ALBUMIN 3.8 06/03/2024     CBC (06/03/2024): WBCs: 5.08; HgB: 12.2; Hct: 39.2; platelets: 139  CBC (12/01/2023): WBCs: 6.04; HgB: 13.2; Hct: 41.3; platelets: 176  CBC (08/03/2023): WBCs: 6.20; HgB: 12.5; Hct: 39.2; platelets: 174  CBC (06/01/2023): WBCs: 5.52; HgB: 12.3; Hct: 38.5; platelets: 150  CBC (12/01/2022): WBCs: 7.09; HgB: 12.3; Hct: 38.5; platelets: 163  CBC (06/01/2022): WBCs: 6.29; HgB: 11.0; Hct: 34.8; platelets: 223  CBC (02/21/2022): WBCs: 5.31; HgB: 10.9; Hct: 34.7; platelets: 142  CBC (10/27/2021): WBCs: 5.41; HgB: 9.9; Hct: 33.3; platelets: 183  CBC (10/06/2021): WBCs: 4.85; HgB: 8.7; Hct: 29.8; platelets: 184  CBC (08/18/2021): WBCs: 6.65; HgB: 7.5; Hct: 24.5; platelets: 173    Iron profile (12/01/2022): serum iron: 75; % saturation: 20; TIBC: 377; ferritin: 150.5 ng/mL  Iron profile (06/01/2022): serum iron: 64; % saturation: 19; TIBC: 341; ferritin: 176.6 ng/mL  Iron profile  (02/21/2022): serum iron: 58; % saturation: 15; TIBC: 380; ferritin: 87.74 ng/mL  Iron profile (10/27/2021): serum iron: 44; % saturation: 9; TIBC: 514; ferritin: 57.67 ng/mL    IMAGING  CT abdomen and pelvis without contrast (05/29/2021):  Impression:  1) Adenopathy in the right lower quadrant mesentery concerning for malignancy. There is some adjacent inflammatory fat stranding with one of the nodes. Consider PET/CT for followup.  2) Mild thickening of the rectosigmoid colon could be due to incomplete distention or mild segmental colitis. There is colonic diverticulosis without focal diverticulitis.  3) Grossly normal small bowel and appendix.  4) Cholecystectomy with some mild fat stranding in the gallbladder fossa. This could be due to relatively recent surgery or some mild inflammation. No fluid collection.  5) Additional findings [are nonacute].    NM PET with fusion CT, skull base to mid-thigh (06/14/2021, compared to CT of the abdomen/pelvis dated 05/29/2021):  Impression:  1) Significant interval improvement in the appearance of the large right lower quadrant soft tissue masses. Residual nodules are present but they do not show FDG avidity.  2) Interval development of a large, soft tissue nodule in the left lower quadrant which does show hypermetabolic activity.    CT soft tissue neck with contrast (10/22/2021):  Impression: No cervical lymphadenopathy or evidence of lymphomatous involvement in the neck.    CT chest with contrast (10/22/2021):  Impression: Stable chest. No findings in the CT of the chest to suggest lymphoma.    CT abdomen and pelvis with contrast (10/22/2021):  Impression: The hypermetabolic soft tissue mass in the left abdomen on the PET fusion CT is not demonstrable on today's CT. There is fatty change in the liver with a small amount of ascites around the liver. There are prominent lymph nodes along the iliac lymph node chains in the pelvis that are in the 1 cm size range.    CT abdomen  and pelvis without contrast (05/18/2022, compared to 10/22/2021):  Impression:  1) The heart is enlarged.  2) No enlarged retroperitoneal or mesenteric lymph nodes at this time. No evidence of residual or recurrent lymphoma.  3) Fat-containing umbilical hernia with a diameter of 3.5 cm.    CT chest without contrast (06/03/2024, compared to 10/22/2021):  Impression:  1) Stable exam. No evidence of thoracic adenopathy.  2) Other incidental/nonacute findings stable from previous.    CT abdomen and pelvis without contrast (06/03/2024, compared to 05/18/2022):  Impression:  1) Bulky enlarged nodes in the mesentery and adjacent to the duodenum which are of increased size from prior exams. Index notes detailed above (largest lymph node is 3.31 cm and was previously 1.90 cm. Slightly smaller lymph node is 2.54 cm and was previously 2.37 cm. No bulky inguinal adenopathy identified.    PATHOLOGY  Lymph node, small bowel mesenteric #1 (08/12/2021):  Follicular lymphoma, grade 2.    Lymph node, small bowel mesenteric #2 (08/12/2021):  Follicular lymphoma, grade 2.    Small bowel segment, proximal ileum (08/12/2021):  Follicular lymphoma, grade 2. Small bowel also involved by follicular lymphoma. Viable surgical margins.    IMPRESSION AND PLAN  Ms. Justin is an 83 y.o., white female with:  Follicular lymphoma: Diagnosed in midsummer 2021 with, technically, stage IV (a solid organ, the small bowel, was involved), but now still mostly resected disease. I have had multiple, long discussions with the patient (+/- her daughter) since the time of her initial consultation in our clinic (on 10/06/2021) regarding this diagnosis and, in general terms, its prognosis. The postoperative CT scans of the neck, chest, abdomen and pelvis (performed on 10/22/2021 and summarized above) showed no evidence of active lymphoma (a few, prominent, but only ~1 cm in size, lymph nodes in the iliac chains were the only notable findings); and this continued  to be the case on the repeat CT of the abdomen and pelvis performed on 05/18/2022 (and also summarized above). The most recent repeat CT scans (of the chest, abdomen and pelvis without contrast), performed this morning (06/03/2024; summarized above) shows some growth in a couple of lymph nodes within the mesentery and adjacent to the duodenum; however, she remains completely asymptomatic. Given this, continued, expectant monitoring alone remains recommended. Particularly since she is (now) 83 years old, this indolent disease very well may never require any specific treatment again during her lifetime (although, even if it does, a number of options are available to us). We will see her back in clinic in six months (late November) with a CBC and CMP for a symptom check/wellness visit. In the continued absence of a concerning clinical change (such as recurrent symptoms, etc), we will plan to repeat imaging again in ~one year (late Spring 2025), to keep a closer watch on the mesenteric nodes. The patient and her daughter were in agreement with these plans.    It is a pleasure to participate in Ms. Justin's care. Please do not hesitate to call with any questions or concerns that you may have.    A total of 30 minutes were spent coordinating this patient’s care in clinic today; more than 50% of this time was face-to-face with the patient and her daughter, reviewing her interim medical history, discussing the results of this morning's repeat CT scans and counseling on the current followup plan. All questions were answered to their satisfaction.    FOLLOW UP  Return to our clinic in 6 months (~late November 2024) with a CBC and CMP.            This document was electronically signed by MYAH Vinson MD Rody 3, 2024 11:39 EDT      CC: MD Shabnam Cline MD

## 2024-12-03 ENCOUNTER — OFFICE VISIT (OUTPATIENT)
Dept: ONCOLOGY | Facility: CLINIC | Age: 84
End: 2024-12-03
Payer: MEDICARE

## 2024-12-03 ENCOUNTER — LAB (OUTPATIENT)
Dept: ONCOLOGY | Facility: CLINIC | Age: 84
End: 2024-12-03
Payer: MEDICARE

## 2024-12-03 VITALS
RESPIRATION RATE: 18 BRPM | TEMPERATURE: 97.1 F | HEIGHT: 69 IN | WEIGHT: 201 LBS | OXYGEN SATURATION: 97 % | BODY MASS INDEX: 29.77 KG/M2 | HEART RATE: 70 BPM | SYSTOLIC BLOOD PRESSURE: 130 MMHG | DIASTOLIC BLOOD PRESSURE: 63 MMHG

## 2024-12-03 DIAGNOSIS — C82.13 FOLLICULAR LYMPHOMA GRADE II OF INTRA-ABDOMINAL LYMPH NODES: Primary | ICD-10-CM

## 2024-12-03 DIAGNOSIS — C82.13 FOLLICULAR LYMPHOMA GRADE II OF INTRA-ABDOMINAL LYMPH NODES: ICD-10-CM

## 2024-12-03 LAB
ALBUMIN SERPL-MCNC: 4.1 G/DL (ref 3.5–5.2)
ALBUMIN/GLOB SERPL: 1.6 G/DL
ALP SERPL-CCNC: 142 U/L (ref 39–117)
ALT SERPL W P-5'-P-CCNC: 11 U/L (ref 1–33)
ANION GAP SERPL CALCULATED.3IONS-SCNC: 9 MMOL/L (ref 5–15)
AST SERPL-CCNC: 15 U/L (ref 1–32)
BASOPHILS # BLD AUTO: 0.03 10*3/MM3 (ref 0–0.2)
BASOPHILS NFR BLD AUTO: 0.5 % (ref 0–1.5)
BILIRUB SERPL-MCNC: 0.9 MG/DL (ref 0–1.2)
BUN SERPL-MCNC: 16 MG/DL (ref 8–23)
BUN/CREAT SERPL: 16.2 (ref 7–25)
CALCIUM SPEC-SCNC: 9.9 MG/DL (ref 8.6–10.5)
CHLORIDE SERPL-SCNC: 100 MMOL/L (ref 98–107)
CO2 SERPL-SCNC: 26 MMOL/L (ref 22–29)
CREAT SERPL-MCNC: 0.99 MG/DL (ref 0.57–1)
DEPRECATED RDW RBC AUTO: 47.5 FL (ref 37–54)
EGFRCR SERPLBLD CKD-EPI 2021: 55.6 ML/MIN/1.73
EOSINOPHIL # BLD AUTO: 0.18 10*3/MM3 (ref 0–0.4)
EOSINOPHIL NFR BLD AUTO: 3.2 % (ref 0.3–6.2)
ERYTHROCYTE [DISTWIDTH] IN BLOOD BY AUTOMATED COUNT: 13.9 % (ref 12.3–15.4)
GLOBULIN UR ELPH-MCNC: 2.5 GM/DL
GLUCOSE SERPL-MCNC: 238 MG/DL (ref 65–99)
HCT VFR BLD AUTO: 40.3 % (ref 34–46.6)
HGB BLD-MCNC: 12.5 G/DL (ref 12–15.9)
IMM GRANULOCYTES # BLD AUTO: 0.01 10*3/MM3 (ref 0–0.05)
IMM GRANULOCYTES NFR BLD AUTO: 0.2 % (ref 0–0.5)
LYMPHOCYTES # BLD AUTO: 1.26 10*3/MM3 (ref 0.7–3.1)
LYMPHOCYTES NFR BLD AUTO: 22.7 % (ref 19.6–45.3)
MCH RBC QN AUTO: 29.1 PG (ref 26.6–33)
MCHC RBC AUTO-ENTMCNC: 31 G/DL (ref 31.5–35.7)
MCV RBC AUTO: 93.9 FL (ref 79–97)
MONOCYTES # BLD AUTO: 0.58 10*3/MM3 (ref 0.1–0.9)
MONOCYTES NFR BLD AUTO: 10.4 % (ref 5–12)
NEUTROPHILS NFR BLD AUTO: 3.5 10*3/MM3 (ref 1.7–7)
NEUTROPHILS NFR BLD AUTO: 63 % (ref 42.7–76)
NRBC BLD AUTO-RTO: 0 /100 WBC (ref 0–0.2)
PLATELET # BLD AUTO: 170 10*3/MM3 (ref 140–450)
PMV BLD AUTO: 9.9 FL (ref 6–12)
POTASSIUM SERPL-SCNC: 4.8 MMOL/L (ref 3.5–5.2)
PROT SERPL-MCNC: 6.6 G/DL (ref 6–8.5)
RBC # BLD AUTO: 4.29 10*6/MM3 (ref 3.77–5.28)
SODIUM SERPL-SCNC: 135 MMOL/L (ref 136–145)
WBC NRBC COR # BLD AUTO: 5.56 10*3/MM3 (ref 3.4–10.8)

## 2024-12-03 PROCEDURE — 80053 COMPREHEN METABOLIC PANEL: CPT | Performed by: INTERNAL MEDICINE

## 2024-12-03 PROCEDURE — 36415 COLL VENOUS BLD VENIPUNCTURE: CPT | Performed by: INTERNAL MEDICINE

## 2024-12-03 PROCEDURE — 85025 COMPLETE CBC W/AUTO DIFF WBC: CPT | Performed by: INTERNAL MEDICINE

## 2024-12-03 NOTE — PROGRESS NOTES
"  Name:  Rachana Justin  :  1940  Date:  12/3/2024     REFERRING PHYSICIAN  Troy Elkins MD    PRIMARY CARE PHYSICIAN  Shabnam De La Rosa MD    REASON FOR FOLLOWUP  1. 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    Dear Troy,    HISTORY OF PRESENT ILLNESS:   I saw Ms. Justin in follow up today in our hematology/oncology clinic. As you are aware, she is a pleasant, 84 y.o., white female with a history of hypertension, diabetes and COPD who presented to our ED on 2021 for abdominal pain. CT scans identified a right lower quadrant, mesenteric mass. Follow up was arranged with you in your clinic. A NM PET scan on 2021 showed that, in the interim, this mass had \"resolved\" while one in the left lower abdomen had \"developed\" (which likely represented the same lymph node(s) changing position, due to mobility of the mesentery). You ultimately took her to the OR on 2021 for a diagnostic laparoscopy (which had to be converted to an open laparotomy due to the location of the lymphadenopathy at the mesenteric root). A couple of lymph nodes and a small segment of the proximal ileum were successfully excised, and the final pathology results were consistent with grade 2 follicular lymphoma. Though she developed a postoperative ileus and anemia, she was able to be discharged on 2021. She was subsequently referred to our clinic for further evaluation and management. Routine monitoring was, and remains, recommended.    INTERIM HISTORY:  Ms. Justin returns to clinic today for follow up by herself (her daughter typically accompanies her, but she had her own appointment today). She once again denies any B-symptoms (fevers, chills, night sweats, unexplained weight loss). She continues to feel overall well and once again has no new or specific complaints (she states that she feels \"blessed\").    Past Medical History:   Diagnosis Date    Ambulates with cane     " Arthritis     Atrial fibrillation     COPD (chronic obstructive pulmonary disease)     COVID-19 virus infection 2020    Diabetes mellitus     Gallstones     GERD (gastroesophageal reflux disease)     Heart disease     Hyperlipidemia     Hypertension     Mesenteric lymphadenopathy 2021    Added automatically from request for surgery [id number]    Pulmonary hypertension     Status post laparoscopic cholecystectomy 3/9/2021       Past Surgical History:   Procedure Laterality Date    CATARACT EXTRACTION Bilateral     CHOLECYSTECTOMY N/A 3/26/2021    Procedure: CHOLECYSTECTOMY LAPAROSCOPIC (REQUESTS MARIVEL NEWMAN);  Surgeon: Keagan Lopez MD;  Location: [facility] OR;  Service: General;  Laterality: N/A;    COLONOSCOPY N/A 2021    Procedure: COLONOSCOPY;  Surgeon: Troy Elkins MD;  Location: [facility] OR;  Service: Gastroenterology;  Laterality: N/A;    DIAGNOSTIC LAPAROSCOPY N/A 2021    Procedure: DIAGNOSTIC LAPAROSCOPY CONVERTED TO  OPEN @ 918 WITH SMALL BOWEL RESECTION;  Surgeon: Troy Elkins MD;  Location: [facility] OR;  Service: General;  Laterality: N/A;    ENDOSCOPY N/A 2021    Procedure: ESOPHAGOGASTRODUODENOSCOPY WITH ANESTHESIA;  Surgeon: Troy Elkins MD;  Location: [facility] OR;  Service: Gastroenterology;  Laterality: N/A;    FRACTURE SURGERY      right foot orif    PACEMAKER IMPLANTATION Left 2018       Social History     Socioeconomic History    Marital status:    Tobacco Use    Smoking status: Former     Current packs/day: 0.00     Average packs/day: 0.5 packs/day for 10.0 years (5.0 ttl pk-yrs)     Types: Cigarettes     Start date: 3/1/1953     Quit date: 3/1/1963     Years since quittin.8    Smokeless tobacco: Never   Vaping Use    Vaping status: Never Used   Substance and Sexual Activity    Alcohol use: No    Drug use: Never    Sexual activity: Defer       Family History   Problem Relation Age of Onset    Breast cancer Sister     Breast cancer  Sister     Breast cancer Sister         40's    Cancer Mother        Allergies   Allergen Reactions    Nitrofurantoin GI Intolerance       Current Outpatient Medications   Medication Sig Dispense Refill    acetaminophen (TYLENOL) 325 MG tablet Take 2 tablets by mouth Every 4 (Four) Hours As Needed for Mild Pain . 30 tablet 0    amLODIPine (NORVASC) 5 MG tablet Take 1 tablet by mouth Daily. 30 tablet 3    apixaban (ELIQUIS) 5 MG tablet tablet Take 1 tablet by mouth 2 (Two) Times a Day. ON HOLD PER DR CEJA      aspirin 81 MG chewable tablet Chew 1 tablet Daily.      atorvastatin (LIPITOR) 10 MG tablet Take 1 tablet by mouth Every Night. 30 tablet 3    bumetanide (BUMEX) 1 MG tablet Take 1 tablet by mouth Daily.      carvedilol (COREG) 12.5 MG tablet Take 1 tablet by mouth 2 (Two) Times a Day With Meals.      docusate sodium (Colace) 100 MG capsule Take 1 capsule by mouth 2 (Two) Times a Day As Needed for Constipation. 60 capsule 0    fesoterodine fumarate (TOVIAZ ER) 8 MG tablet sustained-release 24 hour tablet Take 1 tablet by mouth Daily.      furosemide (LASIX) 40 MG tablet Take 1 tablet by mouth 3 (Three) Times a Week if Needed (FLUID RETENTION).      Insulin Glargine, 1 Unit Dial, (TOUJEO) 300 UNIT/ML solution pen-injector injection Inject 50 Units under the skin into the appropriate area as directed Daily.      losartan (COZAAR) 50 MG tablet Take 1 tablet by mouth Daily.      Pramoxine HCl, Perianal, (Proctofoam) 1 % foam Insert  into the rectum Every 6 (Six) Hours As Needed for Hemorrhoids. 15 g 0    Semaglutide, 1 MG/DOSE, (Ozempic, 1 MG/DOSE,) 4 MG/3ML solution pen-injector Inject 1 mg under the skin into the appropriate area as directed Every 7 (Seven) Days.      vitamin D (ERGOCALCIFEROL) 1.25 MG (45199 UT) capsule capsule Take 1 capsule by mouth 1 (One) Time Per Week.      ferrous sulfate 325 (65 FE) MG tablet Take 1 tablet by mouth 2 (two) times a day. (Patient not taking: Reported on 12/3/2024) 60  tablet 2    HYDROcodone-acetaminophen (NORCO) 5-325 MG per tablet Take 1 tablet by mouth Every 8 (Eight) Hours As Needed for Severe Pain . (Patient not taking: Reported on 12/3/2024) 8 tablet 0    isosorbide mononitrate (IMDUR) 30 MG 24 hr tablet Take 1 tablet by mouth Daily. (Patient not taking: Reported on 12/3/2024) 30 tablet 0    omeprazole (priLOSEC) 40 MG capsule Take 1 capsule by mouth Daily As Needed (stomach). (Patient not taking: Reported on 12/3/2024)      potassium chloride ER (K-TAB) 20 MEQ tablet controlled-release ER tablet Take 1 tablet by mouth 3 (Three) Times a Week. Prior to [facility] [place] Admission, Patient was on: only takes when she takes Furosemide (Patient not taking: Reported on 12/3/2024)      spironolactone (ALDACTONE) 25 MG tablet Take 1 tablet by mouth Daily. (Patient not taking: Reported on 12/3/2024)      vitamin B-12 (CYANOCOBALAMIN) 1000 MCG tablet Take 1 tablet by mouth Daily. (Patient not taking: Reported on 12/3/2024)       No current facility-administered medications for this visit.     REVIEW OF SYSTEMS  CONSTITUTIONAL:  No fever, chills, night sweats or fatigue.  EYES:  No blurry vision, diplopia or other vision changes.  ENT:  No hearing loss, nosebleeds or sore throat.  CARDIOVASCULAR:  No palpitations, arrhythmia, syncopal episodes or edema.  PULMONARY:  No hemoptysis, wheezing, chronic cough or shortness of breath.  GASTROINTESTINAL:  No nausea or vomiting. No constipation or diarrhea. No abdominal pain.  GENITOURINARY:  No hematuria, kidney stones or frequent urination.  MUSCULOSKELETAL:  No joint or back pains.  INTEGUMENTARY: No rashes or pruritus.  ENDOCRINE:  No excessive thirst or hot flashes.  HEMATOLOGIC:  No history of free bleeding, spontaneous bleeding or clotting.  IMMUNOLOGIC:  No allergies or frequent infections.  NEUROLOGIC: No numbness, tingling, seizures or weakness.  PSYCHIATRIC:  No anxiety or depression.    PHYSICAL EXAMINATION  /63   Pulse 70   Temp  "97.1 °F (36.2 °C) (Temporal)   Resp 18   Ht 175.3 cm (69\")   Wt 91.2 kg (201 lb)   SpO2 97%   BMI 29.68 kg/m²     Pain Score:  Pain Score    24 1104   PainSc: 0-No pain     PHQ-Score Total:  PHQ-9 Total Score:      ECO  GENERAL:  A well-developed, well-nourished, elderly, white female in no acute distress, once again ambulating with the assistance of a cane.  HEENT:  Pupils equally round and reactive to light. Extraocular muscles intact.  CARDIOVASCULAR:  Regular rate and rhythm. No murmurs, gallops or rubs.  LUNGS:  Clear to auscultation bilaterally.  ABDOMEN:  Soft, nontender, nondistended with positive bowel sounds.  EXTREMITIES:  No clubbing or cyanosis bilaterally. Stable, ~1-2+ edema in the bilateral lower extremities.  SKIN:  No rashes or petechiae.  NEURO:  Cranial nerves grossly intact. No focal deficits.  PSYCH:  Alert and oriented x3.    LABORATORY  Lab Results   Component Value Date    WBC 5.56 2024    HGB 12.5 2024    HCT 40.3 2024    MCV 93.9 2024     2024    NEUTROABS 3.50 2024       Lab Results   Component Value Date     2024    K 4.7 2024     2024    CO2 23.5 2024    BUN 23 2024    CREATININE 1.21 (H) 2024    GLUCOSE 318 (H) 2024    CALCIUM 9.3 2024    AST 14 2024    ALT 9 2024    ALKPHOS 126 (H) 2024    BILITOT 0.6 2024    PROTEINTOT 6.3 2024    ALBUMIN 3.8 2024     CBC (2024): WBCs: 5.56; HgB: 12.5; Hct: 40.3; platelets: 170  CBC (2024): WBCs: 5.08; HgB: 12.2; Hct: 39.2; platelets: 139  CBC (2023): WBCs: 6.04; HgB: 13.2; Hct: 41.3; platelets: 176  CBC (2023): WBCs: 6.20; HgB: 12.5; Hct: 39.2; platelets: 174  CBC (2023): WBCs: 5.52; HgB: 12.3; Hct: 38.5; platelets: 150  CBC (2022): WBCs: 7.09; HgB: 12.3; Hct: 38.5; platelets: 163  CBC (2022): WBCs: 6.29; HgB: 11.0; Hct: 34.8; platelets: 223  CBC " (02/21/2022): WBCs: 5.31; HgB: 10.9; Hct: 34.7; platelets: 142  CBC (10/27/2021): WBCs: 5.41; HgB: 9.9; Hct: 33.3; platelets: 183  CBC (10/06/2021): WBCs: 4.85; HgB: 8.7; Hct: 29.8; platelets: 184  CBC (08/18/2021): WBCs: 6.65; HgB: 7.5; Hct: 24.5; platelets: 173    Iron profile (12/01/2022): serum iron: 75; % saturation: 20; TIBC: 377; ferritin: 150.5 ng/mL  Iron profile (06/01/2022): serum iron: 64; % saturation: 19; TIBC: 341; ferritin: 176.6 ng/mL  Iron profile (02/21/2022): serum iron: 58; % saturation: 15; TIBC: 380; ferritin: 87.74 ng/mL  Iron profile (10/27/2021): serum iron: 44; % saturation: 9; TIBC: 514; ferritin: 57.67 ng/mL    IMAGING  CT abdomen and pelvis without contrast (05/29/2021):  Impression:  1) Adenopathy in the right lower quadrant mesentery concerning for malignancy. There is some adjacent inflammatory fat stranding with one of the nodes. Consider PET/CT for followup.  2) Mild thickening of the rectosigmoid colon could be due to incomplete distention or mild segmental colitis. There is colonic diverticulosis without focal diverticulitis.  3) Grossly normal small bowel and appendix.  4) Cholecystectomy with some mild fat stranding in the gallbladder fossa. This could be due to relatively recent surgery or some mild inflammation. No fluid collection.  5) Additional findings [are nonacute].    NM PET with fusion CT, skull base to mid-thigh (06/14/2021, compared to CT of the abdomen/pelvis dated 05/29/2021):  Impression:  1) Significant interval improvement in the appearance of the large right lower quadrant soft tissue masses. Residual nodules are present but they do not show FDG avidity.  2) Interval development of a large, soft tissue nodule in the left lower quadrant which does show hypermetabolic activity.    CT soft tissue neck with contrast (10/22/2021):  Impression: No cervical lymphadenopathy or evidence of lymphomatous involvement in the neck.    CT chest with contrast  (10/22/2021):  Impression: Stable chest. No findings in the CT of the chest to suggest lymphoma.    CT abdomen and pelvis with contrast (10/22/2021):  Impression: The hypermetabolic soft tissue mass in the left abdomen on the PET fusion CT is not demonstrable on today's CT. There is fatty change in the liver with a small amount of ascites around the liver. There are prominent lymph nodes along the iliac lymph node chains in the pelvis that are in the 1 cm size range.    CT abdomen and pelvis without contrast (05/18/2022, compared to 10/22/2021):  Impression:  1) The heart is enlarged.  2) No enlarged retroperitoneal or mesenteric lymph nodes at this time. No evidence of residual or recurrent lymphoma.  3) Fat-containing umbilical hernia with a diameter of 3.5 cm.    CT chest without contrast (06/03/2024, compared to 10/22/2021):  Impression:  1) Stable exam. No evidence of thoracic adenopathy.  2) Other incidental/nonacute findings stable from previous.    CT abdomen and pelvis without contrast (06/03/2024, compared to 05/18/2022):  Impression:  1) Bulky enlarged nodes in the mesentery and adjacent to the duodenum which are of increased size from prior exams. Index notes detailed above (largest lymph node is 3.31 cm and was previously 1.90 cm. Slightly smaller lymph node is 2.54 cm and was previously 2.37 cm. No bulky inguinal adenopathy identified.    PATHOLOGY  Lymph node, small bowel mesenteric #1 (08/12/2021):  Follicular lymphoma, grade 2.    Lymph node, small bowel mesenteric #2 (08/12/2021):  Follicular lymphoma, grade 2.    Small bowel segment, proximal ileum (08/12/2021):  Follicular lymphoma, grade 2. Small bowel also involved by follicular lymphoma. Viable surgical margins.    IMPRESSION AND PLAN  Ms. Justin is an 84 y.o., white female with:  Follicular lymphoma: Diagnosed in midsummer 2021 with, technically, stage IV (a solid organ, the small bowel, was involved), but now still mostly resected disease. I  have had multiple, long discussions with the patient (+/- her daughter) since the time of her initial consultation in our clinic (on 10/06/2021) regarding this diagnosis and, in general terms, its prognosis. The postoperative CT scans of the neck, chest, abdomen and pelvis (performed on 10/22/2021 and summarized above) showed no evidence of active lymphoma (a few, prominent, but only ~1 cm in size, lymph nodes in the iliac chains were the only notable findings); and this continued to be the case on the repeat CT of the abdomen and pelvis performed on 05/18/2022 (and also summarized above). The most recent repeat CT scans (of the chest, abdomen and pelvis without contrast), performed on 06/03/2024 (and summarized above), showed some growth in a couple of lymph nodes within the mesentery and adjacent to the duodenum; however, she remains completely asymptomatic. Given this, continued, expectant monitoring alone remains recommended. Particularly since she is (now) 84 years old, this indolent disease very well may never require any specific treatment again during her lifetime (although, even if it does, a number of options are available to us). We will see her back in clinic in six months (May 2025) with a CBC, CMP and repeat CT scans of the chest, abdomen and pelvis without contrast for a symptom check/wellness visit and to keep a close watch on the mesenteric nodes. The patient was in agreement with these plans.    It is a pleasure to participate in Ms. Justin's care. Please do not hesitate to call with any questions or concerns that you may have.    A total of 30 minutes were spent coordinating this patient’s care in clinic today; more than 50% of this time was face-to-face with the patient, reviewing her interim medical history and counseling on the current followup plan. All questions were answered to her satisfaction.    FOLLOW UP  Return to our clinic in 6 months (May 2025) with a CBC, CMP and repeat CTs of the  chest, abdomen and pelvis without contrast.            This document was electronically signed by MYAH Vinson MD December 3, 2024 11:35 EST      CC: MD Shabnam Cline MD

## 2024-12-03 NOTE — PROGRESS NOTES
Venipuncture Blood Specimen Collection  Venipuncture performed in right arm by Lillian Mcdonnell MA with good hemostasis. Patient tolerated the procedure well without complications.   12/03/24   Lillian Mcdonnell MA

## 2025-03-20 ENCOUNTER — TRANSCRIBE ORDERS (OUTPATIENT)
Dept: ADMINISTRATIVE | Facility: HOSPITAL | Age: 85
End: 2025-03-20
Payer: MEDICARE

## 2025-03-20 DIAGNOSIS — Z12.31 VISIT FOR SCREENING MAMMOGRAM: Primary | ICD-10-CM

## 2025-05-08 ENCOUNTER — HOSPITAL ENCOUNTER (OUTPATIENT)
Dept: MAMMOGRAPHY | Facility: HOSPITAL | Age: 85
Discharge: HOME | End: 2025-05-08
Admitting: INTERNAL MEDICINE
Payer: MEDICARE

## 2025-05-08 DIAGNOSIS — Z12.31 VISIT FOR SCREENING MAMMOGRAM: ICD-10-CM

## 2025-05-08 PROCEDURE — 77063 BREAST TOMOSYNTHESIS BI: CPT

## 2025-05-08 PROCEDURE — 77067 SCR MAMMO BI INCL CAD: CPT

## 2025-05-08 PROCEDURE — 77063 BREAST TOMOSYNTHESIS BI: CPT | Performed by: RADIOLOGY

## 2025-05-08 PROCEDURE — 77067 SCR MAMMO BI INCL CAD: CPT | Performed by: RADIOLOGY

## 2025-05-20 ENCOUNTER — TELEPHONE (OUTPATIENT)
Dept: ONCOLOGY | Facility: CLINIC | Age: 85
End: 2025-05-20

## 2025-05-20 NOTE — TELEPHONE ENCOUNTER
Caller: Rachana Justin    Relationship: Self    Best call back number:     [phone]     What is the best time to reach you: ANYTIME    Who are you requesting to speak with (clinical staff, provider,  specific staff member): NON-CLINICAL    What was the call regarding: PT HAD A DEATH IN THE FAMILY AND THE  WILL BE ON  SO THE PT WILL NOT BE ABLE TO DO THE CT. DOES THE PT NEED TO HAVE THE CT DONE BEFORE SEEING DR CARTER OR CAN SHE KEEP APPT WITH DR CARTER?    IF NOT THEN PT WILL NEED THE CT AND FOLLOW UP R/S. PLEASE ADVISE PT IF SHE NEEDS TO CHANGE FOLLOW UP.

## 2025-05-23 ENCOUNTER — HOSPITAL ENCOUNTER (OUTPATIENT)
Facility: HOSPITAL | Age: 85
Discharge: HOME | End: 2025-05-23
Payer: MEDICARE

## 2025-05-23 DIAGNOSIS — C82.13 FOLLICULAR LYMPHOMA GRADE II OF INTRA-ABDOMINAL LYMPH NODES: ICD-10-CM

## 2025-05-23 PROCEDURE — 71250 CT THORAX DX C-: CPT

## 2025-05-23 PROCEDURE — 74176 CT ABD & PELVIS W/O CONTRAST: CPT

## 2025-05-27 ENCOUNTER — OFFICE VISIT (OUTPATIENT)
Dept: ONCOLOGY | Facility: CLINIC | Age: 85
End: 2025-05-27
Payer: MEDICARE

## 2025-05-27 ENCOUNTER — LAB (OUTPATIENT)
Dept: ONCOLOGY | Facility: CLINIC | Age: 85
End: 2025-05-27
Payer: MEDICARE

## 2025-05-27 VITALS
OXYGEN SATURATION: 95 % | HEIGHT: 69 IN | HEART RATE: 77 BPM | DIASTOLIC BLOOD PRESSURE: 65 MMHG | BODY MASS INDEX: 30.04 KG/M2 | RESPIRATION RATE: 18 BRPM | WEIGHT: 202.8 LBS | SYSTOLIC BLOOD PRESSURE: 111 MMHG | TEMPERATURE: 97.5 F

## 2025-05-27 DIAGNOSIS — C82.13 FOLLICULAR LYMPHOMA GRADE II OF INTRA-ABDOMINAL LYMPH NODES: ICD-10-CM

## 2025-05-27 DIAGNOSIS — C82.13 FOLLICULAR LYMPHOMA GRADE II OF INTRA-ABDOMINAL LYMPH NODES: Primary | ICD-10-CM

## 2025-05-27 LAB
ALBUMIN SERPL-MCNC: 3.9 G/DL (ref 3.5–5.2)
ALBUMIN/GLOB SERPL: 1.7 G/DL
ALP SERPL-CCNC: 139 U/L (ref 39–117)
ALT SERPL W P-5'-P-CCNC: 11 U/L (ref 1–33)
ANION GAP SERPL CALCULATED.3IONS-SCNC: 10.4 MMOL/L (ref 5–15)
AST SERPL-CCNC: 18 U/L (ref 1–32)
BASOPHILS # BLD AUTO: 0.02 10*3/MM3 (ref 0–0.2)
BASOPHILS NFR BLD AUTO: 0.5 % (ref 0–1.5)
BILIRUB SERPL-MCNC: 0.7 MG/DL (ref 0–1.2)
BUN SERPL-MCNC: 21 MG/DL (ref 8–23)
BUN/CREAT SERPL: 20.4 (ref 7–25)
CALCIUM SPEC-SCNC: 9.5 MG/DL (ref 8.6–10.5)
CHLORIDE SERPL-SCNC: 104 MMOL/L (ref 98–107)
CO2 SERPL-SCNC: 23.6 MMOL/L (ref 22–29)
CREAT SERPL-MCNC: 1.03 MG/DL (ref 0.57–1)
DEPRECATED RDW RBC AUTO: 46.9 FL (ref 37–54)
EGFRCR SERPLBLD CKD-EPI 2021: 53.1 ML/MIN/1.73
EOSINOPHIL # BLD AUTO: 0.01 10*3/MM3 (ref 0–0.4)
EOSINOPHIL NFR BLD AUTO: 0.2 % (ref 0.3–6.2)
ERYTHROCYTE [DISTWIDTH] IN BLOOD BY AUTOMATED COUNT: 13.7 % (ref 12.3–15.4)
GLOBULIN UR ELPH-MCNC: 2.3 GM/DL
GLUCOSE SERPL-MCNC: 197 MG/DL (ref 65–99)
HCT VFR BLD AUTO: 40.2 % (ref 34–46.6)
HGB BLD-MCNC: 12.3 G/DL (ref 12–15.9)
IMM GRANULOCYTES # BLD AUTO: 0.02 10*3/MM3 (ref 0–0.05)
IMM GRANULOCYTES NFR BLD AUTO: 0.5 % (ref 0–0.5)
LYMPHOCYTES # BLD AUTO: 0.93 10*3/MM3 (ref 0.7–3.1)
LYMPHOCYTES NFR BLD AUTO: 21.4 % (ref 19.6–45.3)
MCH RBC QN AUTO: 28.7 PG (ref 26.6–33)
MCHC RBC AUTO-ENTMCNC: 30.6 G/DL (ref 31.5–35.7)
MCV RBC AUTO: 93.7 FL (ref 79–97)
MONOCYTES # BLD AUTO: 0.52 10*3/MM3 (ref 0.1–0.9)
MONOCYTES NFR BLD AUTO: 12 % (ref 5–12)
NEUTROPHILS NFR BLD AUTO: 2.85 10*3/MM3 (ref 1.7–7)
NEUTROPHILS NFR BLD AUTO: 65.4 % (ref 42.7–76)
NRBC BLD AUTO-RTO: 0 /100 WBC (ref 0–0.2)
PLATELET # BLD AUTO: 150 10*3/MM3 (ref 140–450)
PMV BLD AUTO: 10.5 FL (ref 6–12)
POTASSIUM SERPL-SCNC: 4.2 MMOL/L (ref 3.5–5.2)
PROT SERPL-MCNC: 6.2 G/DL (ref 6–8.5)
RBC # BLD AUTO: 4.29 10*6/MM3 (ref 3.77–5.28)
SODIUM SERPL-SCNC: 138 MMOL/L (ref 136–145)
WBC NRBC COR # BLD AUTO: 4.35 10*3/MM3 (ref 3.4–10.8)

## 2025-05-27 PROCEDURE — 85025 COMPLETE CBC W/AUTO DIFF WBC: CPT | Performed by: INTERNAL MEDICINE

## 2025-05-27 PROCEDURE — 80053 COMPREHEN METABOLIC PANEL: CPT | Performed by: INTERNAL MEDICINE

## 2025-05-27 NOTE — PROGRESS NOTES
Venipuncture Blood Specimen Collection  Venipuncture performed in left arm by Jeanette Rodriguez MA with good hemostasis. Patient tolerated the procedure well without complications.   05/27/25   Jeanette Rodriguez MA

## 2025-05-27 NOTE — PROGRESS NOTES
"  Name:  Rachana Justin  :  1940  Date:  2025     REFERRING PHYSICIAN  Troy Elkins MD    PRIMARY CARE PHYSICIAN  Shabnam De La Rosa MD    REASON FOR FOLLOWUP  1. 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    Dear Troy,    HISTORY OF PRESENT ILLNESS:   I saw Ms. Justin in follow up today in our hematology/oncology clinic. As you are aware, she is a pleasant, 84 y.o., white female with a history of hypertension, diabetes and COPD who presented to our ED on 2021 for abdominal pain. CT scans identified a right lower quadrant, mesenteric mass. Follow up was arranged with you in your clinic. A NM PET scan on 2021 showed that, in the interim, this mass had \"resolved\" while one in the left lower abdomen had \"developed\" (which likely represented the same lymph node(s) changing position, due to mobility of the mesentery). You ultimately took her to the OR on 2021 for a diagnostic laparoscopy (which had to be converted to an open laparotomy due to the location of the lymphadenopathy at the mesenteric root). A couple of lymph nodes and a small segment of the proximal ileum were successfully excised, and the final pathology results were consistent with grade 2 follicular lymphoma. Though she developed a postoperative ileus and anemia, she was able to be discharged on 2021. She was subsequently referred to our clinic for further evaluation and management. Routine monitoring was, and remains, recommended.    INTERIM HISTORY:  Ms. Justin returns to clinic today for follow up again accompanied by her daughter. She once again denies any B-symptoms (fevers, chills, night sweats, unexplained weight loss). She continues to feel overall well and once again has no new or specific complaints (she lost a great grandson to the recent tornado, but she again states that she feels \"blessed\").    Past Medical History:   Diagnosis Date    Ambulates with cane     " Arthritis     Atrial fibrillation     COPD (chronic obstructive pulmonary disease)     COVID-19 virus infection 2020    Diabetes mellitus     Gallstones     GERD (gastroesophageal reflux disease)     Heart disease     Hyperlipidemia     Hypertension     Mesenteric lymphadenopathy 2021    Added automatically from request for surgery [id number]    Pulmonary hypertension     Status post laparoscopic cholecystectomy 3/9/2021       Past Surgical History:   Procedure Laterality Date    CATARACT EXTRACTION Bilateral     CHOLECYSTECTOMY N/A 3/26/2021    Procedure: CHOLECYSTECTOMY LAPAROSCOPIC (REQUESTS MARIVEL NEWMAN);  Surgeon: Keagan Lopez MD;  Location: [facility] OR;  Service: General;  Laterality: N/A;    COLONOSCOPY N/A 2021    Procedure: COLONOSCOPY;  Surgeon: Troy Elkins MD;  Location: [facility] OR;  Service: Gastroenterology;  Laterality: N/A;    DIAGNOSTIC LAPAROSCOPY N/A 2021    Procedure: DIAGNOSTIC LAPAROSCOPY CONVERTED TO  OPEN @ 918 WITH SMALL BOWEL RESECTION;  Surgeon: Troy Elkins MD;  Location: [facility] OR;  Service: General;  Laterality: N/A;    ENDOSCOPY N/A 2021    Procedure: ESOPHAGOGASTRODUODENOSCOPY WITH ANESTHESIA;  Surgeon: Troy Elkins MD;  Location: [facility] OR;  Service: Gastroenterology;  Laterality: N/A;    FRACTURE SURGERY      right foot orif    PACEMAKER IMPLANTATION Left 2018       Social History     Socioeconomic History    Marital status:    Tobacco Use    Smoking status: Former     Current packs/day: 0.00     Average packs/day: 0.5 packs/day for 10.0 years (5.0 ttl pk-yrs)     Types: Cigarettes     Start date: 3/1/1953     Quit date: 3/1/1963     Years since quittin.2    Smokeless tobacco: Never   Vaping Use    Vaping status: Never Used   Substance and Sexual Activity    Alcohol use: No    Drug use: Never    Sexual activity: Defer       Family History   Problem Relation Age of Onset    Breast cancer Sister     Breast cancer  Sister     Breast cancer Sister         40's    Cancer Mother        Allergies   Allergen Reactions    Nitrofurantoin GI Intolerance     Other reaction(s): GI Intolerance       Current Outpatient Medications   Medication Sig Dispense Refill    acetaminophen (TYLENOL) 325 MG tablet Take 2 tablets by mouth Every 4 (Four) Hours As Needed for Mild Pain . 30 tablet 0    amLODIPine (NORVASC) 5 MG tablet Take 1 tablet by mouth Daily. 30 tablet 3    apixaban (ELIQUIS) 5 MG tablet tablet Take 1 tablet by mouth 2 (Two) Times a Day. ON HOLD PER DR CEJA      aspirin 81 MG chewable tablet Chew 1 tablet Daily.      atorvastatin (LIPITOR) 10 MG tablet Take 1 tablet by mouth Every Night. 30 tablet 3    bumetanide (BUMEX) 1 MG tablet Take 1 tablet by mouth Daily.      carvedilol (COREG) 12.5 MG tablet Take 1 tablet by mouth 2 (Two) Times a Day With Meals.      docusate sodium (Colace) 100 MG capsule Take 1 capsule by mouth 2 (Two) Times a Day As Needed for Constipation. 60 capsule 0    fesoterodine fumarate (TOVIAZ ER) 8 MG tablet sustained-release 24 hour tablet Take 1 tablet by mouth Daily.      furosemide (LASIX) 40 MG tablet Take 1 tablet by mouth 3 (Three) Times a Week if Needed (FLUID RETENTION).      Insulin Glargine, 1 Unit Dial, (TOUJEO) 300 UNIT/ML solution pen-injector injection Inject 50 Units under the skin into the appropriate area as directed Daily.      losartan (COZAAR) 50 MG tablet Take 1 tablet by mouth Daily.      Pramoxine HCl, Perianal, (Proctofoam) 1 % foam Insert  into the rectum Every 6 (Six) Hours As Needed for Hemorrhoids. 15 g 0    Semaglutide, 1 MG/DOSE, (Ozempic, 1 MG/DOSE,) 4 MG/3ML solution pen-injector Inject 1 mg under the skin into the appropriate area as directed Every 7 (Seven) Days.      vitamin D (ERGOCALCIFEROL) 1.25 MG (71492 UT) capsule capsule Take 1 capsule by mouth 1 (One) Time Per Week.      ferrous sulfate 325 (65 FE) MG tablet Take 1 tablet by mouth 2 (two) times a day. (Patient not  taking: Reported on 5/27/2025) 60 tablet 2    HYDROcodone-acetaminophen (NORCO) 5-325 MG per tablet Take 1 tablet by mouth Every 8 (Eight) Hours As Needed for Severe Pain . (Patient not taking: Reported on 5/27/2025) 8 tablet 0    isosorbide mononitrate (IMDUR) 30 MG 24 hr tablet Take 1 tablet by mouth Daily. (Patient not taking: Reported on 5/27/2025) 30 tablet 0    omeprazole (priLOSEC) 40 MG capsule Take 1 capsule by mouth Daily As Needed (stomach). (Patient not taking: Reported on 12/3/2024)      potassium chloride ER (K-TAB) 20 MEQ tablet controlled-release ER tablet Take 1 tablet by mouth 3 (Three) Times a Week. Prior to [facility] Admission, Patient was on: only takes when she takes Furosemide (Patient not taking: Reported on 12/3/2024)      spironolactone (ALDACTONE) 25 MG tablet Take 1 tablet by mouth Daily. (Patient not taking: Reported on 5/27/2025)      vitamin B-12 (CYANOCOBALAMIN) 1000 MCG tablet Take 1 tablet by mouth Daily. (Patient not taking: Reported on 5/27/2025)       No current facility-administered medications for this visit.     REVIEW OF SYSTEMS  CONSTITUTIONAL:  No fever, chills, night sweats or fatigue.  EYES:  No blurry vision, diplopia or other vision changes.  ENT:  No hearing loss, nosebleeds or sore throat.  CARDIOVASCULAR:  No palpitations, arrhythmia, syncopal episodes or edema.  PULMONARY:  No hemoptysis, wheezing, chronic cough or shortness of breath.  GASTROINTESTINAL:  No nausea or vomiting. No constipation or diarrhea. No abdominal pain.  GENITOURINARY:  No hematuria, kidney stones or frequent urination.  MUSCULOSKELETAL:  No joint or back pains.  INTEGUMENTARY: No rashes or pruritus.  ENDOCRINE:  No excessive thirst or hot flashes.  HEMATOLOGIC:  No history of free bleeding, spontaneous bleeding or clotting.  IMMUNOLOGIC:  No allergies or frequent infections.  NEUROLOGIC: No numbness, tingling, seizures or weakness.  PSYCHIATRIC:  No anxiety or depression.    PHYSICAL  "EXAMINATION  /65   Pulse 77   Temp 97.5 °F (36.4 °C) (Temporal)   Resp 18   Ht 175.3 cm (69.02\")   Wt 92 kg (202 lb 12.8 oz)   SpO2 95%   BMI 29.93 kg/m²     Pain Score:  Pain Score    25 0942   PainSc: 0-No pain     PHQ-Score Total:  PHQ-9 Total Score:      ECO  GENERAL:  A well-developed, well-nourished, elderly, white female in no acute distress, yet again ambulating with the assistance of a cane.  HEENT:  Pupils equally round and reactive to light. Extraocular muscles intact.  CARDIOVASCULAR:  Regular rate and rhythm. No murmurs, gallops or rubs.  LUNGS:  Clear to auscultation bilaterally.  ABDOMEN:  Soft, nontender, nondistended with positive bowel sounds.  EXTREMITIES:  No clubbing or cyanosis bilaterally. Stable, ~1-2+ edema in the bilateral lower extremities.  SKIN:  No rashes or petechiae.  NEURO:  Cranial nerves grossly intact. No focal deficits.  PSYCH:  Alert and oriented x3.    LABORATORY  Lab Results   Component Value Date    WBC 4.35 2025    HGB 12.3 2025    HCT 40.2 2025    MCV 93.7 2025     2025    NEUTROABS 2.85 2025       Lab Results   Component Value Date     (L) 2024    K 4.8 2024     2024    CO2 26.0 2024    BUN 16 2024    CREATININE 0.99 2024    GLUCOSE 238 (H) 2024    CALCIUM 9.9 2024    AST 15 2024    ALT 11 2024    ALKPHOS 142 (H) 2024    BILITOT 0.9 2024    PROTEINTOT 6.6 2024    ALBUMIN 4.1 2024     CBC (2025): WBCs: 4.35; HgB: 12.3; Hct: 40.2; platelets: 150  CBC (2024): WBCs: 5.56; HgB: 12.5; Hct: 40.3; platelets: 170  CBC (2024): WBCs: 5.08; HgB: 12.2; Hct: 39.2; platelets: 139  CBC (2023): WBCs: 6.04; HgB: 13.2; Hct: 41.3; platelets: 176  CBC (2023): WBCs: 6.20; HgB: 12.5; Hct: 39.2; platelets: 174  CBC (2023): WBCs: 5.52; HgB: 12.3; Hct: 38.5; platelets: 150  CBC (2022): WBCs: 7.09; " HgB: 12.3; Hct: 38.5; platelets: 163  CBC (06/01/2022): WBCs: 6.29; HgB: 11.0; Hct: 34.8; platelets: 223  CBC (02/21/2022): WBCs: 5.31; HgB: 10.9; Hct: 34.7; platelets: 142  CBC (10/27/2021): WBCs: 5.41; HgB: 9.9; Hct: 33.3; platelets: 183  CBC (10/06/2021): WBCs: 4.85; HgB: 8.7; Hct: 29.8; platelets: 184  CBC (08/18/2021): WBCs: 6.65; HgB: 7.5; Hct: 24.5; platelets: 173    Iron profile (12/01/2022): serum iron: 75; % saturation: 20; TIBC: 377; ferritin: 150.5 ng/mL  Iron profile (06/01/2022): serum iron: 64; % saturation: 19; TIBC: 341; ferritin: 176.6 ng/mL  Iron profile (02/21/2022): serum iron: 58; % saturation: 15; TIBC: 380; ferritin: 87.74 ng/mL  Iron profile (10/27/2021): serum iron: 44; % saturation: 9; TIBC: 514; ferritin: 57.67 ng/mL    IMAGING  CT abdomen and pelvis without contrast (05/29/2021):  Impression:  1) Adenopathy in the right lower quadrant mesentery concerning for malignancy. There is some adjacent inflammatory fat stranding with one of the nodes. Consider PET/CT for followup.  2) Mild thickening of the rectosigmoid colon could be due to incomplete distention or mild segmental colitis. There is colonic diverticulosis without focal diverticulitis.  3) Grossly normal small bowel and appendix.  4) Cholecystectomy with some mild fat stranding in the gallbladder fossa. This could be due to relatively recent surgery or some mild inflammation. No fluid collection.  5) Additional findings [are nonacute].    NM PET with fusion CT, skull base to mid-thigh (06/14/2021, compared to CT of the abdomen/pelvis dated 05/29/2021):  Impression:  1) Significant interval improvement in the appearance of the large right lower quadrant soft tissue masses. Residual nodules are present but they do not show FDG avidity.  2) Interval development of a large, soft tissue nodule in the left lower quadrant which does show hypermetabolic activity.    CT soft tissue neck with contrast (10/22/2021):  Impression: No cervical  lymphadenopathy or evidence of lymphomatous involvement in the neck.    CT chest with contrast (10/22/2021):  Impression: Stable chest. No findings in the CT of the chest to suggest lymphoma.    CT abdomen and pelvis with contrast (10/22/2021):  Impression: The hypermetabolic soft tissue mass in the left abdomen on the PET fusion CT is not demonstrable on today's CT. There is fatty change in the liver with a small amount of ascites around the liver. There are prominent lymph nodes along the iliac lymph node chains in the pelvis that are in the 1 cm size range.    CT abdomen and pelvis without contrast (05/18/2022, compared to 10/22/2021):  Impression:  1) The heart is enlarged.  2) No enlarged retroperitoneal or mesenteric lymph nodes at this time. No evidence of residual or recurrent lymphoma.  3) Fat-containing umbilical hernia with a diameter of 3.5 cm.    CT chest without contrast (06/03/2024, compared to 10/22/2021):  Impression:  1) Stable exam. No evidence of thoracic adenopathy.  2) Other incidental/nonacute findings stable from previous.    CT abdomen and pelvis without contrast (06/03/2024, compared to 05/18/2022):  Impression:  1) Bulky enlarged nodes in the mesentery and adjacent to the duodenum which are of increased size from prior exams. Index notes detailed above (largest lymph node is 3.31 cm and was previously 1.90 cm. Slightly smaller lymph node is 2.54 cm and was previously 2.37 cm. No bulky inguinal adenopathy identified.    CT chest without contrast (05/23/2025, compared to 06/03/2024):  Impression:  1) No acute thoracic findings identiifed.  2) Mild enlarged precarinal lymph node is 1.8 x 2.6 cm and was previously 1.5 x 2.5 cm. No bulky appearing nodes noted. No axillary adenopathy.  3) Stable pulmonary fibrosis changes with peripheral predominance.  4) 5.9 mm subpleural nodule right lower lobe, image #64, stable from previous.  5) No new nodules have developed. No suspicious nodules are  noted.  6) Cardiomegaly and severe coronary artery calcifications, stable. Left cardiac pacer device again noted.    CT abdomen and pelvis without contrast (05/23/2025, compared to 06/03/2024):  Impression:  1) No new stations of abdominal or pelvic lymphadenopathy are identified.  2) Stable enlarged right mesenteric bulky lymph nodes. Largest node is 3.4 cm and was previously 3.3 cm. Adjacent node is 2.3 cm and was previously 2.5 cm.  3) Fat only containing anterior abdominal wall hernias are noted.  4) Stable left inguinal node that is 2.7 x 0.9 cm and was previously 2.8 x 0.9 cm.  5) Stable size of a right inguinal lymph node that is now 1.8 x 1.4 cm and was previously 1.8 x 0.8 cm.  6) Spleen within normal limits for size.  7) Moderate volume fecal retention in the colon consistent with constipation.  8) Sigmoid diverticulosis without diverticulitis.    PATHOLOGY  Lymph node, small bowel mesenteric #1 (08/12/2021):  Follicular lymphoma, grade 2.    Lymph node, small bowel mesenteric #2 (08/12/2021):  Follicular lymphoma, grade 2.    Small bowel segment, proximal ileum (08/12/2021):  Follicular lymphoma, grade 2. Small bowel also involved by follicular lymphoma. Viable surgical margins.    IMPRESSION AND PLAN  Ms. Justin is an 84 y.o., white female with:  Follicular lymphoma: Diagnosed in midsummer 2021 with, technically, stage IV (a solid organ, the small bowel, was involved), but now still mostly resected disease. I have had multiple, long discussions with the patient (+/- her daughter) since the time of her initial consultation in our clinic (on 10/06/2021) regarding this diagnosis and, in general terms, its prognosis. The postoperative CT scans of the neck, chest, abdomen and pelvis (performed on 10/22/2021 and summarized above) showed no evidence of active lymphoma (a few, prominent, but only ~1 cm in size, lymph nodes in the iliac chains were the only notable findings); and this continued to be the case on  the repeat CT of the abdomen and pelvis performed on 05/18/2022 (and also summarized above). The adenopathy seen on the most recent repeat CT scans (of the chest, abdomen and pelvis without contrast), performed on 05/23/2025 (and summarized above) remains overall very stable compared to the previous set of scans, performed in June 2024. She also remains completely asymptomatic. Given this, continued, expectant monitoring alone remains recommended. Particularly since she is (now) 84 years old, this indolent disease very well may never require any specific treatment again during her lifetime (although, even if it does, a number of options are available to us). We will see her back in clinic in six months (~late November) with a CBC and CMP for a symptom check. We will repeat CT scans of the chest, abdomen and pelvis again in one year. The patient and her daughter were in agreement with these plans.    It is a pleasure to participate in Ms. Justin's care. Please do not hesitate to call with any questions or concerns that you may have.    A total of 30 minutes were spent coordinating this patient’s care in clinic today; more than 50% of this time was face-to-face with the patient and her daughter, reviewing her interim medical history, discussing the results of the recent repeat CT scans and counseling on the current followup plan. All questions were answered to their satisfaction.    FOLLOW UP  Return to our clinic in 6 months (~late November) with a CBC and CMP.            This document was electronically signed by MYAH Vinson MD May 27, 2025 10:07 EDT      CC: MD Shabnam Cline MD

## 2025-06-30 ENCOUNTER — HOSPITAL ENCOUNTER (EMERGENCY)
Facility: HOSPITAL | Age: 85
Discharge: HOME | End: 2025-06-30
Attending: STUDENT IN AN ORGANIZED HEALTH CARE EDUCATION/TRAINING PROGRAM | Admitting: STUDENT IN AN ORGANIZED HEALTH CARE EDUCATION/TRAINING PROGRAM
Payer: MEDICARE

## 2025-06-30 ENCOUNTER — APPOINTMENT (OUTPATIENT)
Dept: CT IMAGING | Facility: HOSPITAL | Age: 85
End: 2025-06-30
Payer: MEDICARE

## 2025-06-30 VITALS
HEART RATE: 70 BPM | DIASTOLIC BLOOD PRESSURE: 84 MMHG | OXYGEN SATURATION: 98 % | HEIGHT: 69 IN | TEMPERATURE: 97.8 F | BODY MASS INDEX: 27.99 KG/M2 | SYSTOLIC BLOOD PRESSURE: 165 MMHG | RESPIRATION RATE: 18 BRPM | WEIGHT: 189 LBS

## 2025-06-30 DIAGNOSIS — S22.31XA CLOSED FRACTURE OF ONE RIB OF RIGHT SIDE, INITIAL ENCOUNTER: Primary | ICD-10-CM

## 2025-06-30 LAB
ALBUMIN SERPL-MCNC: 4 G/DL (ref 3.5–5.2)
ALBUMIN/GLOB SERPL: 1.7 G/DL
ALP SERPL-CCNC: 135 U/L (ref 39–117)
ALT SERPL W P-5'-P-CCNC: 12 U/L (ref 1–33)
ANION GAP SERPL CALCULATED.3IONS-SCNC: 11.5 MMOL/L (ref 5–15)
AST SERPL-CCNC: 20 U/L (ref 1–32)
BASOPHILS # BLD AUTO: 0.04 10*3/MM3 (ref 0–0.2)
BASOPHILS NFR BLD AUTO: 0.7 % (ref 0–1.5)
BILIRUB SERPL-MCNC: 0.8 MG/DL (ref 0–1.2)
BUN SERPL-MCNC: 23.7 MG/DL (ref 8–23)
BUN/CREAT SERPL: 22.6 (ref 7–25)
CALCIUM SPEC-SCNC: 9.2 MG/DL (ref 8.6–10.5)
CHLORIDE SERPL-SCNC: 107 MMOL/L (ref 98–107)
CO2 SERPL-SCNC: 20.5 MMOL/L (ref 22–29)
CREAT SERPL-MCNC: 1.05 MG/DL (ref 0.57–1)
DEPRECATED RDW RBC AUTO: 46 FL (ref 37–54)
EGFRCR SERPLBLD CKD-EPI 2021: 51.9 ML/MIN/1.73
EOSINOPHIL # BLD AUTO: 0.16 10*3/MM3 (ref 0–0.4)
EOSINOPHIL NFR BLD AUTO: 2.7 % (ref 0.3–6.2)
ERYTHROCYTE [DISTWIDTH] IN BLOOD BY AUTOMATED COUNT: 13.4 % (ref 12.3–15.4)
GLOBULIN UR ELPH-MCNC: 2.3 GM/DL
GLUCOSE SERPL-MCNC: 178 MG/DL (ref 65–99)
HCT VFR BLD AUTO: 40.6 % (ref 34–46.6)
HGB BLD-MCNC: 12.5 G/DL (ref 12–15.9)
IMM GRANULOCYTES # BLD AUTO: 0.02 10*3/MM3 (ref 0–0.05)
IMM GRANULOCYTES NFR BLD AUTO: 0.3 % (ref 0–0.5)
LYMPHOCYTES # BLD AUTO: 0.89 10*3/MM3 (ref 0.7–3.1)
LYMPHOCYTES NFR BLD AUTO: 15.2 % (ref 19.6–45.3)
MCH RBC QN AUTO: 28.7 PG (ref 26.6–33)
MCHC RBC AUTO-ENTMCNC: 30.8 G/DL (ref 31.5–35.7)
MCV RBC AUTO: 93.1 FL (ref 79–97)
MONOCYTES # BLD AUTO: 0.62 10*3/MM3 (ref 0.1–0.9)
MONOCYTES NFR BLD AUTO: 10.6 % (ref 5–12)
NEUTROPHILS NFR BLD AUTO: 4.13 10*3/MM3 (ref 1.7–7)
NEUTROPHILS NFR BLD AUTO: 70.5 % (ref 42.7–76)
NRBC BLD AUTO-RTO: 0 /100 WBC (ref 0–0.2)
PLATELET # BLD AUTO: 137 10*3/MM3 (ref 140–450)
PMV BLD AUTO: 10.5 FL (ref 6–12)
POTASSIUM SERPL-SCNC: 4.2 MMOL/L (ref 3.5–5.2)
PROT SERPL-MCNC: 6.3 G/DL (ref 6–8.5)
RBC # BLD AUTO: 4.36 10*6/MM3 (ref 3.77–5.28)
SODIUM SERPL-SCNC: 139 MMOL/L (ref 136–145)
TROPONIN T SERPL HS-MCNC: 26 NG/L
WBC NRBC COR # BLD AUTO: 5.86 10*3/MM3 (ref 3.4–10.8)

## 2025-06-30 PROCEDURE — 85025 COMPLETE CBC W/AUTO DIFF WBC: CPT | Performed by: NURSE PRACTITIONER

## 2025-06-30 PROCEDURE — 99284 EMERGENCY DEPT VISIT MOD MDM: CPT

## 2025-06-30 PROCEDURE — 71250 CT THORAX DX C-: CPT

## 2025-06-30 PROCEDURE — 84484 ASSAY OF TROPONIN QUANT: CPT | Performed by: NURSE PRACTITIONER

## 2025-06-30 PROCEDURE — 36415 COLL VENOUS BLD VENIPUNCTURE: CPT

## 2025-06-30 PROCEDURE — 80053 COMPREHEN METABOLIC PANEL: CPT | Performed by: NURSE PRACTITIONER

## 2025-06-30 RX ORDER — HYDROCODONE BITARTRATE AND ACETAMINOPHEN 5; 325 MG/1; MG/1
1 TABLET ORAL EVERY 6 HOURS PRN
Qty: 12 TABLET | Refills: 0 | Status: SHIPPED | OUTPATIENT
Start: 2025-06-30

## 2025-06-30 NOTE — ED PROVIDER NOTES
Subjective   History of Present Illness  Patient is a 89-year-old female presents today for follow-up.  Patient reports yesterday she was out cutting  beans and states that she tripped over a wagon.  Patient reports that her anterior chest hit the side of the wagon.  Patient reports last night she had difficulty sleeping due to the pain reports soreness.  Patient denies shortness of breath but does report inspiratory pain.    Fall  Associated symptoms: chest pain        Review of Systems   Constitutional: Negative.    HENT: Negative.     Eyes: Negative.    Respiratory: Negative.     Cardiovascular:  Positive for chest pain.   Gastrointestinal: Negative.    Endocrine: Negative.    Genitourinary: Negative.    Musculoskeletal: Negative.    Skin: Negative.    Allergic/Immunologic: Negative.    Neurological: Negative.    Hematological: Negative.    Psychiatric/Behavioral: Negative.         Past Medical History:   Diagnosis Date    Ambulates with cane     Arthritis     Atrial fibrillation     COPD (chronic obstructive pulmonary disease)     COVID-19 virus infection 7/24/2020    Diabetes mellitus     Gallstones     GERD (gastroesophageal reflux disease)     Heart disease     Hyperlipidemia     Hypertension     Mesenteric lymphadenopathy 6/23/2021    Added automatically from request for surgery [id number]    Pulmonary hypertension     Status post laparoscopic cholecystectomy 3/9/2021       Allergies   Allergen Reactions    Nitrofurantoin GI Intolerance     Other reaction(s): GI Intolerance       Past Surgical History:   Procedure Laterality Date    CATARACT EXTRACTION Bilateral     CHOLECYSTECTOMY N/A 3/26/2021    Procedure: CHOLECYSTECTOMY LAPAROSCOPIC (REQUESTS MARIVEL NEWMAN);  Surgeon: Keagan Lopez MD;  Location: [facility] OR;  Service: General;  Laterality: N/A;    COLONOSCOPY N/A 7/14/2021    Procedure: COLONOSCOPY;  Surgeon: Troy Elkins MD;  Location: [facility] OR;  Service: Gastroenterology;  Laterality: N/A;     DIAGNOSTIC LAPAROSCOPY N/A 2021    Procedure: DIAGNOSTIC LAPAROSCOPY CONVERTED TO  OPEN @ 0918 WITH SMALL BOWEL RESECTION;  Surgeon: Troy Elkins MD;  Location:  [facility] OR;  Service: General;  Laterality: N/A;    ENDOSCOPY N/A 2021    Procedure: ESOPHAGOGASTRODUODENOSCOPY WITH ANESTHESIA;  Surgeon: Troy Elkins MD;  Location:  [facility] OR;  Service: Gastroenterology;  Laterality: N/A;    FRACTURE SURGERY      right foot orif    PACEMAKER IMPLANTATION Left 2018       Family History   Problem Relation Age of Onset    Breast cancer Sister     Breast cancer Sister     Breast cancer Sister         40's    Cancer Mother        Social History     Socioeconomic History    Marital status:    Tobacco Use    Smoking status: Former     Current packs/day: 0.00     Average packs/day: 0.5 packs/day for 10.0 years (5.0 ttl pk-yrs)     Types: Cigarettes     Start date: 3/1/1953     Quit date: 3/1/1963     Years since quittin.3    Smokeless tobacco: Never   Vaping Use    Vaping status: Never Used   Substance and Sexual Activity    Alcohol use: No    Drug use: Never    Sexual activity: Defer           Objective   Physical Exam  Vitals and nursing note reviewed.   Constitutional:       Appearance: She is well-developed.   HENT:      Head: Normocephalic.      Right Ear: External ear normal.      Left Ear: External ear normal.   Eyes:      Conjunctiva/sclera: Conjunctivae normal.      Pupils: Pupils are equal, round, and reactive to light.   Cardiovascular:      Rate and Rhythm: Normal rate and regular rhythm.      Heart sounds: Normal heart sounds.   Pulmonary:      Effort: Pulmonary effort is normal.      Breath sounds: Normal breath sounds.   Chest:      Chest wall: Tenderness present.   Abdominal:      General: Bowel sounds are normal.      Palpations: Abdomen is soft.   Musculoskeletal:         General: Normal range of motion.      Cervical back: Normal range of motion and neck supple.    Skin:     General: Skin is warm and dry.      Capillary Refill: Capillary refill takes less than 2 seconds.   Neurological:      Mental Status: She is alert and oriented to person, place, and time.   Psychiatric:         Behavior: Behavior normal.         Thought Content: Thought content normal.         Procedures       Results for orders placed or performed during the hospital encounter of 06/30/25   Comprehensive Metabolic Panel    Collection Time: 06/30/25  9:44 AM    Specimen: Arm, Right; Blood   Result Value Ref Range    Glucose 178 (H) 65 - 99 mg/dL    BUN 23.7 (H) 8.0 - 23.0 mg/dL    Creatinine 1.05 (H) 0.57 - 1.00 mg/dL    Sodium 139 136 - 145 mmol/L    Potassium 4.2 3.5 - 5.2 mmol/L    Chloride 107 98 - 107 mmol/L    CO2 20.5 (L) 22.0 - 29.0 mmol/L    Calcium 9.2 8.6 - 10.5 mg/dL    Total Protein 6.3 6.0 - 8.5 g/dL    Albumin 4.0 3.5 - 5.2 g/dL    ALT (SGPT) 12 1 - 33 U/L    AST (SGOT) 20 1 - 32 U/L    Alkaline Phosphatase 135 (H) 39 - 117 U/L    Total Bilirubin 0.8 0.0 - 1.2 mg/dL    Globulin 2.3 gm/dL    A/G Ratio 1.7 g/dL    BUN/Creatinine Ratio 22.6 7.0 - 25.0    Anion Gap 11.5 5.0 - 15.0 mmol/L    eGFR 51.9 (L) >60.0 mL/min/1.73   High Sensitivity Troponin T    Collection Time: 06/30/25  9:44 AM    Specimen: Arm, Right; Blood   Result Value Ref Range    HS Troponin T 26 (H) <14 ng/L   CBC Auto Differential    Collection Time: 06/30/25  9:44 AM    Specimen: Arm, Right; Blood   Result Value Ref Range    WBC 5.86 3.40 - 10.80 10*3/mm3    RBC 4.36 3.77 - 5.28 10*6/mm3    Hemoglobin 12.5 12.0 - 15.9 g/dL    Hematocrit 40.6 34.0 - 46.6 %    MCV 93.1 79.0 - 97.0 fL    MCH 28.7 26.6 - 33.0 pg    MCHC 30.8 (L) 31.5 - 35.7 g/dL    RDW 13.4 12.3 - 15.4 %    RDW-SD 46.0 37.0 - 54.0 fl    MPV 10.5 6.0 - 12.0 fL    Platelets 137 (L) 140 - 450 10*3/mm3    Neutrophil % 70.5 42.7 - 76.0 %    Lymphocyte % 15.2 (L) 19.6 - 45.3 %    Monocyte % 10.6 5.0 - 12.0 %    Eosinophil % 2.7 0.3 - 6.2 %    Basophil % 0.7 0.0 - 1.5  %    Immature Grans % 0.3 0.0 - 0.5 %    Neutrophils, Absolute 4.13 1.70 - 7.00 10*3/mm3    Lymphocytes, Absolute 0.89 0.70 - 3.10 10*3/mm3    Monocytes, Absolute 0.62 0.10 - 0.90 10*3/mm3    Eosinophils, Absolute 0.16 0.00 - 0.40 10*3/mm3    Basophils, Absolute 0.04 0.00 - 0.20 10*3/mm3    Immature Grans, Absolute 0.02 0.00 - 0.05 10*3/mm3    nRBC 0.0 0.0 - 0.2 /100 WBC     CT Chest Without Contrast Diagnostic   Final Result   1.  Cardiomegaly.   2.  Moderate colonic stool.   3.  Possible right nondisplaced rib deformity of sixth right anterior   rib correlate for pain in this region.       This report was finalized on 6/30/2025 9:46 AM by Dr. Trey Salgado MD.                ED Course  ED Course as of 06/30/25 1720   Mon Jun 30, 2025   1031 Troponin slightly elevated but below patient baseline.Patient to be treated for rib fracture.  [RICHARD]      ED Course User Index  [RICHARD] Leno Burrell, ANNIE                                                       Medical Decision Making  Patient is a 89-year-old female presents today for follow-up.  Patient reports yesterday she was out cutting  beans and states that she tripped over a wagon.  Patient reports that her anterior chest hit the side of the wagon.  Patient reports last night she had difficulty sleeping due to the pain reports soreness.  Patient denies shortness of breath but does report inspiratory pain.    Cardiac workup was benign and imaging revealed solitary rib fracture. Patient was prescribed analgesia and advised to follow up and return for worsening    Problems Addressed:  Closed fracture of one rib of right side, initial encounter: complicated acute illness or injury    Amount and/or Complexity of Data Reviewed  Labs: ordered. Decision-making details documented in ED Course.  Radiology: ordered. Decision-making details documented in ED Course.    Risk  Prescription drug management.        Final diagnoses:   Closed fracture of one rib of right side, initial  encounter       ED Disposition  ED Disposition       ED Disposition   Discharge    Condition   Stable    Comment   --               Shabnam De La Rosa MD  [address] [place] [address] [phone]    Schedule an appointment as soon as possible for a visit   For further evaluation         Medication List        New Prescriptions      naloxone 4 MG/0.1ML nasal spray  Commonly known as: NARCAN  Call 911. Don't prime. [place] in 1 nostril for overdose. Repeat in 2-3 minutes in other nostril if no or minimal breathing/responsiveness.            Changed      * HYDROcodone-acetaminophen 5-325 MG per tablet  Commonly known as: NORCO  Take 1 tablet by mouth Every 8 (Eight) Hours As Needed for Severe Pain .  What changed: Another medication with the same name was added. Make sure you understand how and when to take each.     * HYDROcodone-acetaminophen 5-325 MG per tablet  Commonly known as: NORCO  Take 1 tablet by mouth Every 6 (Six) Hours As Needed for Severe Pain.  What changed: You were already taking a medication with the same name, and this prescription was added. Make sure you understand how and when to take each.           * This list has 2 medication(s) that are the same as other medications prescribed for you. Read the directions carefully, and ask your doctor or other care provider to review them with you.                   Where to Get Your Medications        These medications were sent to [id number] - [address] AT [facility] [address] - [phone] PH - [phone] [address] [address]      Phone: [phone]   HYDROcodone-acetaminophen 5-325 MG per tablet  naloxone 4 MG/0.1ML nasal spray            Leno Burrell, APRN  06/30/25 [id number]

## 2025-07-15 ENCOUNTER — TRANSCRIBE ORDERS (OUTPATIENT)
Dept: ADMINISTRATIVE | Facility: HOSPITAL | Age: 85
End: 2025-07-15
Payer: MEDICARE

## 2025-07-15 DIAGNOSIS — M81.0 AGE-RELATED OSTEOPOROSIS WITHOUT CURRENT PATHOLOGICAL FRACTURE: Primary | ICD-10-CM

## (undated) DEVICE — GLV SURG PREMIERPRO MIC LTX PF SZ7.5 BRN

## (undated) DEVICE — SINGLE PORT MANIFOLD: Brand: NEPTUNE 2

## (undated) DEVICE — [HIGH FLOW INSUFFLATOR,  DO NOT USE IF PACKAGE IS DAMAGED,  KEEP DRY,  KEEP AWAY FROM SUNLIGHT,  PROTECT FROM HEAT AND RADIOACTIVE SOURCES.]: Brand: PNEUMOSURE

## (undated) DEVICE — SUT VIC 0/0 UR6 27IN DYED J603H

## (undated) DEVICE — COR LAP CHOLE: Brand: MEDLINE INDUSTRIES, INC.

## (undated) DEVICE — SYR LUERLOK 30CC

## (undated) DEVICE — ENDOGATOR TUBING FOR ENDOGATOR EGP-100 IRRIGATION PUMP,OLYMPUS OFP PUMP, OLYMPUS AFU-100 PUMP AND ERBE EIP2 PUMP: Brand: ENDOGATOR

## (undated) DEVICE — SKIN AFFIX SURG ADHESIVE 72/CS 0.55ML: Brand: MEDLINE

## (undated) DEVICE — HOLDER: Brand: DEROYAL

## (undated) DEVICE — ENDOCUT SCISSOR TIP, DISPOSABLE: Brand: RENEW

## (undated) DEVICE — PATIENT RETURN ELECTRODE, SINGLE-USE, CONTACT QUALITY MONITORING, ADULT, WITH 9FT CORD, FOR PATIENTS WEIGING OVER 33LBS. (15KG): Brand: MEGADYNE

## (undated) DEVICE — APPL CHLORAPREP HI/LITE 26ML ORNG

## (undated) DEVICE — DRAPE,UTILTY,TAPE,15X26, 4EA/PK: Brand: MEDLINE

## (undated) DEVICE — ENDOPATH ELECTROSURGERY PROBE PLUS II PISTOL HAND CONTROL PISTOL GRIP HANDLES WITH SUCTION AND IRRRIGATION FOR HAND CONTROL MONOPOLAR ELCTROSURGERY USE ONLY WITH PROBE PLUS II SHAFTS: Brand: ENDOPATH

## (undated) DEVICE — SUT VIC 2/0 UR6 27IN J602H

## (undated) DEVICE — ENDOPATH XCEL BLADELESS TROCARS WITH STABILITY SLEEVES: Brand: ENDOPATH XCEL

## (undated) DEVICE — ENDOPATH ELECTROSURGERY PROBE PLUS II 5MM RIGHT ANGLE MONOPOLAR ELECTROSURGERY SUCTION AND IRRRIGATION SHAFTS WITH RIGHT ANGLE ELECTRODE - USE ONLY WITH PROBE PLUS II HANDLES: Brand: ENDOPATH

## (undated) DEVICE — THE BITE BLOCK MAXI, LATEX FREE STRAP IS USED TO PROTECT THE ENDOSCOPE INSERTION TUBE FROM BEING BITTEN BY THE PATIENT.

## (undated) DEVICE — Device

## (undated) DEVICE — CYSTO/BLADDER IRRIGATION SET, REGULATING CLAMP

## (undated) DEVICE — FRCP BX RADJAW4 NDL 2.8 240CM LG OG BX40

## (undated) DEVICE — GLV SURG PREMIERPRO MIC LTX PF SZ8 BRN

## (undated) DEVICE — TROCAR: Brand: KII FIOS FIRST ENTRY

## (undated) DEVICE — NDL HYPO ECLPS SFTY 18G 1 1/2IN

## (undated) DEVICE — ENDOPATH XCEL UNIVERSAL TROCAR STABLILITY SLEEVES: Brand: ENDOPATH XCEL

## (undated) DEVICE — SUT MNCRYL 4/0 PS2 18 IN

## (undated) DEVICE — ENDOGATOR AUXILIARY WATER JET CONNECTOR: Brand: ENDOGATOR

## (undated) DEVICE — PK LAP GEN 70

## (undated) DEVICE — TROCAR: Brand: KII® SLEEVE

## (undated) DEVICE — INSUFFLATION NEEDLE TO ESTABLISH PNEUMOPERITONEUM.: Brand: INSUFFLATION NEEDLE

## (undated) DEVICE — CONN Y IRR DISP 1P/U

## (undated) DEVICE — ANTIBACTERIAL UNDYED BRAIDED (POLYGLACTIN 910), SYNTHETIC ABSORBABLE SUTURE: Brand: COATED VICRYL